# Patient Record
Sex: FEMALE | Race: WHITE | NOT HISPANIC OR LATINO | Employment: OTHER | URBAN - METROPOLITAN AREA
[De-identification: names, ages, dates, MRNs, and addresses within clinical notes are randomized per-mention and may not be internally consistent; named-entity substitution may affect disease eponyms.]

---

## 2019-07-16 ENCOUNTER — HISTORICAL (OUTPATIENT)
Dept: SURGERY | Facility: HOSPITAL | Age: 79
End: 2019-07-16

## 2019-07-16 LAB
BUN SERPL-MCNC: 22 MG/DL (ref 7–18)
CALCIUM SERPL-MCNC: 9.4 MG/DL (ref 8.5–10.1)
CHLORIDE SERPL-SCNC: 105 MMOL/L (ref 98–107)
CO2 SERPL-SCNC: 27 MMOL/L (ref 21–32)
CREAT SERPL-MCNC: 1.45 MG/DL (ref 0.55–1.02)
CREAT/UREA NIT SERPL: 15.2
GLUCOSE SERPL-MCNC: 80 MG/DL (ref 74–106)
INR PPP: 1 (ref 0–1.3)
POTASSIUM SERPL-SCNC: 4.9 MMOL/L (ref 3.5–5.1)
PROTHROMBIN TIME: 13.2 SECOND(S) (ref 12–14)
SODIUM SERPL-SCNC: 137 MMOL/L (ref 136–145)

## 2019-07-23 LAB
ABS NEUT (OLG): 7.3 X10(3)/MCL (ref 2.1–9.2)
BASOPHILS # BLD AUTO: 0.1 X10(3)/MCL (ref 0–0.2)
BASOPHILS NFR BLD AUTO: 1 %
EOSINOPHIL # BLD AUTO: 0.2 X10(3)/MCL (ref 0–0.9)
EOSINOPHIL NFR BLD AUTO: 2 %
ERYTHROCYTE [DISTWIDTH] IN BLOOD BY AUTOMATED COUNT: 13.8 % (ref 11.5–17)
HCT VFR BLD AUTO: 47 % (ref 37–47)
HGB BLD-MCNC: 14.9 GM/DL (ref 12–16)
IMM GRANULOCYTES # BLD AUTO: 0 10*3/UL
IMM GRANULOCYTES NFR BLD AUTO: 2 %
LYMPHOCYTES # BLD AUTO: 1.7 X10(3)/MCL (ref 0.6–4.6)
LYMPHOCYTES NFR BLD AUTO: 17 %
MCH RBC QN AUTO: 29.7 PG (ref 27–31)
MCHC RBC AUTO-ENTMCNC: 31.7 GM/DL (ref 33–36)
MCV RBC AUTO: 93.6 FL (ref 80–94)
MONOCYTES # BLD AUTO: 1 X10(3)/MCL (ref 0.1–1.3)
MONOCYTES NFR BLD AUTO: 10 %
NEUTROPHILS # BLD AUTO: 7.3 X10(3)/MCL (ref 2.1–9.2)
NEUTROPHILS NFR BLD AUTO: 70 %
NRBC BLD AUTO-RTO: 0 % (ref 0–0.2)
PLATELET # BLD AUTO: 301 X10(3)/MCL (ref 130–400)
PMV BLD AUTO: 10.4 FL (ref 9.4–12.4)
RBC # BLD AUTO: 5.02 X10(6)/MCL (ref 4.2–5.4)
WBC # SPEC AUTO: 10.5 X10(3)/MCL (ref 4.5–11.5)

## 2019-07-30 ENCOUNTER — HISTORICAL (OUTPATIENT)
Dept: SURGERY | Facility: HOSPITAL | Age: 79
End: 2019-07-30

## 2019-08-03 ENCOUNTER — HISTORICAL (OUTPATIENT)
Dept: ADMINISTRATIVE | Facility: HOSPITAL | Age: 79
End: 2019-08-03

## 2019-09-09 ENCOUNTER — HISTORICAL (OUTPATIENT)
Dept: CARDIOLOGY | Facility: HOSPITAL | Age: 79
End: 2019-09-09

## 2019-10-17 ENCOUNTER — HISTORICAL (OUTPATIENT)
Dept: RADIOLOGY | Facility: HOSPITAL | Age: 79
End: 2019-10-17

## 2019-11-27 ENCOUNTER — HISTORICAL (OUTPATIENT)
Dept: ADMINISTRATIVE | Facility: HOSPITAL | Age: 79
End: 2019-11-27

## 2020-01-09 ENCOUNTER — HISTORICAL (OUTPATIENT)
Dept: ADMINISTRATIVE | Facility: HOSPITAL | Age: 80
End: 2020-01-09

## 2020-01-09 LAB
AMPHET UR QL SCN: NEGATIVE
BARBITURATE SCN PRESENT UR: NEGATIVE
BENZODIAZ UR QL SCN: NEGATIVE
CANNABINOIDS UR QL SCN: NEGATIVE
COCAINE UR QL SCN: NEGATIVE
OPIATES UR QL SCN: NEGATIVE
PCP UR QL: NEGATIVE
PH UR STRIP.AUTO: 5.5 [PH] (ref 5–7.5)
SP GR FLD REFRACTOMETRY: 1.02 (ref 1–1.03)

## 2020-05-01 LAB
ABS NEUT (OLG): 3.42 X10(3)/MCL (ref 2.1–9.2)
BASOPHILS # BLD AUTO: 0 X10(3)/MCL (ref 0–0.2)
BASOPHILS NFR BLD AUTO: 1 %
BUN SERPL-MCNC: 16 MG/DL (ref 9.8–20.1)
CALCIUM SERPL-MCNC: 9 MG/DL (ref 8.4–10.2)
CHLORIDE SERPL-SCNC: 104 MMOL/L (ref 98–107)
CO2 SERPL-SCNC: 25 MMOL/L (ref 23–31)
CREAT SERPL-MCNC: 1.04 MG/DL (ref 0.55–1.02)
CREAT/UREA NIT SERPL: 15
EOSINOPHIL # BLD AUTO: 0.2 X10(3)/MCL (ref 0–0.9)
EOSINOPHIL NFR BLD AUTO: 4 %
ERYTHROCYTE [DISTWIDTH] IN BLOOD BY AUTOMATED COUNT: 13.7 % (ref 11.5–17)
GLUCOSE SERPL-MCNC: 98 MG/DL (ref 82–115)
HCT VFR BLD AUTO: 43.1 % (ref 37–47)
HGB BLD-MCNC: 13.6 GM/DL (ref 12–16)
INR PPP: 1 (ref 0–1.3)
LYMPHOCYTES # BLD AUTO: 1.3 X10(3)/MCL (ref 0.6–4.6)
LYMPHOCYTES NFR BLD AUTO: 23 %
MCH RBC QN AUTO: 29 PG (ref 27–31)
MCHC RBC AUTO-ENTMCNC: 31.6 GM/DL (ref 33–36)
MCV RBC AUTO: 91.9 FL (ref 80–94)
MONOCYTES # BLD AUTO: 0.6 X10(3)/MCL (ref 0.1–1.3)
MONOCYTES NFR BLD AUTO: 11 %
NEUTROPHILS # BLD AUTO: 3.42 X10(3)/MCL (ref 2.1–9.2)
NEUTROPHILS NFR BLD AUTO: 61 %
PLATELET # BLD AUTO: 208 X10(3)/MCL (ref 130–400)
PMV BLD AUTO: 10 FL (ref 9.4–12.4)
POTASSIUM SERPL-SCNC: 4.7 MMOL/L (ref 3.5–5.1)
PROTHROMBIN TIME: 12.4 SECOND(S) (ref 11.1–13.7)
RBC # BLD AUTO: 4.69 X10(6)/MCL (ref 4.2–5.4)
SODIUM SERPL-SCNC: 141 MMOL/L (ref 136–145)
WBC # SPEC AUTO: 5.6 X10(3)/MCL (ref 4.5–11.5)

## 2020-05-06 ENCOUNTER — HISTORICAL (OUTPATIENT)
Dept: SURGERY | Facility: HOSPITAL | Age: 80
End: 2020-05-06

## 2020-05-27 ENCOUNTER — HISTORICAL (OUTPATIENT)
Dept: SURGERY | Facility: HOSPITAL | Age: 80
End: 2020-05-27

## 2020-05-27 LAB — SARS-COV-2 RNA RESP QL NAA+PROBE: NOT DETECTED

## 2020-12-02 ENCOUNTER — HISTORICAL (OUTPATIENT)
Dept: ADMINISTRATIVE | Facility: HOSPITAL | Age: 80
End: 2020-12-02

## 2021-01-12 ENCOUNTER — HISTORICAL (OUTPATIENT)
Dept: ADMINISTRATIVE | Facility: HOSPITAL | Age: 81
End: 2021-01-12

## 2021-01-12 LAB
ABS NEUT (OLG): 3.35 X10(3)/MCL (ref 2.1–9.2)
ALBUMIN SERPL-MCNC: 3.7 GM/DL (ref 3.4–4.8)
ALBUMIN/GLOB SERPL: 1.2 RATIO (ref 1.1–2)
ALP SERPL-CCNC: 80 UNIT/L (ref 40–150)
ALT SERPL-CCNC: 10 UNIT/L (ref 0–55)
AST SERPL-CCNC: 15 UNIT/L (ref 5–34)
BASOPHILS # BLD AUTO: 0.1 X10(3)/MCL (ref 0–0.2)
BASOPHILS NFR BLD AUTO: 1 %
BILIRUB SERPL-MCNC: 0.6 MG/DL
BILIRUBIN DIRECT+TOT PNL SERPL-MCNC: 0.2 MG/DL (ref 0–0.5)
BILIRUBIN DIRECT+TOT PNL SERPL-MCNC: 0.4 MG/DL (ref 0–0.8)
BUN SERPL-MCNC: 19.9 MG/DL (ref 9.8–20.1)
CALCIUM SERPL-MCNC: 8.8 MG/DL (ref 8.4–10.2)
CHLORIDE SERPL-SCNC: 106 MMOL/L (ref 98–107)
CHOLEST SERPL-MCNC: 231 MG/DL
CHOLEST/HDLC SERPL: 4 {RATIO} (ref 0–5)
CO2 SERPL-SCNC: 25 MMOL/L (ref 23–31)
CREAT SERPL-MCNC: 1.04 MG/DL (ref 0.55–1.02)
CRP SERPL HS-MCNC: 0.46 MG/DL
EOSINOPHIL # BLD AUTO: 0.3 X10(3)/MCL (ref 0–0.9)
EOSINOPHIL NFR BLD AUTO: 5 %
ERYTHROCYTE [DISTWIDTH] IN BLOOD BY AUTOMATED COUNT: 13.5 % (ref 11.5–17)
ERYTHROCYTE [SEDIMENTATION RATE] IN BLOOD: 17 MM/HR (ref 0–20)
GLOBULIN SER-MCNC: 3.1 GM/DL (ref 2.4–3.5)
GLUCOSE SERPL-MCNC: 138 MG/DL (ref 82–115)
HCT VFR BLD AUTO: 43.5 % (ref 37–47)
HDLC SERPL-MCNC: 60 MG/DL (ref 35–60)
HGB BLD-MCNC: 13.6 GM/DL (ref 12–16)
LDLC SERPL CALC-MCNC: 151 MG/DL (ref 50–140)
LYMPHOCYTES # BLD AUTO: 1.3 X10(3)/MCL (ref 0.6–4.6)
LYMPHOCYTES NFR BLD AUTO: 23 %
MCH RBC QN AUTO: 29.5 PG (ref 27–31)
MCHC RBC AUTO-ENTMCNC: 31.3 GM/DL (ref 33–36)
MCV RBC AUTO: 94.4 FL (ref 80–94)
MONOCYTES # BLD AUTO: 0.7 X10(3)/MCL (ref 0.1–1.3)
MONOCYTES NFR BLD AUTO: 13 %
NEUTROPHILS # BLD AUTO: 3.35 X10(3)/MCL (ref 2.1–9.2)
NEUTROPHILS NFR BLD AUTO: 58 %
PLATELET # BLD AUTO: 246 X10(3)/MCL (ref 130–400)
PMV BLD AUTO: 10.9 FL (ref 9.4–12.4)
POTASSIUM SERPL-SCNC: 4.3 MMOL/L (ref 3.5–5.1)
PROT SERPL-MCNC: 6.8 GM/DL (ref 5.8–7.6)
RBC # BLD AUTO: 4.61 X10(6)/MCL (ref 4.2–5.4)
RHEUMATOID FACT SERPL-ACNC: <13 IU/ML
SODIUM SERPL-SCNC: 143 MMOL/L (ref 136–145)
TRIGL SERPL-MCNC: 98 MG/DL (ref 37–140)
TSH SERPL-ACNC: 2.27 UIU/ML (ref 0.35–4.94)
URATE SERPL-MCNC: 8.9 MG/DL (ref 2.6–6)
VLDLC SERPL CALC-MCNC: 20 MG/DL
WBC # SPEC AUTO: 5.8 X10(3)/MCL (ref 4.5–11.5)

## 2021-03-29 ENCOUNTER — HISTORICAL (OUTPATIENT)
Dept: ADMINISTRATIVE | Facility: HOSPITAL | Age: 81
End: 2021-03-29

## 2021-03-29 LAB
EST. AVERAGE GLUCOSE BLD GHB EST-MCNC: 142.7 MG/DL
HBA1C MFR BLD: 6.6 %

## 2021-04-08 ENCOUNTER — HISTORICAL (OUTPATIENT)
Dept: RADIOLOGY | Facility: HOSPITAL | Age: 81
End: 2021-04-08

## 2021-08-03 ENCOUNTER — HISTORICAL (OUTPATIENT)
Dept: ADMINISTRATIVE | Facility: HOSPITAL | Age: 81
End: 2021-08-03

## 2021-08-03 LAB
ABS NEUT (OLG): 4.34 X10(3)/MCL (ref 2.1–9.2)
ALBUMIN SERPL-MCNC: 3.7 GM/DL (ref 3.4–4.8)
ALBUMIN/GLOB SERPL: 1.1 RATIO (ref 1.1–2)
ALP SERPL-CCNC: 71 UNIT/L (ref 40–150)
ALT SERPL-CCNC: 8 UNIT/L (ref 0–55)
AST SERPL-CCNC: 14 UNIT/L (ref 5–34)
BASOPHILS # BLD AUTO: 0.1 X10(3)/MCL (ref 0–0.2)
BASOPHILS NFR BLD AUTO: 1 %
BILIRUB SERPL-MCNC: 0.5 MG/DL
BILIRUBIN DIRECT+TOT PNL SERPL-MCNC: 0.2 MG/DL (ref 0–0.5)
BILIRUBIN DIRECT+TOT PNL SERPL-MCNC: 0.3 MG/DL (ref 0–0.8)
BUN SERPL-MCNC: 31.6 MG/DL (ref 9.8–20.1)
CALCIUM SERPL-MCNC: 9.9 MG/DL (ref 8.4–10.2)
CHLORIDE SERPL-SCNC: 107 MMOL/L (ref 98–107)
CHOLEST SERPL-MCNC: 176 MG/DL
CHOLEST/HDLC SERPL: 3 {RATIO} (ref 0–5)
CO2 SERPL-SCNC: 26 MMOL/L (ref 23–31)
CREAT SERPL-MCNC: 1.16 MG/DL (ref 0.55–1.02)
EOSINOPHIL # BLD AUTO: 0.4 X10(3)/MCL (ref 0–0.9)
EOSINOPHIL NFR BLD AUTO: 5 %
ERYTHROCYTE [DISTWIDTH] IN BLOOD BY AUTOMATED COUNT: 13.3 % (ref 11.5–17)
EST. AVERAGE GLUCOSE BLD GHB EST-MCNC: 142.7 MG/DL
GLOBULIN SER-MCNC: 3.4 GM/DL (ref 2.4–3.5)
GLUCOSE SERPL-MCNC: 144 MG/DL (ref 82–115)
HBA1C MFR BLD: 6.6 %
HCT VFR BLD AUTO: 45 % (ref 37–47)
HDLC SERPL-MCNC: 63 MG/DL (ref 35–60)
HGB BLD-MCNC: 13.9 GM/DL (ref 12–16)
LDLC SERPL CALC-MCNC: 93 MG/DL (ref 50–140)
LYMPHOCYTES # BLD AUTO: 1.7 X10(3)/MCL (ref 0.6–4.6)
LYMPHOCYTES NFR BLD AUTO: 23 %
MCH RBC QN AUTO: 29.4 PG (ref 27–31)
MCHC RBC AUTO-ENTMCNC: 30.9 GM/DL (ref 33–36)
MCV RBC AUTO: 95.1 FL (ref 80–94)
MONOCYTES # BLD AUTO: 0.9 X10(3)/MCL (ref 0.1–1.3)
MONOCYTES NFR BLD AUTO: 12 %
NEUTROPHILS # BLD AUTO: 4.34 X10(3)/MCL (ref 2.1–9.2)
NEUTROPHILS NFR BLD AUTO: 59 %
PLATELET # BLD AUTO: 237 X10(3)/MCL (ref 130–400)
PMV BLD AUTO: 10.5 FL (ref 9.4–12.4)
POTASSIUM SERPL-SCNC: 5.1 MMOL/L (ref 3.5–5.1)
PROT SERPL-MCNC: 7.1 GM/DL (ref 5.8–7.6)
RBC # BLD AUTO: 4.73 X10(6)/MCL (ref 4.2–5.4)
SODIUM SERPL-SCNC: 143 MMOL/L (ref 136–145)
TRIGL SERPL-MCNC: 100 MG/DL (ref 37–140)
TSH SERPL-ACNC: 3.95 UIU/ML (ref 0.35–4.94)
URATE SERPL-MCNC: 5.7 MG/DL (ref 2.6–6)
VLDLC SERPL CALC-MCNC: 20 MG/DL
WBC # SPEC AUTO: 7.4 X10(3)/MCL (ref 4.5–11.5)

## 2021-08-06 ENCOUNTER — HISTORICAL (OUTPATIENT)
Dept: RADIOLOGY | Facility: HOSPITAL | Age: 81
End: 2021-08-06

## 2022-01-27 ENCOUNTER — HISTORICAL (OUTPATIENT)
Dept: ADMINISTRATIVE | Facility: HOSPITAL | Age: 82
End: 2022-01-27

## 2022-01-27 LAB
BUN SERPL-MCNC: 19.3 MG/DL (ref 9.8–20.1)
CALCIUM SERPL-MCNC: 9 MG/DL (ref 8.7–10.5)
CHLORIDE SERPL-SCNC: 108 MMOL/L (ref 98–107)
CO2 SERPL-SCNC: 23 MMOL/L (ref 23–31)
CREAT SERPL-MCNC: 1.09 MG/DL (ref 0.55–1.02)
CREAT/UREA NIT SERPL: 18
EST. AVERAGE GLUCOSE BLD GHB EST-MCNC: 151.3 MG/DL
GLUCOSE SERPL-MCNC: 123 MG/DL (ref 82–115)
HBA1C MFR BLD: 6.9 %
HEMOLYSIS INTERF INDEX SERPL-ACNC: 1
ICTERIC INTERF INDEX SERPL-ACNC: 1
LIPEMIC INTERF INDEX SERPL-ACNC: 6
POTASSIUM SERPL-SCNC: 4.6 MMOL/L (ref 3.5–5.1)
PROT UR STRIP-MCNC: 88.8 MG/DL
SODIUM SERPL-SCNC: 141 MMOL/L (ref 136–145)

## 2022-04-30 NOTE — OP NOTE
Patient:   Ana Appiah            MRN: [id number]            FIN: [id number]               Age:   79 years     Sex:  Female     :  1940   Associated Diagnoses:   None   Author:   Sunny Teixeira MD      DATE OF SURGERY:    2020    SURGEON:  Sunny Teixeira MD    PREOPERATIVE DIAGNOSIS:  Chronic pain syndrome.    POSTOPERATIVE DIAGNOSE:  Chronic pain syndrome.    PROCEDURE PERFORMED:  Fluoroscopically-guided placement of 2 spinal cord stimulator leads under anesthesia for programming and trial.    EQUIPMENT USED:  [employer] stimulator kit.    DETAILED DESCRIPTION:  Following informed consent, and with the patient under general endotracheal anesthesia, he was prepped and draped in the usual sterile fashion.  I infiltrated the tissue overlying L2-3 with local anesthetic.  Under fluoroscopic guidance, I entered the epidural space at L1-2 using two 14-gauge Touhy curved-tip spinal needles.  I introduced stimulator lead and advanced it to the top of T8 and then a 2nd stimulator lead to the top of T9.  I carefully removed the stylets and needles while keeping the lead in place.  The leads were then connected to the generator for programming.  Everything was secured with sterile dressing.  The patient tolerated the procedure well without apparent complication.      ______________________________  Sunny Teixeira MD

## 2022-04-30 NOTE — OP NOTE
Patient:   Ana Appiah            MRN: [id number]            FIN: [id number]               Age:   79 years     Sex:  Female     :  1940   Associated Diagnoses:   None   Author:   Sunny Teixeira MD      DATE OF SURGERY:  20      SURGEON:  Sunny Teixeira MD    PREOPERATIVE DIAGNOSIS:  Chronic pain syndrome.    POSTOPERATIVE DIAGNOSIS:  Chronic pain syndrome.    PROCEDURES PERFORMED:    1. Percutaneous implantation of neurostimulator electrode arrays.  2. Insertion of spinal neurostimulator pulse generator.  3. Electronic analysis of implanted neurostimulator pulse generator system with intraoperative and subsequent programming.    COMPLICATIONS:  None.    INDICATIONS FOR PROCEDURE:  This is a  79 year-old female with a history of chronic pain syndrome, who successfully underwent spinal cord stimulator trial and elected to proceed with implantation.    EQUIPMENT USED:  [employer] stimulator kit.    DETAILED DESCRIPTION:  Following informed consent and with the patient under general endotracheal anesthesia, he was prepped and draped in the usual sterile fashion.  The tissue overlying L2-3 was infiltrated with local anesthetic.  Under fluoroscopic guidance, I advanced a 14-gauge Tuohy curved-tip spinal needle entering the epidural space at T12-L1.  I introduced the stimulator lead and advanced it to the top of T8.  An incision was made over the needle.  Hemostasis was achieved.  The stylet and needle were carefully removed while keeping the lead in place.  An anchoring attachment was placed over the lead, and this was secured with 0 silk suture.  A second 14-gauge Touhy curved-tip spinal needle was then introduced and used to enter into the epidural space at T12-L1.  A second stimulator lead was introduced and advanced to the top of T9.  The stylet and needle were carefully removed while keeping the lead in place.  An anchoring attachment was placed over this lead, and this was secured with 0  silk suture.  A left paramedian incision was then made and dissection was carried down approximately 1 cm deep, using a finger sweep technique to create a pocket.  The neurostimulator pulse generator was temporarily placed in the pocket to confirm positioning.  This was removed and cleaned.  The leads were tunneled subcutaneously to the pocket.  These were connected to the neurostimulator pulse generator, and analysis of the generator confirmed proper functioning.  Final fluoroscopic AP and lateral views were obtained of the leads to confirm placement.  The anchoring attachments were then tightened with the supplied screwdriver and leads connected to the generator were tightened using the supplied screwdriver.  The neurostimulator pulse generator was placed in the pocket. Both wounds were copiously irrigated with Bacitracin and Betadine.  Standard layer closure was performed at both sites with one layer of 0 Vicryl followed by one layer of Stratafix. Exofin was applied to the wounds.  The patient tolerated the procedure well.  There were no apparent complications.      ______________________________  Sunny Teixeira MD

## 2022-04-30 NOTE — OP NOTE
Patient:   Ana Appiah            MRN: [id number]            FIN: [id number]               Age:   79 years     Sex:  Female     :  1940   Associated Diagnoses:   None   Author:   Sunny Teixeira MD      DATE OF SURGERY:    2019    SURGEON:  Sunny Teixeira MD    PREOPERATIVE DIAGNOSIS:  Lumbar spondylosis.    POSTOPERATIVE DIAGNOSIS:  Lumbar spondylosis.    PROCEDURE PERFORMED:  Fluoroscopically-guided medial branch blocks at left L3, L4, L5, and S1.    EQUIPMENT USED:  Epidural tray.    DESCRIPTION OF PROCEDURE:  Following informed consent, the patient was prepped and draped in the usual sterile fashion.  I infiltrated the tissue overlying L3, L4, L5, and S1 with local anesthetic.  Under fluoroscopic guidance, I advanced 4 22-gauge 3.5 inch BD spinal needles, performing medial branch blocks using, divided between the 4 needles, a combination of 160 mg Depo-Medrol, 60mg lidocaine, and 1 mL of 5% dextrose in water.  I removed the needles and applied sterile dressings.  The patient tolerated the procedure well without apparent complication.    IMPRESSION:  Successful fluoroscopically-guided medial branch blocks at left L3, L4, L5, and S1.

## 2022-05-23 ENCOUNTER — LAB VISIT (OUTPATIENT)
Dept: LAB | Facility: HOSPITAL | Age: 82
End: 2022-05-23
Payer: MEDICARE

## 2022-05-23 DIAGNOSIS — I15.2 HYPERTENSION COMPLICATING DIABETES: ICD-10-CM

## 2022-05-23 DIAGNOSIS — E78.5 HYPERLIPIDEMIA ASSOCIATED WITH TYPE 2 DIABETES MELLITUS: Primary | ICD-10-CM

## 2022-05-23 DIAGNOSIS — E11.59 HYPERTENSION COMPLICATING DIABETES: ICD-10-CM

## 2022-05-23 DIAGNOSIS — E11.69 HYPERLIPIDEMIA ASSOCIATED WITH TYPE 2 DIABETES MELLITUS: Primary | ICD-10-CM

## 2022-05-23 LAB
ALBUMIN SERPL-MCNC: 4 GM/DL (ref 3.4–4.8)
ALBUMIN/GLOB SERPL: 1.2 RATIO (ref 1.1–2)
ALP SERPL-CCNC: 72 UNIT/L (ref 40–150)
ALT SERPL-CCNC: 12 UNIT/L (ref 0–55)
AST SERPL-CCNC: 16 UNIT/L (ref 5–34)
BASOPHILS # BLD AUTO: 0.07 X10(3)/MCL (ref 0–0.2)
BASOPHILS NFR BLD AUTO: 0.9 %
BILIRUBIN DIRECT+TOT PNL SERPL-MCNC: 1 MG/DL
BUN SERPL-MCNC: 19.5 MG/DL (ref 9.8–20.1)
CALCIUM SERPL-MCNC: 9.4 MG/DL (ref 8.4–10.2)
CHLORIDE SERPL-SCNC: 107 MMOL/L (ref 98–107)
CHOLEST SERPL-MCNC: 259 MG/DL
CHOLEST/HDLC SERPL: 4 {RATIO} (ref 0–5)
CO2 SERPL-SCNC: 24 MMOL/L (ref 23–31)
CREAT SERPL-MCNC: 1.14 MG/DL (ref 0.55–1.02)
EOSINOPHIL # BLD AUTO: 0.32 X10(3)/MCL (ref 0–0.9)
EOSINOPHIL NFR BLD AUTO: 4.1 %
ERYTHROCYTE [DISTWIDTH] IN BLOOD BY AUTOMATED COUNT: 13.4 % (ref 11.5–17)
EST. AVERAGE GLUCOSE BLD GHB EST-MCNC: 142.7 MG/DL
GLOBULIN SER-MCNC: 3.3 GM/DL (ref 2.4–3.5)
GLUCOSE SERPL-MCNC: 138 MG/DL (ref 82–115)
HBA1C MFR BLD: 6.6 %
HCT VFR BLD AUTO: 44.6 % (ref 37–47)
HDLC SERPL-MCNC: 73 MG/DL (ref 35–60)
HGB BLD-MCNC: 14.4 GM/DL (ref 12–16)
IMM GRANULOCYTES # BLD AUTO: 0.12 X10(3)/MCL (ref 0–0.02)
IMM GRANULOCYTES NFR BLD AUTO: 1.5 % (ref 0–0.43)
LDLC SERPL CALC-MCNC: 150 MG/DL (ref 50–140)
LYMPHOCYTES # BLD AUTO: 1.93 X10(3)/MCL (ref 0.6–4.6)
LYMPHOCYTES NFR BLD AUTO: 24.6 %
MCH RBC QN AUTO: 28.9 PG (ref 27–31)
MCHC RBC AUTO-ENTMCNC: 32.3 MG/DL (ref 33–36)
MCV RBC AUTO: 89.4 FL (ref 80–94)
MONOCYTES # BLD AUTO: 0.81 X10(3)/MCL (ref 0.1–1.3)
MONOCYTES NFR BLD AUTO: 10.3 %
NEUTROPHILS # BLD AUTO: 4.6 X10(3)/MCL (ref 2.1–9.2)
NEUTROPHILS NFR BLD AUTO: 58.6 %
NRBC BLD AUTO-RTO: 0 %
PLATELET # BLD AUTO: 271 X10(3)/MCL (ref 130–400)
PMV BLD AUTO: 10.1 FL (ref 9.4–12.4)
POTASSIUM SERPL-SCNC: 4.2 MMOL/L (ref 3.5–5.1)
PROT SERPL-MCNC: 7.3 GM/DL (ref 5.8–7.6)
RBC # BLD AUTO: 4.99 X10(6)/MCL (ref 4.2–5.4)
SODIUM SERPL-SCNC: 144 MMOL/L (ref 136–145)
TRIGL SERPL-MCNC: 179 MG/DL (ref 37–140)
TSH SERPL-ACNC: 5.6 UIU/ML (ref 0.35–4.94)
VLDLC SERPL CALC-MCNC: 36 MG/DL
WBC # SPEC AUTO: 7.9 X10(3)/MCL (ref 4.5–11.5)

## 2022-05-23 PROCEDURE — 83036 HEMOGLOBIN GLYCOSYLATED A1C: CPT

## 2022-05-23 PROCEDURE — 84478 ASSAY OF TRIGLYCERIDES: CPT

## 2022-05-23 PROCEDURE — 84443 ASSAY THYROID STIM HORMONE: CPT

## 2022-05-23 PROCEDURE — 84439 ASSAY OF FREE THYROXINE: CPT

## 2022-05-23 PROCEDURE — 83718 ASSAY OF LIPOPROTEIN: CPT

## 2022-05-23 PROCEDURE — 85025 COMPLETE CBC W/AUTO DIFF WBC: CPT

## 2022-05-23 PROCEDURE — 36415 COLL VENOUS BLD VENIPUNCTURE: CPT

## 2022-05-23 PROCEDURE — 80053 COMPREHEN METABOLIC PANEL: CPT

## 2022-05-23 PROCEDURE — 84481 FREE ASSAY (FT-3): CPT

## 2022-05-24 LAB
T3FREE SERPL-MCNC: 3.21 PG/ML (ref 1.57–3.91)
T4 FREE SERPL-MCNC: 1.13 NG/DL (ref 0.7–1.48)

## 2022-11-01 ENCOUNTER — HOSPITAL ENCOUNTER (OUTPATIENT)
Dept: RADIOLOGY | Facility: HOSPITAL | Age: 82
Discharge: HOME | End: 2022-11-01
Attending: NURSE PRACTITIONER
Payer: MEDICARE

## 2022-11-01 ENCOUNTER — OFFICE VISIT (OUTPATIENT)
Dept: NEUROLOGY | Facility: CLINIC | Age: 82
End: 2022-11-01
Payer: MEDICARE

## 2022-11-01 VITALS
BODY MASS INDEX: 29.81 KG/M2 | HEIGHT: 62 IN | WEIGHT: 162 LBS | SYSTOLIC BLOOD PRESSURE: 139 MMHG | DIASTOLIC BLOOD PRESSURE: 91 MMHG

## 2022-11-01 DIAGNOSIS — G89.4 CHRONIC PAIN SYNDROME: ICD-10-CM

## 2022-11-01 DIAGNOSIS — Z96.89 SPINAL CORD STIMULATOR STATUS: ICD-10-CM

## 2022-11-01 DIAGNOSIS — Z96.89 SPINAL CORD STIMULATOR STATUS: Primary | ICD-10-CM

## 2022-11-01 PROBLEM — M54.9 BACK PAIN: Status: ACTIVE | Noted: 2022-11-01

## 2022-11-01 PROCEDURE — 72100 X-RAY EXAM L-S SPINE 2/3 VWS: CPT | Mod: TC

## 2022-11-01 PROCEDURE — 1159F MED LIST DOCD IN RCRD: CPT | Mod: CPTII,S$GLB,, | Performed by: NURSE PRACTITIONER

## 2022-11-01 PROCEDURE — 3080F PR MOST RECENT DIASTOLIC BLOOD PRESSURE >= 90 MM HG: ICD-10-PCS | Mod: CPTII,S$GLB,, | Performed by: NURSE PRACTITIONER

## 2022-11-01 PROCEDURE — 3288F PR FALLS RISK ASSESSMENT DOCUMENTED: ICD-10-PCS | Mod: CPTII,S$GLB,, | Performed by: NURSE PRACTITIONER

## 2022-11-01 PROCEDURE — 3075F PR MOST RECENT SYSTOLIC BLOOD PRESS GE 130-139MM HG: ICD-10-PCS | Mod: CPTII,S$GLB,, | Performed by: NURSE PRACTITIONER

## 2022-11-01 PROCEDURE — 3288F FALL RISK ASSESSMENT DOCD: CPT | Mod: CPTII,S$GLB,, | Performed by: NURSE PRACTITIONER

## 2022-11-01 PROCEDURE — 3075F SYST BP GE 130 - 139MM HG: CPT | Mod: CPTII,S$GLB,, | Performed by: NURSE PRACTITIONER

## 2022-11-01 PROCEDURE — 1159F PR MEDICATION LIST DOCUMENTED IN MEDICAL RECORD: ICD-10-PCS | Mod: CPTII,S$GLB,, | Performed by: NURSE PRACTITIONER

## 2022-11-01 PROCEDURE — 1101F PT FALLS ASSESS-DOCD LE1/YR: CPT | Mod: CPTII,S$GLB,, | Performed by: NURSE PRACTITIONER

## 2022-11-01 PROCEDURE — 3080F DIAST BP >= 90 MM HG: CPT | Mod: CPTII,S$GLB,, | Performed by: NURSE PRACTITIONER

## 2022-11-01 PROCEDURE — 99999 PR PBB SHADOW E&M-EST. PATIENT-LVL III: CPT | Mod: PBBFAC,,, | Performed by: NURSE PRACTITIONER

## 2022-11-01 PROCEDURE — 99999 PR PBB SHADOW E&M-EST. PATIENT-LVL III: ICD-10-PCS | Mod: PBBFAC,,, | Performed by: NURSE PRACTITIONER

## 2022-11-01 PROCEDURE — 99213 OFFICE O/P EST LOW 20 MIN: CPT | Mod: S$GLB,,, | Performed by: NURSE PRACTITIONER

## 2022-11-01 PROCEDURE — 72070 X-RAY EXAM THORAC SPINE 2VWS: CPT | Mod: TC

## 2022-11-01 PROCEDURE — 99213 PR OFFICE/OUTPT VISIT, EST, LEVL III, 20-29 MIN: ICD-10-PCS | Mod: S$GLB,,, | Performed by: NURSE PRACTITIONER

## 2022-11-01 PROCEDURE — 1101F PR PT FALLS ASSESS DOC 0-1 FALLS W/OUT INJ PAST YR: ICD-10-PCS | Mod: CPTII,S$GLB,, | Performed by: NURSE PRACTITIONER

## 2022-11-01 RX ORDER — ALLOPURINOL 100 MG/1
1 TABLET ORAL DAILY
COMMUNITY
Start: 2021-10-19

## 2022-11-01 RX ORDER — DULOXETIN HYDROCHLORIDE 30 MG/1
60 CAPSULE, DELAYED RELEASE ORAL 2 TIMES DAILY
COMMUNITY
Start: 2022-10-12 | End: 2024-03-08

## 2022-11-01 RX ORDER — ONDANSETRON 8 MG/1
8 TABLET, ORALLY DISINTEGRATING ORAL EVERY 8 HOURS PRN
COMMUNITY
Start: 2022-07-16 | End: 2023-03-28 | Stop reason: SDUPTHER

## 2022-11-01 NOTE — PROGRESS NOTES
Subjective:       Patient ID: Ana Appiah is a 82 y.o. female.    Chief Complaint:  Pain (Pt denies any pain. States SCS is charged. Also states it does not relieve pain as it use to  )      History of Present Illness  Patient presents for annual follow up of spinal cord stimulator status and chronic back pain. Patient denies any back pain today. Does feel like her stimulator is not as effective as it once was. Does report pain to battery site. Had a fall in July, tripped over a hose. Has been having difficulty taking Cymbalta post covid; it has caused nausea at night. Advised to take in AMSCS placed in May of 2020.     Past Medical History:   Diagnosis Date    Diabetes mellitus     Hypertension        History reviewed. No pertinent surgical history.    History reviewed. No pertinent family history.    Social History     Socioeconomic History    Marital status:    Tobacco Use    Smoking status: Never     Passive exposure: Never    Smokeless tobacco: Never   Substance and Sexual Activity    Alcohol use: Not Currently    Drug use: Never       Current Outpatient Medications   Medication Sig Dispense Refill    allopurinoL (ZYLOPRIM) 100 MG tablet Take 1 tablet by mouth once daily.      DULoxetine (CYMBALTA) 30 MG capsule Take 30 mg by mouth 2 (two) times daily.      ondansetron (ZOFRAN-ODT) 8 MG TbDL Take 8 mg by mouth every 8 (eight) hours as needed.       No current facility-administered medications for this visit.       Review of patient's allergies indicates:   Allergen Reactions    Penicillins      Other reaction(s): SOB, SWELLING AT INJ. SITE        Review of Systems  Review of Systems   Musculoskeletal:  Negative for back pain.   All other systems reviewed and are negative.    Objective:      Neurologic Exam     Mental Status   Oriented to person, place, and time.   Attention: normal. Concentration: normal.   Speech: speech is normal   Level of consciousness: alert  Knowledge: good.      Cranial Nerves   Cranial nerves II through XII intact.     Motor Exam   Muscle bulk: normal  Right arm tone: normal  Left arm tone: normal  Right leg tone: normal  Left leg tone: normal    Strength   Strength 5/5 throughout.     Sensory Exam   Light touch normal.     Gait, Coordination, and Reflexes     Gait  Gait: normal    Physical Exam  Vitals and nursing note reviewed.   Pulmonary:      Effort: Pulmonary effort is normal.   Musculoskeletal:      Lumbar back: Tenderness present. Negative right straight leg raise test and negative left straight leg raise test.   Neurological:      Mental Status: She is oriented to person, place, and time.      Cranial Nerves: Cranial nerves 2-12 are intact.      Motor: Motor strength is normal.      Gait: Gait is intact.   Psychiatric:         Speech: Speech normal.         Assessment:        1. Spinal cord stimulator status    2. Chronic pain syndrome        Plan:   Advised to call [facility] to work with programming  Take Cymbalta in AM  Will have Xrays completed to determine lead placement  May need to consider battery revision

## 2022-11-04 ENCOUNTER — TELEPHONE (OUTPATIENT)
Dept: NEUROLOGY | Facility: CLINIC | Age: 82
End: 2022-11-04
Payer: MEDICARE

## 2022-11-04 NOTE — TELEPHONE ENCOUNTER
Advised pt daughter of NP message.   Daughter stated that battery for SCS isn't holding a charge. Possible battery revision

## 2022-11-04 NOTE — TELEPHONE ENCOUNTER
Please notify patient that Xrays showed both leads at T8; I notified Sherry with [facility] to call patient to see if there could be a different program that would be more beneficial for patient's pain. Notify patient Sherry should be giving her a call.

## 2023-01-10 ENCOUNTER — LAB VISIT (OUTPATIENT)
Dept: LAB | Facility: HOSPITAL | Age: 83
End: 2023-01-10
Attending: NURSE PRACTITIONER
Payer: MEDICARE

## 2023-01-10 DIAGNOSIS — E11.00 TYPE II DIABETES MELLITUS WITH HYPEROSMOLARITY, UNCONTROLLED: ICD-10-CM

## 2023-01-10 DIAGNOSIS — E05.00 TOXIC DIFFUSE GOITER WITH PRETIBIAL MYXEDEMA: Primary | ICD-10-CM

## 2023-01-10 DIAGNOSIS — E03.8 TOXIC DIFFUSE GOITER WITH PRETIBIAL MYXEDEMA: Primary | ICD-10-CM

## 2023-01-10 DIAGNOSIS — N18.31 CHRONIC KIDNEY DISEASE (CKD) STAGE G3A/A1, MODERATELY DECREASED GLOMERULAR FILTRATION RATE (GFR) BETWEEN 45-59 ML/MIN/1.73 SQUARE METER AND ALBUMINURIA CREATININE RATIO LESS THAN 30 MG/G: ICD-10-CM

## 2023-01-10 DIAGNOSIS — E78.5 HYPERLIPIDEMIA, UNSPECIFIED HYPERLIPIDEMIA TYPE: ICD-10-CM

## 2023-01-10 LAB
ALBUMIN SERPL-MCNC: 3.7 G/DL (ref 3.4–4.8)
ALBUMIN/GLOB SERPL: 1.2 RATIO (ref 1.1–2)
ALP SERPL-CCNC: 78 UNIT/L (ref 40–150)
ALT SERPL-CCNC: 8 UNIT/L (ref 0–55)
AST SERPL-CCNC: 14 UNIT/L (ref 5–34)
BASOPHILS # BLD AUTO: 0.06 X10(3)/MCL (ref 0–0.2)
BASOPHILS NFR BLD AUTO: 1 %
BILIRUBIN DIRECT+TOT PNL SERPL-MCNC: 0.9 MG/DL
BUN SERPL-MCNC: 16.9 MG/DL (ref 9.8–20.1)
CALCIUM SERPL-MCNC: 9.4 MG/DL (ref 8.4–10.2)
CHLORIDE SERPL-SCNC: 107 MMOL/L (ref 98–107)
CHOLEST SERPL-MCNC: 201 MG/DL
CHOLEST/HDLC SERPL: 4 {RATIO} (ref 0–5)
CO2 SERPL-SCNC: 24 MMOL/L (ref 23–31)
CREAT SERPL-MCNC: 1.23 MG/DL (ref 0.55–1.02)
CREAT UR-MCNC: 221.5 MG/DL (ref 47–110)
EOSINOPHIL # BLD AUTO: 0.31 X10(3)/MCL (ref 0–0.9)
EOSINOPHIL NFR BLD AUTO: 5.3 %
ERYTHROCYTE [DISTWIDTH] IN BLOOD BY AUTOMATED COUNT: 13.5 % (ref 11.5–17)
GFR SERPLBLD CREATININE-BSD FMLA CKD-EPI: 44 MLS/MIN/1.73/M2
GLOBULIN SER-MCNC: 3.1 GM/DL (ref 2.4–3.5)
GLUCOSE SERPL-MCNC: 135 MG/DL (ref 82–115)
HCT VFR BLD AUTO: 45.4 % (ref 37–47)
HDLC SERPL-MCNC: 56 MG/DL (ref 35–60)
HGB BLD-MCNC: 14.5 GM/DL (ref 12–16)
IMM GRANULOCYTES # BLD AUTO: 0.04 X10(3)/MCL (ref 0–0.04)
IMM GRANULOCYTES NFR BLD AUTO: 0.7 %
LDLC SERPL CALC-MCNC: 125 MG/DL (ref 50–140)
LYMPHOCYTES # BLD AUTO: 1.3 X10(3)/MCL (ref 0.6–4.6)
LYMPHOCYTES NFR BLD AUTO: 22.3 %
MCH RBC QN AUTO: 28.5 PG
MCHC RBC AUTO-ENTMCNC: 31.9 MG/DL (ref 33–36)
MCV RBC AUTO: 89.4 FL (ref 80–94)
MICROALBUMIN UR-MCNC: 1142.9 UG/ML
MICROALBUMIN/CREAT RATIO PNL UR: 516 MG/GM CR (ref 0–30)
MONOCYTES # BLD AUTO: 0.7 X10(3)/MCL (ref 0.1–1.3)
MONOCYTES NFR BLD AUTO: 12 %
NEUTROPHILS # BLD AUTO: 3.42 X10(3)/MCL (ref 2.1–9.2)
NEUTROPHILS NFR BLD AUTO: 58.7 %
NRBC BLD AUTO-RTO: 0 %
PLATELET # BLD AUTO: 217 X10(3)/MCL (ref 130–400)
PMV BLD AUTO: 10.4 FL (ref 7.4–10.4)
POTASSIUM SERPL-SCNC: 3.9 MMOL/L (ref 3.5–5.1)
PROT SERPL-MCNC: 6.8 GM/DL (ref 5.8–7.6)
RBC # BLD AUTO: 5.08 X10(6)/MCL (ref 4.2–5.4)
SODIUM SERPL-SCNC: 140 MMOL/L (ref 136–145)
T3FREE SERPL-MCNC: 3.02 PG/ML (ref 1.57–3.91)
T4 FREE SERPL-MCNC: 0.94 NG/DL (ref 0.7–1.48)
TRIGL SERPL-MCNC: 101 MG/DL (ref 37–140)
TSH SERPL-ACNC: 3.58 UIU/ML (ref 0.35–4.94)
VLDLC SERPL CALC-MCNC: 20 MG/DL
WBC # SPEC AUTO: 5.8 X10(3)/MCL (ref 4.5–11.5)

## 2023-01-10 PROCEDURE — 85025 COMPLETE CBC W/AUTO DIFF WBC: CPT

## 2023-01-10 PROCEDURE — 82043 UR ALBUMIN QUANTITATIVE: CPT

## 2023-01-10 PROCEDURE — 84443 ASSAY THYROID STIM HORMONE: CPT

## 2023-01-10 PROCEDURE — 80061 LIPID PANEL: CPT

## 2023-01-10 PROCEDURE — 80053 COMPREHEN METABOLIC PANEL: CPT

## 2023-01-10 PROCEDURE — 36415 COLL VENOUS BLD VENIPUNCTURE: CPT

## 2023-01-10 PROCEDURE — 84439 ASSAY OF FREE THYROXINE: CPT

## 2023-01-10 PROCEDURE — 84481 FREE ASSAY (FT-3): CPT

## 2023-03-28 ENCOUNTER — HOSPITAL ENCOUNTER (EMERGENCY)
Facility: HOSPITAL | Age: 83
Discharge: HOME | End: 2023-03-28
Attending: STUDENT IN AN ORGANIZED HEALTH CARE EDUCATION/TRAINING PROGRAM
Payer: MEDICARE

## 2023-03-28 VITALS
SYSTOLIC BLOOD PRESSURE: 173 MMHG | HEART RATE: 76 BPM | RESPIRATION RATE: 18 BRPM | DIASTOLIC BLOOD PRESSURE: 94 MMHG | TEMPERATURE: 99 F | OXYGEN SATURATION: 98 %

## 2023-03-28 DIAGNOSIS — M25.552 LEFT HIP PAIN: Primary | ICD-10-CM

## 2023-03-28 DIAGNOSIS — M54.16 LUMBAR RADICULOPATHY: ICD-10-CM

## 2023-03-28 PROCEDURE — 99284 EMERGENCY DEPT VISIT MOD MDM: CPT | Mod: 25

## 2023-03-28 PROCEDURE — 25000003 PHARM REV CODE 250: Performed by: PHYSICIAN ASSISTANT

## 2023-03-28 RX ORDER — ONDANSETRON 8 MG/1
8 TABLET, ORALLY DISINTEGRATING ORAL EVERY 8 HOURS PRN
Qty: 20 TABLET | Refills: 0 | Status: SHIPPED | OUTPATIENT
Start: 2023-03-28

## 2023-03-28 RX ORDER — ONDANSETRON 4 MG/1
4 TABLET, ORALLY DISINTEGRATING ORAL
Status: COMPLETED | OUTPATIENT
Start: 2023-03-28 | End: 2023-03-28

## 2023-03-28 RX ORDER — HYDROCODONE BITARTRATE AND ACETAMINOPHEN 7.5; 325 MG/1; MG/1
1 TABLET ORAL EVERY 6 HOURS PRN
Status: DISCONTINUED | OUTPATIENT
Start: 2023-03-28 | End: 2023-03-28 | Stop reason: HOSPADM

## 2023-03-28 RX ORDER — HYDROCODONE BITARTRATE AND ACETAMINOPHEN 5; 325 MG/1; MG/1
1 TABLET ORAL EVERY 8 HOURS PRN
Qty: 15 TABLET | Refills: 0 | Status: SHIPPED | OUTPATIENT
Start: 2023-03-28 | End: 2023-04-02

## 2023-03-28 RX ORDER — DICLOFENAC SODIUM 50 MG/1
50 TABLET, DELAYED RELEASE ORAL 3 TIMES DAILY
Qty: 21 TABLET | Refills: 0 | Status: SHIPPED | OUTPATIENT
Start: 2023-03-28 | End: 2023-04-04

## 2023-03-28 RX ADMIN — ONDANSETRON 4 MG: 4 TABLET, ORALLY DISINTEGRATING ORAL at 02:03

## 2023-03-28 RX ADMIN — HYDROCODONE BITARTRATE AND ACETAMINOPHEN 1 TABLET: 7.5; 325 TABLET ORAL at 01:03

## 2023-03-28 NOTE — ED TRIAGE NOTES
Pt complaint of pain to left hip/pelvic area after stumbling without a fall over a concrete parking marker a week ago

## 2023-03-28 NOTE — DISCHARGE INSTRUCTIONS
Use ice and heat to area, 20 min on and 20 min off.     You have been prescribed Diclofenac for pain. This is an Non-Steroidal Anti-Inflammatory (NSAID) Medication. Please do not take any additional NSAIDs while you are taking this medication including (Advil, Aleve, Motrin, Ibuprofen, Mobic\meloxicam, Naprosyn, Toradol, ketoralac, etc.). Please stop taking this medication if you experience: weakness, itching, yellow skin or eyes, joint pains, vomiting blood, blood or black stools, unusual weight gain, or swelling in your arms, legs, hands, or feet.     You have been prescribed Norco (Hydrocodone) for pain. Please do not take this medication while working, drinking alcohol, swimming, or while driving/operating heavy machinery. This medication may cause drowsiness, dizziness, impair judgment, and reduce physical capabilities.You should not drive, operate heavy machinery, or make life changing decisions while taking this medication.      This medication contains Tylenol. Please do not take any additional Tylenol while you are taking this medication.     While in the Emergency Department you received medication that may cause drowsiness, dizziness, impaired judgment, and reduced physical capabilities. You should not drive, operate heavy machinery, swim, or make life  changing decisions within 24 hours of receiving this medication.

## 2023-03-28 NOTE — ED PROVIDER NOTES
Encounter Date: 3/28/2023       History     Chief Complaint   Patient presents with    Hip Pain     Pt complaint of pain to left hip/pelvic area after stumbling without a fall over a concrete parking marker a week ago     82-year-old female presents to ED for evaluation of left lower back and hip pain for the past week.  Patient reports that she was walking when she tripped over a concrete piece in the parking lot causing her to twist falling hitting vehicle.  Denies falling to the ground.  Denies hitting head or loss of consciousness.  Complains of low back and hip pain.  States pain radiates from her buttock down her left leg to knee.  Patient states that she has a history of chronic back pain with history of osteoporotic fractures.  States she has spinal stimulator in place.  Taking over-the-counter medications without relief.    The history is provided by the patient. No  was used.   Review of patient's allergies indicates:   Allergen Reactions    Penicillins      Other reaction(s): SOB, SWELLING AT INJ. SITE     Past Medical History:   Diagnosis Date    Diabetes mellitus     Hypertension      No past surgical history on file.  No family history on file.  Social History     Tobacco Use    Smoking status: Never     Passive exposure: Never    Smokeless tobacco: Never   Substance Use Topics    Alcohol use: Not Currently    Drug use: Never     Review of Systems   Constitutional:  Negative for chills, fatigue and fever.   Respiratory:  Negative for shortness of breath.    Cardiovascular:  Negative for chest pain.   Gastrointestinal:  Negative for abdominal pain, diarrhea, nausea and vomiting.   Genitourinary:  Negative for dysuria, flank pain, frequency and urgency.   Musculoskeletal:  Positive for back pain and myalgias.   All other systems reviewed and are negative.    Physical Exam     Initial Vitals [03/28/23 1204]   BP Pulse Resp Temp SpO2   (!) 173/94 76 18 98.6 °F (37 °C) 98 %      MAP        --         Physical Exam    Vitals reviewed.  Constitutional: She appears well-developed.   HENT:   Head: Normocephalic and atraumatic.   Right Ear: Tympanic membrane and external ear normal.   Left Ear: Tympanic membrane and external ear normal.   Mouth/Throat: Uvula is midline, oropharynx is clear and moist and mucous membranes are normal. No trismus in the jaw. No uvula swelling. No oropharyngeal exudate, posterior oropharyngeal edema or posterior oropharyngeal erythema.   Eyes: Conjunctivae are normal. Pupils are equal, round, and reactive to light.   Neck: Neck supple.   Normal range of motion.  Cardiovascular:  Normal rate, regular rhythm and normal heart sounds.           Pulmonary/Chest: Breath sounds normal. She has no wheezes. She has no rhonchi. She has no rales.   Abdominal: Abdomen is soft. Bowel sounds are normal. There is no abdominal tenderness.   Musculoskeletal:      Cervical back: Normal, normal range of motion and neck supple.      Thoracic back: Normal.      Lumbar back: Tenderness present. No swelling, spasms or bony tenderness. Normal range of motion. Positive left straight leg raise test.        Back:      Neurological: She is alert and oriented to person, place, and time.   Skin: Skin is warm and dry.   Psychiatric: She has a normal mood and affect.       ED Course   Procedures  Labs Reviewed - No data to display       Imaging Results              CT Pelvis Without Contrast (Final result)  Result time 03/28/23 13:45:50      Final result by Sae Varela MD (03/28/23 13:45:50)                   Narrative:    EXAMINATION  CT PELVIS WITHOUT CONTRAST    CLINICAL HISTORY  Pelvis pain, stress fracture suspected, neg xray;    TECHNIQUE  Non-contrast helical-acquisition CT images were obtained and multiplanar reformats accomplished by a CT technologist at a separate workstation, pushed to PACS for physician review.    Enteric contrast: none utilized    COMPARISON  None available at the time of  initial interpretation.    FINDINGS  Images were reviewed in soft tissue, lung, and bone windows.    Exam quality: Inherently limited evaluation of the abdominopelvic organs and vasculature secondary to non-contrast protocol.  Additional limitation is appreciated in the region of the left hip secondary to streak artifact produced by arthroplasty hardware.    Lines/tubes: Left lower lumbar subcutaneous electronic device is present, with stimulator leads partially visualized through the subjacent soft tissues.    There are notable degenerative changes throughout the visualized lumbar spine and the right hip, as well as symmetric appearance of degenerative alterations at the sacroiliac joints.  No convincing acute cortical displacement or trabecular irregularity is identified.  Visualized joints and pelvic ring structures are congruent.  Left arthroplasty components are well position, with no evidence of osseous loosening.  No significant joint effusion is identified.    The regional subcutaneous tissues and underlying musculature are without acute abnormality or suspicious focal finding.  No acute abnormality is appreciated within the pelvis or included lower abdomen.  Extensive burden of descending and sigmoid diverticulosis is incidentally appreciated, with no findings to suggest associated acute or chronic complication.  Widespread vascular calcification is also present.  There is no intra-abdominal free fluid, pneumoperitoneum, or drainable collection.    IMPRESSION  1. Degenerative alterations of the bony pelvis and left hip arthroplasty, with no convincing CT evidence of acute abnormality.  2. Chronic incidental findings discussed above.    RADIATION DOSE  Automated tube current modulation, weight-based exposure dosing, and/or iterative reconstruction technique utilized to reach lowest reasonably achievable exposure rate.    DLP: 1145 mGy*cm      Electronically signed by: Sae  Avery  Date:    03/28/2023  Time:    13:45                                     CT Lumbar Spine Without Contrast (Final result)  Result time 03/28/23 13:16:34      Final result by Wade Walker MD (03/28/23 13:16:34)                   Impression:      1. No acute fracture or malalignment.    2. L2 and L3 vertebral bodies chronic compression deformities and L5-S1 grade 1 spondylolisthesis.    3.  Lumbar degenerative disc disease and spondylosis level by level discussed above.      Electronically signed by: Wade Walker  Date:    03/28/2023  Time:    13:16               Narrative:    EXAMINATION:  CT LUMBAR SPINE WITHOUT CONTRAST    CLINICAL HISTORY:  Lumbar radiculopathy, symptoms persist with conservative treatment;    TECHNIQUE:  Multidetector axial images were performed of the lumbar spine without contrast and the images were reformatted.    Dose length product of 1145 mGycm. Automated exposure control was utilized to minimize radiation dose.    COMPARISON:  Lumbar spine radiographs November 1, 2022    FINDINGS:  The most inferior well formed intervertebral disc space is presumed to be L5-S1.  There is grade 1 anterolisthesis of L5 on S1 without L5 pars defects.  Otherwise, lumbar vertebrae alignment is preserved.  There is chronic compression deformity of L2 vertebral body with 30% loss of stature.  There is also chronic compression deformity of L3 vertebral body along the superior endplate.  These compression deformities likely are osteoporotic.  No acute fracture or malalignment identified.  Lumbar levoscoliotic curvature.  Left abdomen subcutaneous devise electrodes enter the thecal sac at the level of L1.    Right kidney 2.6 cm exophytic hypodense likely cystic structure is incompletely assessed on this exam without contrast and can be further confirmed with ultrasound exam.  Extensive sigmoid noninflamed diverticulosis coli.    Disc segmental analysis is given below:    At L1-L2, disc height is preserved.   Mild facet arthropathy.  Central canal is not stenosed and there are no narrowings of the neural foramen.    At L2-L3, there is generalized disc bulge, ligamentum flavum thickening and facet arthropathy causing moderate central canal stenosis.  There are no narrowings of the neural foramen.    At L3-L4, there is disc bulge which slightly indents the ventral thecal sac.  Moderate central canal stenosis mostly is caused by ligamentum flavum thickening and facet arthropathy.  There are no narrowings of the neural foramen.    At L4-L5, there is broad disc protrusion, ligamentum flavum thickening and facet arthropathy causing marked central canal stenosis.  Bilateral effacement of the lateral recesses.  Disc also lateralizes into the right neural foramen resulting in marked narrowing.  The left neural foramen is patent.    At L5-S1, there is broad disc protrusion, ligamentum flavum thickening and facet arthropathy causing moderate to marked central canal stenosis.  Right neural foramen is patent.  Disc and facet arthropathy causes narrowing of the left neural foramen.                                       Medications   HYDROcodone-acetaminophen 7.5-325 mg per tablet 1 tablet (1 tablet Oral Given 3/28/23 1316)   ondansetron disintegrating tablet 4 mg (4 mg Oral Given 3/28/23 1410)     Medical Decision Making:   History:   Old Medical Records: I decided to obtain old medical records.  Initial Assessment:   82-year-old female presents to ED for evaluation of left lower back and hip pain for the past week.  Patient reports that she was walking when she tripped over a concrete piece in the parking lot causing her to twist falling hitting vehicle.  Denies falling to the ground.  Denies hitting head or loss of consciousness.  Complains of low back and hip pain.  States pain radiates from her buttock down her left leg to knee.  Patient states that she has a history of chronic back pain with history of osteoporotic fractures.   States she has spinal stimulator in place.  Taking over-the-counter medications without relief.  Differential Diagnosis:   Fall, contusion, lumbar radiculopathy, sciatica, fracture  Clinical Tests:   Radiological Study: Ordered and Reviewed  ED Management:  Patient GCS 15 and neuro intact.  Ambulatory with steady gait.  Patient presents to ED for evaluation of acute on chronic back pain following injury.  Patient states she twisting in parking lot 1 week ago causing inflammation in her back with pain radiating down her left leg.  CT lumbar and pelvis obtained to rule out any acute findings.  Old osteoporotic fractures noted with a bulging disc.  No saddle anesthesia loss of bowel or urinary incontinence.  No indication for emergent MRI at this time.  Patient feeling better after [place]man.  Will discharge home with short course of symptomatic care. Return ED precautions given.  I provided counseling to patient with regard to condition, the treatment plan, specific conditions for return, and the importance of follow up. Detailed written and verbal instructions provided to patient and she expressed a verbal understanding of the discharge instructions and overall management plan. Reiterated the importance of medication administration and safety. Advised patient to follow up with primary care provider in 3-5 days or sooner if needed.  Answered questions at this time. The patient is stable for discharge.                           Clinical Impression:   Final diagnoses:  [M25.552] Left hip pain (Primary)  [M54.16] Lumbar radiculopathy        ED Disposition Condition    Discharge Stable          ED Prescriptions       Medication Sig Dispense Start Date End Date Auth. Provider    diclofenac (VOLTAREN) 50 MG EC tablet Take 1 tablet (50 mg total) by mouth 3 (three) times daily. for 7 days 21 tablet 3/28/2023 4/4/2023 SUSAN Wilkerson    HYDROcodone-acetaminophen (NORCO) 5-325 mg per tablet Take 1 tablet by mouth every 8 (eight)  hours as needed for Pain. 15 tablet 3/28/2023 4/2/2023 SUSAN Wilkerson    ondansetron (ZOFRAN-ODT) 8 MG TbDL Take 1 tablet (8 mg total) by mouth every 8 (eight) hours as needed (nausea). 20 tablet 3/28/2023 -- SUSAN Wilkerson          Follow-up Information       Follow up With Specialties Details Why Contact Info    Cheri Roth NP Internal Medicine   [id number] [facility] [address]  [phone]               SUSAN Wilkerson  03/28/23 3904

## 2023-05-23 DIAGNOSIS — Z12.31 ENCOUNTER FOR SCREENING MAMMOGRAM FOR MALIGNANT NEOPLASM OF BREAST: Primary | ICD-10-CM

## 2023-07-19 ENCOUNTER — HOSPITAL ENCOUNTER (OUTPATIENT)
Dept: RADIOLOGY | Facility: HOSPITAL | Age: 83
Discharge: HOME | End: 2023-07-19
Attending: INTERNAL MEDICINE
Payer: MEDICARE

## 2023-07-19 ENCOUNTER — TELEPHONE (OUTPATIENT)
Dept: RHEUMATOLOGY | Facility: CLINIC | Age: 83
End: 2023-07-19

## 2023-07-19 ENCOUNTER — OFFICE VISIT (OUTPATIENT)
Dept: RHEUMATOLOGY | Facility: CLINIC | Age: 83
End: 2023-07-19
Payer: MEDICARE

## 2023-07-19 VITALS
DIASTOLIC BLOOD PRESSURE: 78 MMHG | RESPIRATION RATE: 20 BRPM | WEIGHT: 159.19 LBS | HEART RATE: 62 BPM | TEMPERATURE: 98 F | BODY MASS INDEX: 29.3 KG/M2 | HEIGHT: 62 IN | SYSTOLIC BLOOD PRESSURE: 158 MMHG

## 2023-07-19 DIAGNOSIS — Z79.899 HIGH RISK MEDICATION USE: ICD-10-CM

## 2023-07-19 DIAGNOSIS — E78.5 HYPERLIPIDEMIA, UNSPECIFIED HYPERLIPIDEMIA TYPE: ICD-10-CM

## 2023-07-19 DIAGNOSIS — M06.09 RHEUMATOID ARTHRITIS OF MULTIPLE SITES WITH NEGATIVE RHEUMATOID FACTOR: ICD-10-CM

## 2023-07-19 DIAGNOSIS — M81.0 AGE-RELATED OSTEOPOROSIS WITHOUT CURRENT PATHOLOGICAL FRACTURE: ICD-10-CM

## 2023-07-19 DIAGNOSIS — I10 PRIMARY HYPERTENSION: ICD-10-CM

## 2023-07-19 DIAGNOSIS — M1A.09X0 IDIOPATHIC CHRONIC GOUT OF MULTIPLE SITES WITHOUT TOPHUS: ICD-10-CM

## 2023-07-19 DIAGNOSIS — R73.03 BORDERLINE DIABETES: ICD-10-CM

## 2023-07-19 DIAGNOSIS — M15.9 PRIMARY OSTEOARTHRITIS INVOLVING MULTIPLE JOINTS: ICD-10-CM

## 2023-07-19 DIAGNOSIS — M06.09 RHEUMATOID ARTHRITIS OF MULTIPLE SITES WITH NEGATIVE RHEUMATOID FACTOR: Primary | ICD-10-CM

## 2023-07-19 PROBLEM — M25.50 ARTHRALGIA: Status: RESOLVED | Noted: 2023-07-19 | Resolved: 2023-07-19

## 2023-07-19 PROBLEM — M15.0 PRIMARY OSTEOARTHRITIS INVOLVING MULTIPLE JOINTS: Status: ACTIVE | Noted: 2023-07-19

## 2023-07-19 PROBLEM — M25.50 ARTHRALGIA: Status: ACTIVE | Noted: 2023-07-19

## 2023-07-19 PROCEDURE — 73630 X-RAY EXAM OF FOOT: CPT | Mod: TC,LT

## 2023-07-19 PROCEDURE — 1159F MED LIST DOCD IN RCRD: CPT | Mod: CPTII,,, | Performed by: INTERNAL MEDICINE

## 2023-07-19 PROCEDURE — 1159F PR MEDICATION LIST DOCUMENTED IN MEDICAL RECORD: ICD-10-PCS | Mod: CPTII,,, | Performed by: INTERNAL MEDICINE

## 2023-07-19 PROCEDURE — 1101F PR PT FALLS ASSESS DOC 0-1 FALLS W/OUT INJ PAST YR: ICD-10-PCS | Mod: CPTII,,, | Performed by: INTERNAL MEDICINE

## 2023-07-19 PROCEDURE — 3288F FALL RISK ASSESSMENT DOCD: CPT | Mod: CPTII,,, | Performed by: INTERNAL MEDICINE

## 2023-07-19 PROCEDURE — 3078F PR MOST RECENT DIASTOLIC BLOOD PRESSURE < 80 MM HG: ICD-10-PCS | Mod: CPTII,,, | Performed by: INTERNAL MEDICINE

## 2023-07-19 PROCEDURE — 73130 X-RAY EXAM OF HAND: CPT | Mod: TC,LT

## 2023-07-19 PROCEDURE — 3077F PR MOST RECENT SYSTOLIC BLOOD PRESSURE >= 140 MM HG: ICD-10-PCS | Mod: CPTII,,, | Performed by: INTERNAL MEDICINE

## 2023-07-19 PROCEDURE — 3077F SYST BP >= 140 MM HG: CPT | Mod: CPTII,,, | Performed by: INTERNAL MEDICINE

## 2023-07-19 PROCEDURE — 1101F PT FALLS ASSESS-DOCD LE1/YR: CPT | Mod: CPTII,,, | Performed by: INTERNAL MEDICINE

## 2023-07-19 PROCEDURE — 99205 OFFICE O/P NEW HI 60 MIN: CPT | Mod: S$PBB,,, | Performed by: INTERNAL MEDICINE

## 2023-07-19 PROCEDURE — 99205 PR OFFICE/OUTPT VISIT, NEW, LEVL V, 60-74 MIN: ICD-10-PCS | Mod: S$PBB,,, | Performed by: INTERNAL MEDICINE

## 2023-07-19 PROCEDURE — 71046 X-RAY EXAM CHEST 2 VIEWS: CPT | Mod: TC

## 2023-07-19 PROCEDURE — 3288F PR FALLS RISK ASSESSMENT DOCUMENTED: ICD-10-PCS | Mod: CPTII,,, | Performed by: INTERNAL MEDICINE

## 2023-07-19 PROCEDURE — 73630 X-RAY EXAM OF FOOT: CPT | Mod: TC,RT

## 2023-07-19 PROCEDURE — 3078F DIAST BP <80 MM HG: CPT | Mod: CPTII,,, | Performed by: INTERNAL MEDICINE

## 2023-07-19 PROCEDURE — 73130 X-RAY EXAM OF HAND: CPT | Mod: TC,RT

## 2023-07-19 PROCEDURE — 99215 OFFICE O/P EST HI 40 MIN: CPT | Mod: PBBFAC,25 | Performed by: INTERNAL MEDICINE

## 2023-07-19 RX ORDER — OMEPRAZOLE 40 MG/1
40 CAPSULE, DELAYED RELEASE ORAL
COMMUNITY
Start: 2023-07-07

## 2023-07-19 RX ORDER — DEXTROMETHORPHAN HYDROBROMIDE, GUAIFENESIN 5; 100 MG/5ML; MG/5ML
650 LIQUID ORAL EVERY 8 HOURS PRN
COMMUNITY

## 2023-07-19 RX ORDER — NAPROXEN SODIUM 220 MG
220 TABLET ORAL 2 TIMES DAILY PRN
COMMUNITY
End: 2024-03-08

## 2023-07-19 RX ORDER — IRBESARTAN 300 MG/1
300 TABLET ORAL NIGHTLY
COMMUNITY

## 2023-07-19 RX ORDER — IBUPROFEN 200 MG
200 TABLET ORAL EVERY 6 HOURS PRN
COMMUNITY
End: 2024-03-08

## 2023-07-19 RX ORDER — FAMOTIDINE 40 MG/1
TABLET, FILM COATED ORAL
COMMUNITY
Start: 2023-07-03 | End: 2024-03-08

## 2023-07-19 RX ORDER — ROSUVASTATIN CALCIUM 10 MG/1
10 TABLET, COATED ORAL NIGHTLY
COMMUNITY
End: 2024-03-08

## 2023-07-19 RX ORDER — METFORMIN HYDROCHLORIDE 500 MG/1
500 TABLET ORAL DAILY
COMMUNITY

## 2023-07-19 RX ORDER — DENOSUMAB 60 MG/ML
INJECTION SUBCUTANEOUS
COMMUNITY
Start: 2023-05-16

## 2023-07-19 NOTE — PROGRESS NOTES
Patient ID: [id number]     Chief Complaint: Gout and Disease Management (Deformity of bilateral hands. Losing strength in her ankles.0 )      Referred By: Cheri Roth     HPI:     Ana Appiah is a 83 y.o. female here today for a new patient visit.      Complaining of pain in bilateral hands and left ankle.  She has pain in bilateral hands for more than 5 years but getting worse for the last 1-2 years.  Admits swelling in her knuckles intermittently.  Morning stiffness for more than couple of hours.  Admits DJD of back and intermittent chronic back and hip pain.  History of left hip replacement.  Admits history of gout in left ankle, allopurinol 100 mg daily was prescribed by her PCP, currently ran out of it and she will request refill on allopurinol.  She has compression fracture of lumbar vertebrae, currently on treatment for osteoporosis with Prolia.  Prolia started in 2023.  Her PCP is managing treatment of osteoporosis.  History of squamous cell skin cancer on abdomen status post resection.  No other cancers.  Follows with dermatology regularly.  Recently diagnosed with peptic ulcer, currently taking famotidine and Prilosec.    Denies history of fevers, rashes, photosensitivity, oral or nasal ulcers, h/o MI, stroke, seizures, h/o PE or DVT, Raynaud's phenomenon, uveitis.   Family history of autoimmune disease:  None  Smoking:  Former smoker.    Social History     Tobacco Use   Smoking Status Former    Types: Cigarettes    Quit date:     Years since quittin.5    Passive exposure: Never   Smokeless Tobacco Never          ----------------------------  Diabetes mellitus  Gout, unspecified  Hypertension     Past Surgical History:   Procedure Laterality Date    HIP REPLACEMENT ARTHROPLASTY Left     HYSTERECTOMY      Right hand surgery         Review of patient's allergies indicates:   Allergen Reactions    Penicillins      Other reaction(s): SOB, SWELLING AT INJ. SITE        Outpatient Medications Marked as Taking for the 23 encounter (Office Visit) with Annika Carbajal MD   Medication Sig Dispense Refill    acetaminophen (TYLENOL) 650 MG TbSR Take 650 mg by mouth every 8 (eight) hours as needed.      allopurinoL (ZYLOPRIM) 100 MG tablet Take 1 tablet by mouth once daily.      DULoxetine (CYMBALTA) 30 MG capsule Take 60 mg by mouth 2 (two) times daily.      irbesartan (AVAPRO) 300 MG tablet Take 300 mg by mouth every evening.      metFORMIN (GLUCOPHAGE) 500 MG tablet Take 500 mg by mouth once daily. WITH DINNER      naproxen sodium (ANAPROX) 220 MG tablet Take 220 mg by mouth 2 (two) times daily as needed. TAKE WITH MEALS      ondansetron (ZOFRAN-ODT) 8 MG TbDL Take 1 tablet (8 mg total) by mouth every 8 (eight) hours as needed (nausea). 20 tablet 0    rosuvastatin (CRESTOR) 10 MG tablet Take 10 mg by mouth every evening.         Social History     Socioeconomic History    Marital status:    Tobacco Use    Smoking status: Former     Types: Cigarettes     Quit date:      Years since quittin.5     Passive exposure: Never    Smokeless tobacco: Never   Substance and Sexual Activity    Alcohol use: Yes     Comment: socially    Drug use: Yes     Types: Marijuana     Comment: Medical marijuana        Family History   Problem Relation Age of Onset    Diabetes Mellitus Mother     Cancer Sister           There is no immunization history on file for this patient.    Patient Care Team:  Cheri Roth NP as PCP - General (Internal Medicine)  Cheri Roth NP (Inactive) (Family Medicine)     Subjective:     Constitutional:  Denies chills. Denies fever. Denies night sweats. Denies weight loss.   Ophthalmology: Denies blurred vision. Denies dry eyes. Denies eye pain. Denies Itching and redness.   ENT: Denies oral ulcers. Denies epistaxis. Denies dry mouth. Denies swollen glands.   Endocrine: Denies diabetes. Denies thyroid Problems.   Respiratory:  "Denies cough. Denies shortness of breath. Denies shortness of breath with exertion. Denies hemoptysis.   Cardiovascular: Denies chest pain at rest. Denies chest pain with exertion. Denies palpitations.    Gastrointestinal: Admits abdominal pain form peptic ulcer. Denies diarrhea. Denies nausea. Denies vomiting. Denies hematemesis or hematochezia. Denies heartburn.  Genitourinary: Denies blood in urine.  Musculoskeletal: See HPI for details  Integumentary: Denies rash. Denies photosensitivity.   Peripheral Vascular: Denies Ulcers of hands and/or feet. Denies Cold extremities.   Neurologic: Denies dizziness. Denies headache.  Denies loss of strength. Denies numbness or tingling.   Psychiatric: Denies depression. Denies anxiety. Denies suicidal/homicidal ideations.      Objective:     BP (!) 158/78 (BP Location: Right arm, Patient Position: Sitting, BP Method: Large (Automatic))   Pulse 62   Temp 97.9 °F (36.6 °C) (Oral)   Resp 20   Ht 5' 2" (1.575 m)   Wt 72.2 kg (159 lb 3.2 oz)   BMI 29.12 kg/m²     Physical Exam    General Appearance: alert, pleasant, in no acute distress.  Skin: Skin color, texture, turgor normal. No rashes or lesions. Multiple moles and actinic keratosis on the body.  Eyes:  extraocular movement intact (EOMI), pupils equal, round, reactive to light and accommodation, conjunctiva clear.  ENT: No oral or nasal ulcers.  Neck:  Neck supple. No adenopathy.   Lungs: CTA throughout without crackles, rhonchi, or wheezes.   Heart: RRR w/o murmurs.  No edema  Abdomen: Soft, non-tender, no masses, rebound or guarding.  Neuro: Alert, oriented, CN II-XII GI, sensory and motor innervation intact.  Musculoskeletal:  Tenderness and mild swelling of MCPs in right hand.  No tenderness in the MCPs of left hand.  DJD changes bilaterally.  Heberden's nodes bilaterally.  Squaring of bilateral CMC joints.  Decreased range of motion of spine.  Decreased range of bilateral shoulders.  Tenderness with range of motion " of left ankle especially with subtalar motion.  Tenderness in left 3rd and 4th MTPs.  Psych: Alert, oriented, normal eye contact.      Labs:   1/10/23:  CBC okay.  Creatinine 1.23, GFR 44.  CMP okay otherwise.  Intermittent elevation of creatinine since 2019.  Creatinine stable.  Rheumatoid factor negative.    Imaging:     CT pelvis March 2023 showed DJD of lumbar spine, right hip, asymmetric appearance of DJD at sacroiliac joints.  Left hip arthroplasty in good position.  Diverticulosis present.  3/2023:  CT lumbar spine showed degenerative disc disease and spondylosis of lumbar spine.  L2 and L3 chronic compression deformities, L5/S1 spondylolisthesis.    Assessment:       ICD-10-CM ICD-9-CM   1. Rheumatoid arthritis of multiple sites with negative rheumatoid factor  M06.09 714.0   2. High risk medication use  Z79.899 V58.69   3. Primary hypertension  I10 401.9   4. Primary osteoarthritis involving multiple joints  M15.9 715.98   5. Borderline diabetes  R73.03 790.29   6. Hyperlipidemia, unspecified hyperlipidemia type  E78.5 272.4   7. Idiopathic chronic gout of multiple sites without tophus  M1A.09X0 274.02   8. Age-related osteoporosis without current pathological fracture  M81.0 733.01        Plan:     1. Rheumatoid arthritis of multiple sites with negative rheumatoid factor:  Rheumatoid factor negative.  Synovitis in bilateral MCPs on MTPs on exam today.  She has mild rheumatoid arthritis.  Discussed the disease course and treatment options of rheumatoid arthritis.  Ordered labs and x-rays.  After test results will start her on methotrexate 10 mg per week and after 2 weeks increase dose to 15 mg per week.  Also we will start folic acid 1 mg daily.  Discussed the risks and benefits of medication with the patient.  - max dose of methotrexate is 15 mg per week due to CKD.     2. High risk medication use: Persons with rheumatoid arthritis, lupus, psoriatic arthritis and other autoimmune diseases are at increased  risk of cardiovascular disease including heart attack and stroke. We recommend that all patients with these conditions have annual health maintenance exams including lipid measurements, blood pressure measurements, and smoking cessation counseling when applicable at their primary care provider's office.   -Advised to stay up-to-date on age appropriate vaccinations and malignancy screening, including yearly skin exams.    - refused vaccination today.     3. Osteoarthritis:  Osteoarthritis of bilateral hands, generalized osteoarthritis.  History of left hip replacement.  DJD of spine.  Advised to take Tylenol as needed for joint pain.  Okay to use diclofenac topical cream 3 to 4 times a day for hand osteoarthritis.     4. Primary hypertension, HLD and borderline DM: follow up with PCP.      5. Osteoporosis:  Compression fracture of lumbar vertebrae.  She was started on Prolia in June 2023 by her PCP.  PCP managing treatment of osteoporosis.     6. Idiopathic chronic gout of multiple sites without tophus:  She is on allopurinol 100 mg daily.  Will check uric acid level.          Discussed risks and benefits of Methotrexate (MTX) in detail. MTX is an immunosuppressant medication.  When used in high doses (such as in cancer), it may have many side effects.  The doses that are used in rheumatological disorders are much lower.  Side effects were explained to the patient, including kidney and liver damage, bone marrow suppression, increased infection risk, mouth sores, hair loss, nausea, GI symptoms.   Start Methotrexate at 10mg per week.  Start folic 1mg daily to prevent some of the side effects of methotrexate.  Labwork: CBC and a CMP should be checked at baseline, monthly for the first few months and every 3 months afterwards.  Check a Hepatitis Panel and a Chest X-Ray prior to initiation of methotrexate.  Patient to call with any concerns and adverse effects of MTX.    Methotrexate is also teratogenic, so birth control  is needed while on this medication if applicable.  Counselled on limiting alcohol use to 1-2 drinks/week while on methotrexate given potential liver toxicity.     Follow up in about 4 weeks (around 8/16/2023) for with NP. In addition to their scheduled follow up, the patient has also been instructed to follow up on as needed basis.        Total time spent with patient and documentation is more than 60 minutes. All questions were answered to patient's satisfaction and patient verbalized understanding.

## 2023-07-20 RX ORDER — METHOTREXATE 2.5 MG/1
TABLET ORAL
Qty: 30 TABLET | Refills: 0 | Status: SHIPPED | OUTPATIENT
Start: 2023-07-20 | End: 2024-03-08 | Stop reason: SDUPTHER

## 2023-07-20 RX ORDER — FOLIC ACID 1 MG/1
1 TABLET ORAL DAILY
Qty: 30 TABLET | Refills: 6 | Status: SHIPPED | OUTPATIENT
Start: 2023-07-20 | End: 2024-03-08 | Stop reason: SDUPTHER

## 2023-07-20 NOTE — TELEPHONE ENCOUNTER
Pt's daughter, Teresa, notified of lab results and recommendations listed. Teresa states she picked allopurinol on 7/19. Notified that mtx and folic acid sent to pharmacy today. Teresa verbalized understanding.

## 2023-07-20 NOTE — TELEPHONE ENCOUNTER
Reviewed her labs, kidney function is slightly low but better than before.  Uric acid level is elevated 6.9, she can start taking allopurinol.  Other labs so far are okay.  Okay to start methotrexate, send prescription for methotrexate and folic acid to her pharmacy.

## 2023-09-20 ENCOUNTER — HOSPITAL ENCOUNTER (EMERGENCY)
Facility: HOSPITAL | Age: 83
Discharge: HOME | End: 2023-09-20
Attending: STUDENT IN AN ORGANIZED HEALTH CARE EDUCATION/TRAINING PROGRAM
Payer: MEDICARE

## 2023-09-20 VITALS
BODY MASS INDEX: 28.89 KG/M2 | WEIGHT: 157 LBS | HEIGHT: 62 IN | OXYGEN SATURATION: 98 % | HEART RATE: 72 BPM | TEMPERATURE: 98 F | SYSTOLIC BLOOD PRESSURE: 167 MMHG | DIASTOLIC BLOOD PRESSURE: 72 MMHG | RESPIRATION RATE: 18 BRPM

## 2023-09-20 DIAGNOSIS — N30.00 ACUTE CYSTITIS WITHOUT HEMATURIA: Primary | ICD-10-CM

## 2023-09-20 DIAGNOSIS — R10.9 ABDOMINAL PAIN, UNSPECIFIED ABDOMINAL LOCATION: ICD-10-CM

## 2023-09-20 LAB
ALBUMIN SERPL-MCNC: 3.8 G/DL (ref 3.4–4.8)
ALBUMIN/GLOB SERPL: 1 RATIO (ref 1.1–2)
ALP SERPL-CCNC: 53 UNIT/L (ref 40–150)
ALT SERPL-CCNC: 9 UNIT/L (ref 0–55)
APPEARANCE UR: ABNORMAL
AST SERPL-CCNC: 14 UNIT/L (ref 5–34)
BACTERIA #/AREA URNS AUTO: ABNORMAL /HPF
BASOPHILS # BLD AUTO: 0.06 X10(3)/MCL
BASOPHILS NFR BLD AUTO: 0.9 %
BILIRUB SERPL-MCNC: 0.5 MG/DL
BILIRUB UR QL STRIP.AUTO: NEGATIVE
BUN SERPL-MCNC: 19 MG/DL (ref 9.8–20.1)
CALCIUM SERPL-MCNC: 9.3 MG/DL (ref 8.4–10.2)
CHLORIDE SERPL-SCNC: 109 MMOL/L (ref 98–107)
CO2 SERPL-SCNC: 22 MMOL/L (ref 23–31)
COLOR UR: YELLOW
CREAT SERPL-MCNC: 1.16 MG/DL (ref 0.55–1.02)
EOSINOPHIL # BLD AUTO: 0.38 X10(3)/MCL (ref 0–0.9)
EOSINOPHIL NFR BLD AUTO: 5.5 %
ERYTHROCYTE [DISTWIDTH] IN BLOOD BY AUTOMATED COUNT: 13.5 % (ref 11.5–17)
GFR SERPLBLD CREATININE-BSD FMLA CKD-EPI: 47 MLS/MIN/1.73/M2
GLOBULIN SER-MCNC: 3.8 GM/DL (ref 2.4–3.5)
GLUCOSE SERPL-MCNC: 171 MG/DL (ref 82–115)
GLUCOSE UR QL STRIP.AUTO: NEGATIVE
HCT VFR BLD AUTO: 42.1 % (ref 37–47)
HGB BLD-MCNC: 14 G/DL (ref 12–16)
HYALINE CASTS URNS QL MICRO: ABNORMAL /LPF
IMM GRANULOCYTES # BLD AUTO: 0.04 X10(3)/MCL (ref 0–0.04)
IMM GRANULOCYTES NFR BLD AUTO: 0.6 %
KETONES UR QL STRIP.AUTO: 15
LEUKOCYTE ESTERASE UR QL STRIP.AUTO: ABNORMAL
LIPASE SERPL-CCNC: 43 U/L
LYMPHOCYTES # BLD AUTO: 1.51 X10(3)/MCL (ref 0.6–4.6)
LYMPHOCYTES NFR BLD AUTO: 21.9 %
MAGNESIUM SERPL-MCNC: 1.7 MG/DL (ref 1.6–2.6)
MCH RBC QN AUTO: 29.5 PG (ref 27–31)
MCHC RBC AUTO-ENTMCNC: 33.3 G/DL (ref 33–36)
MCV RBC AUTO: 88.6 FL (ref 80–94)
MONOCYTES # BLD AUTO: 0.68 X10(3)/MCL (ref 0.1–1.3)
MONOCYTES NFR BLD AUTO: 9.8 %
NEUTROPHILS # BLD AUTO: 4.24 X10(3)/MCL (ref 2.1–9.2)
NEUTROPHILS NFR BLD AUTO: 61.3 %
NITRITE UR QL STRIP.AUTO: NEGATIVE
NRBC BLD AUTO-RTO: 0 %
PH UR STRIP.AUTO: 6 [PH]
PLATELET # BLD AUTO: 245 X10(3)/MCL (ref 130–400)
PMV BLD AUTO: 10.2 FL (ref 7.4–10.4)
POTASSIUM SERPL-SCNC: 3.7 MMOL/L (ref 3.5–5.1)
PROT SERPL-MCNC: 7.6 GM/DL (ref 5.8–7.6)
PROT UR QL STRIP.AUTO: >=300
RBC # BLD AUTO: 4.75 X10(6)/MCL (ref 4.2–5.4)
RBC #/AREA URNS AUTO: ABNORMAL /HPF
RBC UR QL AUTO: NEGATIVE
SODIUM SERPL-SCNC: 142 MMOL/L (ref 136–145)
SP GR UR STRIP.AUTO: >=1.03 (ref 1–1.03)
SQUAMOUS #/AREA URNS AUTO: ABNORMAL /HPF
UROBILINOGEN UR STRIP-ACNC: 0.2
WBC # SPEC AUTO: 6.91 X10(3)/MCL (ref 4.5–11.5)
WBC #/AREA URNS AUTO: ABNORMAL /HPF

## 2023-09-20 PROCEDURE — 99285 EMERGENCY DEPT VISIT HI MDM: CPT | Mod: 25

## 2023-09-20 PROCEDURE — 87088 URINE BACTERIA CULTURE: CPT | Performed by: STUDENT IN AN ORGANIZED HEALTH CARE EDUCATION/TRAINING PROGRAM

## 2023-09-20 PROCEDURE — 96360 HYDRATION IV INFUSION INIT: CPT | Mod: 59

## 2023-09-20 PROCEDURE — 25000003 PHARM REV CODE 250: Performed by: EMERGENCY MEDICINE

## 2023-09-20 PROCEDURE — 85025 COMPLETE CBC W/AUTO DIFF WBC: CPT | Performed by: STUDENT IN AN ORGANIZED HEALTH CARE EDUCATION/TRAINING PROGRAM

## 2023-09-20 PROCEDURE — 83690 ASSAY OF LIPASE: CPT | Performed by: STUDENT IN AN ORGANIZED HEALTH CARE EDUCATION/TRAINING PROGRAM

## 2023-09-20 PROCEDURE — 81001 URINALYSIS AUTO W/SCOPE: CPT | Performed by: STUDENT IN AN ORGANIZED HEALTH CARE EDUCATION/TRAINING PROGRAM

## 2023-09-20 PROCEDURE — 25500020 PHARM REV CODE 255: Performed by: EMERGENCY MEDICINE

## 2023-09-20 PROCEDURE — 80053 COMPREHEN METABOLIC PANEL: CPT | Performed by: STUDENT IN AN ORGANIZED HEALTH CARE EDUCATION/TRAINING PROGRAM

## 2023-09-20 PROCEDURE — 83735 ASSAY OF MAGNESIUM: CPT | Performed by: STUDENT IN AN ORGANIZED HEALTH CARE EDUCATION/TRAINING PROGRAM

## 2023-09-20 RX ORDER — SODIUM CHLORIDE 9 MG/ML
500 INJECTION, SOLUTION INTRAVENOUS
Status: COMPLETED | OUTPATIENT
Start: 2023-09-20 | End: 2023-09-20

## 2023-09-20 RX ORDER — SULFAMETHOXAZOLE AND TRIMETHOPRIM 800; 160 MG/1; MG/1
1 TABLET ORAL
Status: COMPLETED | OUTPATIENT
Start: 2023-09-20 | End: 2023-09-20

## 2023-09-20 RX ORDER — SULFAMETHOXAZOLE AND TRIMETHOPRIM 800; 160 MG/1; MG/1
1 TABLET ORAL 2 TIMES DAILY
Qty: 6 TABLET | Refills: 0 | Status: SHIPPED | OUTPATIENT
Start: 2023-09-20 | End: 2023-09-23

## 2023-09-20 RX ORDER — DICYCLOMINE HYDROCHLORIDE 20 MG/1
20 TABLET ORAL 2 TIMES DAILY
Qty: 20 TABLET | Refills: 0 | Status: SHIPPED | OUTPATIENT
Start: 2023-09-20 | End: 2023-10-20

## 2023-09-20 RX ADMIN — SODIUM CHLORIDE 500 ML: 9 INJECTION, SOLUTION INTRAVENOUS at 07:09

## 2023-09-20 RX ADMIN — IOPAMIDOL 100 ML: 755 INJECTION, SOLUTION INTRAVENOUS at 07:09

## 2023-09-20 RX ADMIN — SULFAMETHOXAZOLE AND TRIMETHOPRIM 1 TABLET: 800; 160 TABLET ORAL at 08:09

## 2023-09-20 NOTE — ED PROVIDER NOTES
Encounter Date: 2023       History     Chief Complaint   Patient presents with    Abdominal Pain     Pt to er c/o intermittent right upper abd pain onset one month ago.     HPI    83-year-old female with a past medical history of type 2 diabetes and hypertension presents emergency department for intermittent right upper quadrant abdominal pain that has been ongoing for last month.  States it is worsened when she eats.  States she notices certain foods makes it hurt more than others.  States it has been bothering her more recently than it has.  No fevers.  She also states she had a colonoscopy 2 weeks ago.  Had EGD as well.  States they had an adenoma to her stomach and some polyps in her colon.  She is been having left lower quadrant abdominal pain ever since has been worsening.  No diarrhea or constipation    Review of patient's allergies indicates:   Allergen Reactions    Penicillins      Other reaction(s): SOB, SWELLING AT INJ. SITE     Past Medical History:   Diagnosis Date    Diabetes mellitus     Gout, unspecified     Hypertension      Past Surgical History:   Procedure Laterality Date    HIP REPLACEMENT ARTHROPLASTY Left     HYSTERECTOMY      Right hand surgery       Family History   Problem Relation Age of Onset    Diabetes Mellitus Mother     Cancer Sister      Social History     Tobacco Use    Smoking status: Former     Current packs/day: 0.00     Types: Cigarettes     Quit date:      Years since quittin.7     Passive exposure: Never    Smokeless tobacco: Never   Substance Use Topics    Alcohol use: Yes     Comment: socially    Drug use: Yes     Types: Marijuana     Comment: Medical marijuana     Review of Systems   Constitutional:  Negative for fever.   HENT:  Negative for sore throat.    Respiratory:  Negative for cough and shortness of breath.    Cardiovascular:  Negative for chest pain.   Gastrointestinal:  Positive for abdominal pain. Negative for constipation, diarrhea, nausea and  vomiting.   Genitourinary:  Negative for dysuria.   Musculoskeletal:  Negative for back pain.   Skin:  Negative for rash.   Neurological:  Negative for weakness and headaches.   Hematological:  Does not bruise/bleed easily.   All other systems reviewed and are negative.      Physical Exam     Initial Vitals [09/20/23 1637]   BP Pulse Resp Temp SpO2   (!) 191/69 69 18 97.5 °F (36.4 °C) 98 %      MAP       --         Physical Exam    Nursing note and vitals reviewed.  Constitutional: She appears well-developed and well-nourished. No distress.   Cardiovascular:  Normal rate and regular rhythm.           Pulmonary/Chest: Breath sounds normal. No respiratory distress. She has no wheezes. She has no rhonchi. She has no rales.   Abdominal: Abdomen is soft. There is abdominal tenderness.     There is no rebound and no guarding.   Musculoskeletal:         General: No tenderness. Normal range of motion.     Neurological: She is alert and oriented to person, place, and time. She has normal strength.   Skin: Skin is warm. Capillary refill takes less than 2 seconds.         ED Course   Procedures  Labs Reviewed   URINALYSIS, REFLEX TO URINE CULTURE - Abnormal; Notable for the following components:       Result Value    Appearance, UA Hazy (*)     Protein, UA >=300 (*)     Ketones, UA 15 (*)     Leukocyte Esterase, UA Trace (*)     All other components within normal limits   COMPREHENSIVE METABOLIC PANEL - Abnormal; Notable for the following components:    Chloride 109 (*)     Carbon Dioxide 22 (*)     Glucose Level 171 (*)     Creatinine 1.16 (*)     Globulin 3.8 (*)     Albumin/Globulin Ratio 1.0 (*)     All other components within normal limits   URINALYSIS, MICROSCOPIC - Abnormal; Notable for the following components:    Hyaline Casts, UA Few (*)     WBC, UA 11-20 (*)     Squamous Epithelial Cells, UA Few (*)     All other components within normal limits   LIPASE - Normal   MAGNESIUM - Normal   CULTURE, URINE   CBC W/ AUTO  DIFFERENTIAL    Narrative:     The following orders were created for panel order CBC auto differential.  Procedure                               Abnormality         Status                     ---------                               -----------         ------                     CBC with Differential[[id number]]                            Final result                 Please view results for these tests on the individual orders.   CBC WITH DIFFERENTIAL          Imaging Results              CT Abdomen Pelvis With Contrast (Final result)  Result time 09/20/23 19:23:02      Final result by Marco A Roth MD (09/20/23 19:23:02)                   Impression:      No acute process identified.      Electronically signed by: Marco A Roth  Date:    09/20/2023  Time:    19:23               Narrative:    EXAMINATION:  CT ABDOMEN PELVIS WITH CONTRAST    CLINICAL HISTORY:  Abdominal abscess/infection suspected;    TECHNIQUE:  CT imaging of the abdomen and pelvis after intravenous contrast. Dose length product 348 mGycm. Automatic exposure control, adjustment of mA/kV or iterative reconstruction technique used to limit radiation dose.    COMPARISON:  CT pelvis 03/28/2023    FINDINGS:  Liver/biliary: Mild generalized hepatic steatosis.  No radiodense gallstones. No intra or extrahepatic biliary ductal dilation.    Pancreas: Normal.    Spleen: Normal.    Adrenals: Normal.    Genitourinary: Symmetric renal enhancement. No hydronephrosis. Limited assessment of the bladder due to streak artifact and under distension, but no clear abnormality.    Stomach/bowel: Small hiatal hernia.  No bowel obstruction.  Appendix not confidently seen.  Distal colonic diverticulosis with no discernible bowel inflammation.    Lymph nodes/peritoneum: No pathologically enlarged lymph node identified. No ascites or free air. No fluid collection.    Vasculature: Numerous aortic calcifications.  No abdominal aortic aneurysm.    Abdominal wall: Subcutaneous  generator in the left lower back with leads extending to the central canal.    Lung bases: No consolidation or pleural effusion.    Musculoskeletal: No acute osseous findings. Prior left hip arthroplasty.                                       US Abdomen Limited (Final result)  Result time 09/20/23 18:22:12      Final result by Irene Jackson MD (09/20/23 18:22:12)                   Impression:      Simple cyst in the right kidney otherwise unremarkable      Electronically signed by: Irene Jackson  Date:    09/20/2023  Time:    18:22               Narrative:    EXAMINATION:  US ABDOMEN LIMITED    CLINICAL HISTORY:  ruq pain;    TECHNIQUE:  Multiple sagittal and transverse images were obtained of the abdomen to include the right upper quadrant.    COMPARISON:  None    FINDINGS:  The pancreas unremarkable.  No pancreatic mass or lesion is seen.    The visualized portion of the abdominal aorta appear grossly unremarkable.    The liver is normal in size.  It measures 13.6 cm.  No liver mass or lesion is seen.  Portal and hepatic veins appear normal.    The gallbladder appears normal.  No gallstones are seen.  No pericholecystic fluid is seen.  Gallbladder wall measures 2 mm.  Common bile duct measures 3 mm.    The right kidney measures 9.9 cm.  .    No hydronephrosis is seen.  No hydroureter is seen.  There is a simple cyst seen in the midpole.  It measures 2.8 x 2.6 x 2.5 cm..  No nephrolithiasis is seen.  Flow to the kidney appears normal.                                       Medications   0.9%  NaCl infusion ( Intravenous Stopped 9/20/23 2023)   iopamidoL (ISOVUE-370) injection 100 mL (100 mLs Intravenous Given 9/20/23 1911)   sulfamethoxazole-trimethoprim 800-160mg per tablet 1 tablet (1 tablet Oral Given 9/20/23 2006)     Medical Decision Making  differential diagnosis  Cholecystitis, symptomatic cholelithiasis, diverticulitis, bowel perforation, intra-abdominal infection, colitis,  as well as  multiple other possible etiologies      Amount and/or Complexity of Data Reviewed  Labs: ordered.  Radiology: ordered.    Risk  Prescription drug management.               ED Course as of 09/23/23 0351   Wed Sep 20, 2023   1845 Transition of care at 7 PM. Dr. Pearson will assume care and determine appropriate treatment and care of this patient.   [BS]   1907 Patient is an 82 yo F presenting with R upper abdominal pain. Recently had a colonoscopy. Found to have more focal pain in the LLQ on examination. Pending CT results. [GM]   1957 No acute findings on CT. Some ketones in urine so fluids ordered. Mild UTI so will give bactrim here and re-evaluate. Gap 11 [GM]   2021 Results were reviewed with patient. Questions were answered along with a discussion of signs and symptoms to return for. Patient comfortable with plan of discharge.   [GM]      ED Course User Index  [BS] Brant Kirkpatrick MD  [GM] Shelbi Pearson MD                    Clinical Impression:   Final diagnoses:  [N30.00] Acute cystitis without hematuria (Primary)  [R10.9] Abdominal pain, unspecified abdominal location - LLQ        ED Disposition Condition    Discharge Stable          ED Prescriptions       Medication Sig Dispense Start Date End Date Auth. Provider    dicyclomine (BENTYL) 20 mg tablet Take 1 tablet (20 mg total) by mouth 2 (two) times daily. 20 tablet 9/20/2023 10/20/2023 Shelbi Pearson MD    sulfamethoxazole-trimethoprim 800-160mg (BACTRIM DS) 800-160 mg Tab (Expires today) Take 1 tablet by mouth 2 (two) times daily. for 3 days 6 tablet 9/20/2023 9/23/2023 Shelbi Pearson MD          Follow-up Information       Follow up With Specialties Details Why Contact Info    Cheri Roth NP Internal Medicine  or your GI doctor at next available [address] [facility] [address] [phone]               Shelbi Pearson MD  09/23/23 5988

## 2023-09-22 LAB — BACTERIA UR CULT: NO GROWTH

## 2024-03-06 NOTE — PROGRESS NOTES
"  Patient ID: [id number]     Chief Complaint: rheumatoid arthirits (Pt stated joint pian fingers, hand ,toes and snkles)         HPI:     Ana Appiah is a 83 y.o. female here today for a follow up RA visit.      Initially evaluated in 2023 and diagnosed with Seronegative RA, presented with complaints of pain in bilateral hands and left ankle.  She has pain in bilateral hands for more than 5 years but getting worse for the last 1-2 years.  Admits swelling in her knuckles intermittently.  Morning stiffness for more than couple of hours.  Admits DJD of back and intermittent chronic back and hip pain.  History of left hip replacement. Admits history of Gout in left ankle, Allopurinol 100 mg daily was prescribed by her PCP, was out of meds at her last visit but since has resumed. She has a compression fracture of lumbar vertebrae, currently on treatment for Osteoporosis with Prolia.  Prolia was started in June 2023 and is managed by her PCP. History of squamous cell skin cancer on abdomen status post resection.  No other cancers, follows with Dermatology regularly. Recently diagnosed with peptic ulcer, currently taking famotidine and Prilosec.    March 2024: Here today for follow up. Started on MTX at her last visit however just recently started as she wanted clearance from her Cardiologist before she started. She has been on meds x 1 month and tolerating well, continue with joint pain to her hands, knees and feet. She denies any red/warm/swollen joints but states "just hurts". She now has a fracture of her left 5th toe, she was seen by [facility] and was put in post op shoe x 4 weeks and has f/u with PCP- states she rolled over in bed and felt a crack- currently on Prolia injections with PCP. Morning stiffness lasting 30 minutes.  She has been on Allopurinol for Gout and reports not flares.     PMH: Squamous cell ca (excision 2019 and then again tx with topical 2023), Peptic Ulcer, CKD, DM, HTN, HLD, " Osteoporosis, Hypothyroidism     Dr. Christianson- Dermatology  Dr. Akhtar- Cardiology   Dr. Kel Gomez- GI  Dr. Teixeira- Neurology- spinal stimulator     Denies history of fevers, rashes, photosensitivity, oral or nasal ulcers, h/o MI, stroke, seizures, h/o PE or DVT, Raynaud's phenomenon, uveitis.   Family history of autoimmune disease:  None  Smoking:  Former smoker. (Smoked from age 16 to , 1 ppd)- congratulated her on cessation!    Social History     Tobacco Use   Smoking Status Former    Current packs/day: 0.00    Types: Cigarettes    Quit date:     Years since quittin.    Passive exposure: Never   Smokeless Tobacco Never          ----------------------------  Diabetes mellitus  Gout, unspecified  Hypertension     Past Surgical History:   Procedure Laterality Date    HIP REPLACEMENT ARTHROPLASTY Left     HYSTERECTOMY      Right hand surgery         Review of patient's allergies indicates:   Allergen Reactions    Penicillins      Other reaction(s): SOB, SWELLING AT INJ. SITE       Outpatient Medications Marked as Taking for the 3/8/24 encounter (Office Visit) with Paola Diehl FNP   Medication Sig Dispense Refill    acetaminophen (TYLENOL) 650 MG TbSR Take 650 mg by mouth every 8 (eight) hours as needed.      allopurinoL (ZYLOPRIM) 100 MG tablet Take 1 tablet by mouth once daily.      amLODIPine (NORVASC) 10 MG tablet Take 10 mg by mouth once daily.      irbesartan (AVAPRO) 300 MG tablet Take 300 mg by mouth every evening.      levothyroxine (SYNTHROID) 50 MCG tablet Take 50 mcg by mouth before breakfast.      metFORMIN (GLUCOPHAGE) 500 MG tablet Take 500 mg by mouth once daily. WITH DINNER      omeprazole (PRILOSEC) 40 MG capsule Take 40 mg by mouth.      ondansetron (ZOFRAN-ODT) 8 MG TbDL Take 1 tablet (8 mg total) by mouth every 8 (eight) hours as needed (nausea). 20 tablet 0    pravastatin (PRAVACHOL) 10 MG tablet Take 10 mg by mouth every evening.      PROLIA 60 mg/mL Syrg  INJECT 1ML SUBCUTANEOUSLY ONCE FOR 1 DOSE      [DISCONTINUED] famotidine (PEPCID) 40 MG tablet SMARTSI Tablet(s) By Mouth Morning-Evening      [DISCONTINUED] folic acid (FOLVITE) 1 MG tablet Take 1 tablet (1 mg total) by mouth once daily. 30 tablet 6    [DISCONTINUED] methotrexate 2.5 MG Tab Take 4 tablets once a week for 2 weeks and increase dose to 6 tablets once a week. Hold for infection or fever. 30 tablet 0    [DISCONTINUED] rosuvastatin (CRESTOR) 10 MG tablet Take 10 mg by mouth every evening.         Social History     Socioeconomic History    Marital status:    Tobacco Use    Smoking status: Former     Current packs/day: 0.00     Types: Cigarettes     Quit date:      Years since quittin.     Passive exposure: Never    Smokeless tobacco: Never   Substance and Sexual Activity    Alcohol use: Not Currently     Comment: socially    Drug use: Yes     Types: Marijuana     Comment: Medical marijuana    Sexual activity: Not Currently        Family History   Problem Relation Age of Onset    Diabetes Mellitus Mother     Cancer Sister           There is no immunization history on file for this patient.    Patient Care Team:  Candie Clinton MD as PCP - General (Internal Medicine)  Cheri Roth NP (Inactive) (Family Medicine)     Subjective:     Constitutional:  Denies chills. Denies fever. Denies night sweats. Denies weight loss.   Ophthalmology: Denies blurred vision. Denies dry eyes. Denies eye pain. Denies Itching and redness.   ENT: Denies oral ulcers. Denies epistaxis. Denies dry mouth. Denies swollen glands.   Endocrine: Admits diabetes. Admits thyroid Problems.   Respiratory: Denies cough. Denies shortness of breath. Denies shortness of breath with exertion. Denies hemoptysis.   Cardiovascular: Denies chest pain at rest. Denies chest pain with exertion. Denies palpitations.    Gastrointestinal: Admits abdominal pain from peptic ulcer- no pain at this time. Denies diarrhea.  "Denies nausea. Denies vomiting. Denies hematemesis or hematochezia. Denies heartburn.  Genitourinary: Denies blood in urine.  Musculoskeletal: See HPI for details  Integumentary: Denies rash. Denies photosensitivity.   Peripheral Vascular: Denies Ulcers of hands and/or feet. Denies Cold extremities.   Neurologic: Denies dizziness. Denies headache.  Denies loss of strength. Denies numbness or tingling.   Psychiatric: Denies depression. Denies anxiety. Denies suicidal/homicidal ideations.      Objective:     /74 (BP Location: Left arm, Patient Position: Sitting, BP Method: Medium (Automatic))   Pulse 84   Temp 97.9 °F (36.6 °C) (Oral)   Resp 20   Ht 5' 2" (1.575 m)   Wt 74.8 kg (165 lb)   SpO2 95%   BMI 30.18 kg/m²     Physical Exam    General Appearance: alert, pleasant, in no acute distress.  Skin: Skin color, texture, turgor normal. No rashes or lesions. Multiple moles and actinic keratosis on the body.  Eyes:  extraocular movement intact (EOMI), pupils equal, round, reactive to light and accommodation, conjunctiva clear.  ENT: No oral or nasal ulcers.  Neck:  Neck supple. No adenopathy.   Lungs: CTA throughout without crackles, rhonchi, or wheezes.   Heart: RRR w/o murmurs.  No edema  Neuro: Alert, oriented, CN II-XII GI, sensory and motor innervation intact.  Musculoskeletal:  Tenderness and mild swelling of bilateral MCPs- 2nd/3rd.  DJD changes bilaterally.  Heberden's nodes bilaterally.  Squaring of bilateral CMC joints.  Decreased range of motion of spine.  Decreased range of bilateral shoulders.  No tenderness to bilateral ankles today. No tenderness to MTPs on right, left 1st/2nd/3rd with no tenderness, did not assess 4th/5th due to fracture.   Psych: Alert, oriented, normal eye contact.      Labs:   1/10/23:  CBC okay.  Creatinine 1.23, GFR 44.  CMP okay otherwise.  Intermittent elevation of creatinine since 2019.  Creatinine stable.  Rheumatoid factor negative.    07/19/2023: creatinine 1.16, " better than before, GFR 47.  CMP okay.  Uric acid 6.9.  CBC okay.  Negative hepatitis-B, hepatitis-C, HIV, QuantiFERON TB.  CRP, ESR normal.    Imaging:   CT pelvis March 2023 showed DJD of lumbar spine, right hip, asymmetric appearance of DJD at sacroiliac joints.  Left hip arthroplasty in good position.  Diverticulosis present.  3/2023:  CT lumbar spine showed degenerative disc disease and spondylosis of lumbar spine.  L2 and L3 chronic compression deformities, L5/S1 spondylolisthesis.    7/20/23: CXR normal.     7/21/23:  Normal x-ray of bilateral feet.  DJD changes in bilateral hands.  Marginal periarticular erosive changes in the IP joints bilaterally.    3/6/2024 Left Foot Xray: Fifth proximal phalanx fracture.     Assessment:       ICD-10-CM ICD-9-CM   1. Rheumatoid arthritis of multiple sites with negative rheumatoid factor  M06.09 714.0   2. High risk medication use  Z79.899 V58.69   3. Primary osteoarthritis involving multiple joints  M15.9 715.98   4. Primary hypertension  I10 401.9   5. Chronic gout of left ankle, unspecified cause  M1A.0720 274.02   6. Closed fracture of phalanx of left fifth toe, sequela  S92.502S 905.4          Plan:     1. Rheumatoid arthritis of multiple sites with negative rheumatoid factor  Rheumatoid factor negative.  Synovitis in bilateral MCPs and MTPs on initial exam.  She has mild Rheumatoid Arthritis. She was started on MTX 15 mg po qd with folic acid in July but just started roughly 4 weeks ago. Today on exam, synovitis remains to bilateral 2nd/3rd MCPs.   - Continue MTX 15 mg po qweek with folic acid 1 mg po qd- she just took her 2nd dose of 15 mg- discussed can take several weeks for full benefit of meds- will continue at 15 mg po qd due to CKD.   - Continue Diclofenac topically as directed - if desired we can consider compounded topical in the future.   - Enc use of arthritis gloves at night  - Lab today for continued monitoring  - Infection w/u negative 7/2023  - Cxray  ok July 2023     2. High risk medication use  - Persons with rheumatoid arthritis, lupus, psoriatic arthritis and other autoimmune diseases are at increased risk of cardiovascular disease including heart attack and stroke. We recommend that all patients with these conditions have annual health maintenance exams including lipid measurements, blood pressure measurements, and smoking cessation counseling when applicable at their primary care provider's office.   -Completed Mammogram and Pap screenings, defers further Mammograms, Colonoscopy 10/2023 UTD.   -Advised to stay up-to-date on age appropriate vaccinations and malignancy screening, including yearly skin exams.  Continue to follow up with Dermatology for skin checks.   - Refused vaccination today.     3. Osteoarthritis  - Osteoarthritis of bilateral hands, generalized Osteoarthritis.  History of left hip replacement.  DJD of spine.  Advised to take Tylenol as needed for joint pain.  Okay to use Diclofenac topical cream 3 to 4 times a day for hand Osteoarthritis.     4. Primary HTN, HLD and borderline DM  -Current stable and at goal today, continue to follow up with PCP.   -Last A1C 5.6 per report      5. Osteoporosis  -  Compression fracture of lumbar vertebrae.  She was started on Prolia in June 2023 by her PCP.  PCP managing treatment of Osteoporosis.  - Continue Ca+ Vitamin D with weight bearing exercises as tolerated.   - 3/6/2024 Now with Left Foot Xray: Fifth proximal phalanx fracture.  Currently in post op shoe has follow up with PCP in 4 weeks for repeat Xrays      6. Idiopathic chronic gout of multiple sites without tophus  -  She is on Allopurinol 100 mg daily.    - Uric Acid 6.9 July 2023, repeat today           Follow up in about 3 months (around 6/8/2024) for NP Follow Up. In addition to their scheduled follow up, the patient has also been instructed to follow up on as needed basis.        Total time spent with patient and documentation is 40  minutes. All questions were answered to patient's satisfaction and patient verbalized understanding.

## 2024-03-08 ENCOUNTER — TELEPHONE (OUTPATIENT)
Dept: RHEUMATOLOGY | Facility: CLINIC | Age: 84
End: 2024-03-08

## 2024-03-08 ENCOUNTER — LAB VISIT (OUTPATIENT)
Dept: LAB | Facility: HOSPITAL | Age: 84
End: 2024-03-08
Attending: NURSE PRACTITIONER
Payer: MEDICARE

## 2024-03-08 ENCOUNTER — OFFICE VISIT (OUTPATIENT)
Dept: RHEUMATOLOGY | Facility: CLINIC | Age: 84
End: 2024-03-08
Payer: MEDICARE

## 2024-03-08 VITALS
OXYGEN SATURATION: 95 % | BODY MASS INDEX: 30.36 KG/M2 | WEIGHT: 165 LBS | RESPIRATION RATE: 20 BRPM | SYSTOLIC BLOOD PRESSURE: 115 MMHG | HEIGHT: 62 IN | HEART RATE: 84 BPM | DIASTOLIC BLOOD PRESSURE: 74 MMHG | TEMPERATURE: 98 F

## 2024-03-08 DIAGNOSIS — M06.09 RHEUMATOID ARTHRITIS OF MULTIPLE SITES WITH NEGATIVE RHEUMATOID FACTOR: ICD-10-CM

## 2024-03-08 DIAGNOSIS — S92.502S CLOSED FRACTURE OF PHALANX OF LEFT FIFTH TOE, SEQUELA: ICD-10-CM

## 2024-03-08 DIAGNOSIS — M1A.0720 CHRONIC GOUT OF LEFT ANKLE, UNSPECIFIED CAUSE: ICD-10-CM

## 2024-03-08 DIAGNOSIS — Z79.899 HIGH RISK MEDICATION USE: ICD-10-CM

## 2024-03-08 DIAGNOSIS — M06.09 RHEUMATOID ARTHRITIS OF MULTIPLE SITES WITH NEGATIVE RHEUMATOID FACTOR: Primary | ICD-10-CM

## 2024-03-08 DIAGNOSIS — I10 PRIMARY HYPERTENSION: ICD-10-CM

## 2024-03-08 DIAGNOSIS — M15.9 PRIMARY OSTEOARTHRITIS INVOLVING MULTIPLE JOINTS: ICD-10-CM

## 2024-03-08 PROBLEM — M1A.0790 CHRONIC GOUT OF ANKLE: Status: ACTIVE | Noted: 2021-01-18

## 2024-03-08 PROBLEM — N28.9 RENAL INSUFFICIENCY: Status: ACTIVE | Noted: 2024-03-08

## 2024-03-08 PROBLEM — E03.8 SUBCLINICAL HYPOTHYROIDISM: Status: ACTIVE | Noted: 2022-05-24

## 2024-03-08 PROBLEM — K63.5 COLON POLYPS: Status: ACTIVE | Noted: 2023-10-06

## 2024-03-08 PROBLEM — E11.65 TYPE 2 DIABETES MELLITUS WITH HYPERGLYCEMIA, WITHOUT LONG-TERM CURRENT USE OF INSULIN: Status: ACTIVE | Noted: 2021-08-05

## 2024-03-08 PROBLEM — N18.31 STAGE 3A CHRONIC KIDNEY DISEASE: Status: ACTIVE | Noted: 2022-02-08

## 2024-03-08 PROBLEM — C44.90 MALIGNANT NEOPLASM OF SKIN: Status: ACTIVE | Noted: 2024-03-08

## 2024-03-08 PROBLEM — M16.12 OSTEOARTHRITIS OF LEFT HIP: Status: ACTIVE | Noted: 2024-03-08

## 2024-03-08 PROBLEM — M81.0 OSTEOPOROSIS: Status: ACTIVE | Noted: 2024-03-08

## 2024-03-08 PROBLEM — E11.21 DIABETIC NEPHROPATHY: Status: ACTIVE | Noted: 2023-05-17

## 2024-03-08 LAB
ALBUMIN SERPL-MCNC: 4 G/DL (ref 3.4–4.8)
ALBUMIN/GLOB SERPL: 1.1 RATIO (ref 1.1–2)
ALP SERPL-CCNC: 54 UNIT/L (ref 40–150)
ALT SERPL-CCNC: 13 UNIT/L (ref 0–55)
AST SERPL-CCNC: 16 UNIT/L (ref 5–34)
BASOPHILS # BLD AUTO: 0.07 X10(3)/MCL
BASOPHILS NFR BLD AUTO: 1 %
BILIRUB SERPL-MCNC: 0.4 MG/DL
BUN SERPL-MCNC: 27.1 MG/DL (ref 9.8–20.1)
CALCIUM SERPL-MCNC: 10.4 MG/DL (ref 8.4–10.2)
CHLORIDE SERPL-SCNC: 107 MMOL/L (ref 98–107)
CO2 SERPL-SCNC: 25 MMOL/L (ref 23–31)
CREAT SERPL-MCNC: 1.19 MG/DL (ref 0.55–1.02)
CRP SERPL-MCNC: 1 MG/L
EOSINOPHIL # BLD AUTO: 0.35 X10(3)/MCL (ref 0–0.9)
EOSINOPHIL NFR BLD AUTO: 5.1 %
ERYTHROCYTE [DISTWIDTH] IN BLOOD BY AUTOMATED COUNT: 14.4 % (ref 11.5–17)
ERYTHROCYTE [SEDIMENTATION RATE] IN BLOOD: 9 MM/HR (ref 0–20)
GFR SERPLBLD CREATININE-BSD FMLA CKD-EPI: 45 MLS/MIN/1.73/M2
GLOBULIN SER-MCNC: 3.6 GM/DL (ref 2.4–3.5)
GLUCOSE SERPL-MCNC: 69 MG/DL (ref 82–115)
HCT VFR BLD AUTO: 41.8 % (ref 37–47)
HGB BLD-MCNC: 13.8 G/DL (ref 12–16)
IMM GRANULOCYTES # BLD AUTO: 0.08 X10(3)/MCL (ref 0–0.04)
IMM GRANULOCYTES NFR BLD AUTO: 1.2 %
LYMPHOCYTES # BLD AUTO: 1.6 X10(3)/MCL (ref 0.6–4.6)
LYMPHOCYTES NFR BLD AUTO: 23.1 %
MCH RBC QN AUTO: 30.3 PG (ref 27–31)
MCHC RBC AUTO-ENTMCNC: 33 G/DL (ref 33–36)
MCV RBC AUTO: 91.7 FL (ref 80–94)
MONOCYTES # BLD AUTO: 1.02 X10(3)/MCL (ref 0.1–1.3)
MONOCYTES NFR BLD AUTO: 14.7 %
NEUTROPHILS # BLD AUTO: 3.81 X10(3)/MCL (ref 2.1–9.2)
NEUTROPHILS NFR BLD AUTO: 54.9 %
NRBC BLD AUTO-RTO: 0 %
PLATELET # BLD AUTO: 249 X10(3)/MCL (ref 130–400)
PMV BLD AUTO: 9.7 FL (ref 7.4–10.4)
POTASSIUM SERPL-SCNC: 5 MMOL/L (ref 3.5–5.1)
PROT SERPL-MCNC: 7.6 GM/DL (ref 5.8–7.6)
RBC # BLD AUTO: 4.56 X10(6)/MCL (ref 4.2–5.4)
SODIUM SERPL-SCNC: 142 MMOL/L (ref 136–145)
URATE SERPL-MCNC: 7.1 MG/DL (ref 2.6–6)
WBC # SPEC AUTO: 6.93 X10(3)/MCL (ref 4.5–11.5)

## 2024-03-08 PROCEDURE — 3074F SYST BP LT 130 MM HG: CPT | Mod: CPTII,,, | Performed by: NURSE PRACTITIONER

## 2024-03-08 PROCEDURE — 80053 COMPREHEN METABOLIC PANEL: CPT

## 2024-03-08 PROCEDURE — 86140 C-REACTIVE PROTEIN: CPT

## 2024-03-08 PROCEDURE — 36415 COLL VENOUS BLD VENIPUNCTURE: CPT

## 2024-03-08 PROCEDURE — 1101F PT FALLS ASSESS-DOCD LE1/YR: CPT | Mod: CPTII,,, | Performed by: NURSE PRACTITIONER

## 2024-03-08 PROCEDURE — 1160F RVW MEDS BY RX/DR IN RCRD: CPT | Mod: CPTII,,, | Performed by: NURSE PRACTITIONER

## 2024-03-08 PROCEDURE — 99214 OFFICE O/P EST MOD 30 MIN: CPT | Mod: S$PBB,,, | Performed by: NURSE PRACTITIONER

## 2024-03-08 PROCEDURE — 1125F AMNT PAIN NOTED PAIN PRSNT: CPT | Mod: CPTII,,, | Performed by: NURSE PRACTITIONER

## 2024-03-08 PROCEDURE — 1159F MED LIST DOCD IN RCRD: CPT | Mod: CPTII,,, | Performed by: NURSE PRACTITIONER

## 2024-03-08 PROCEDURE — 3078F DIAST BP <80 MM HG: CPT | Mod: CPTII,,, | Performed by: NURSE PRACTITIONER

## 2024-03-08 PROCEDURE — 85025 COMPLETE CBC W/AUTO DIFF WBC: CPT

## 2024-03-08 PROCEDURE — 99214 OFFICE O/P EST MOD 30 MIN: CPT | Mod: PBBFAC | Performed by: NURSE PRACTITIONER

## 2024-03-08 PROCEDURE — 84550 ASSAY OF BLOOD/URIC ACID: CPT

## 2024-03-08 PROCEDURE — 3288F FALL RISK ASSESSMENT DOCD: CPT | Mod: CPTII,,, | Performed by: NURSE PRACTITIONER

## 2024-03-08 PROCEDURE — 85652 RBC SED RATE AUTOMATED: CPT

## 2024-03-08 RX ORDER — PRAVASTATIN SODIUM 10 MG/1
10 TABLET ORAL NIGHTLY
COMMUNITY

## 2024-03-08 RX ORDER — LEVOTHYROXINE SODIUM 50 UG/1
50 TABLET ORAL
COMMUNITY

## 2024-03-08 RX ORDER — AMLODIPINE BESYLATE 10 MG/1
10 TABLET ORAL DAILY
COMMUNITY

## 2024-03-08 RX ORDER — METHOTREXATE 2.5 MG/1
TABLET ORAL
Qty: 30 TABLET | Refills: 2 | Status: SHIPPED | OUTPATIENT
Start: 2024-03-08

## 2024-03-08 RX ORDER — DICLOFENAC SODIUM 10 MG/G
2 GEL TOPICAL 4 TIMES DAILY
Qty: 100 G | Refills: 5 | Status: SHIPPED | OUTPATIENT
Start: 2024-03-08

## 2024-03-08 RX ORDER — FOLIC ACID 1 MG/1
1 TABLET ORAL DAILY
Qty: 30 TABLET | Refills: 6 | Status: SHIPPED | OUTPATIENT
Start: 2024-03-08 | End: 2024-09-04

## 2024-03-08 NOTE — TELEPHONE ENCOUNTER
Spoke to pt's daughter Teresa inform of message. Pt's daughter verbalized understanding            ----- Message from CYNTHIA Weinstein sent at 3/8/2024 12:29 PM CST -----  Please reach out to Teresa, her daughter and advise of the following lab results, overall lab clinically acceptable- kidney functions stable, Glucose was on the lower end, please make sure she eats, Uric Acid is elevated (7.1)- Her PCP has been following, please fax over to her PCP for review, we will fax over copy of lab work so they can address, we will continue to monitor at future lab draws.

## 2024-04-09 DIAGNOSIS — M25.551 RIGHT HIP PAIN: Primary | ICD-10-CM

## 2024-05-29 ENCOUNTER — HOSPITAL ENCOUNTER (OUTPATIENT)
Dept: RADIOLOGY | Facility: CLINIC | Age: 84
Discharge: HOME | End: 2024-05-29
Attending: SPECIALIST
Payer: MEDICARE

## 2024-05-29 ENCOUNTER — OFFICE VISIT (OUTPATIENT)
Dept: ORTHOPEDICS | Facility: CLINIC | Age: 84
End: 2024-05-29
Payer: MEDICARE

## 2024-05-29 VITALS
HEIGHT: 62 IN | BODY MASS INDEX: 30.55 KG/M2 | HEART RATE: 70 BPM | DIASTOLIC BLOOD PRESSURE: 83 MMHG | SYSTOLIC BLOOD PRESSURE: 163 MMHG | WEIGHT: 166 LBS

## 2024-05-29 DIAGNOSIS — E11.9 DIABETES MELLITUS WITHOUT COMPLICATION: ICD-10-CM

## 2024-05-29 DIAGNOSIS — M25.551 RIGHT HIP PAIN: ICD-10-CM

## 2024-05-29 DIAGNOSIS — M16.11 PRIMARY OSTEOARTHRITIS OF RIGHT HIP: Primary | ICD-10-CM

## 2024-05-29 PROCEDURE — 3079F DIAST BP 80-89 MM HG: CPT | Mod: CPTII,,, | Performed by: SPECIALIST

## 2024-05-29 PROCEDURE — 3288F FALL RISK ASSESSMENT DOCD: CPT | Mod: CPTII,,, | Performed by: SPECIALIST

## 2024-05-29 PROCEDURE — 3077F SYST BP >= 140 MM HG: CPT | Mod: CPTII,,, | Performed by: SPECIALIST

## 2024-05-29 PROCEDURE — 1101F PT FALLS ASSESS-DOCD LE1/YR: CPT | Mod: CPTII,,, | Performed by: SPECIALIST

## 2024-05-29 PROCEDURE — 1160F RVW MEDS BY RX/DR IN RCRD: CPT | Mod: CPTII,,, | Performed by: SPECIALIST

## 2024-05-29 PROCEDURE — 73502 X-RAY EXAM HIP UNI 2-3 VIEWS: CPT | Mod: RT,,, | Performed by: SPECIALIST

## 2024-05-29 PROCEDURE — 1159F MED LIST DOCD IN RCRD: CPT | Mod: CPTII,,, | Performed by: SPECIALIST

## 2024-05-29 PROCEDURE — 99203 OFFICE O/P NEW LOW 30 MIN: CPT | Mod: ,,, | Performed by: SPECIALIST

## 2024-05-29 NOTE — PROGRESS NOTES
"  Chief Complaint:   Chief Complaint   Patient presents with    Right Hip - Pain     Pt has hx of Lt RADHA.   Pt states she has been experiencing a sensation that the bones are "rubbing" in the buttock and groin. Pain onset is 7 months approximately. Pt states the pain is constant and the hip bone "catches" at times when she is walking. Pt applies ice/heat and Voltaren gel with temporary relief.          History of present illness:    83-year-old female presents office today for evaluation for right hip pain.  Her pain has been present for many months but has worsened over the last month.  She has pain that has located in the groin that is worse with weight-bearing and activity.  Her pain is constant.  She notes decreased range of motion which has decreased activities of daily living.  She has a history of left total hip arthroplasty many years ago.  Her symptoms feel similar.    Past Medical History:   Diagnosis Date    Diabetes mellitus     Gout, unspecified     Hypertension        Past Surgical History:   Procedure Laterality Date    HIP REPLACEMENT ARTHROPLASTY Left     HYSTERECTOMY      Right hand surgery         Current Outpatient Medications   Medication Sig    acetaminophen (TYLENOL) 650 MG TbSR Take 650 mg by mouth every 8 (eight) hours as needed.    allopurinoL (ZYLOPRIM) 100 MG tablet Take 1 tablet by mouth once daily.    diclofenac sodium (VOLTAREN) 1 % Gel Apply 2 g topically 4 (four) times daily.    folic acid (FOLVITE) 1 MG tablet Take 1 tablet (1 mg total) by mouth once daily.    irbesartan (AVAPRO) 300 MG tablet Take 300 mg by mouth every evening.    levothyroxine (SYNTHROID) 50 MCG tablet Take 50 mcg by mouth before breakfast.    metFORMIN (GLUCOPHAGE) 500 MG tablet Take 500 mg by mouth once daily. WITH DINNER    methotrexate 2.5 MG Tab Take 6 tablets qweek. Ok to split dose and take 3 tab in am and 3 tab in pm. Hold for infection or fever.    ondansetron (ZOFRAN-ODT) 8 MG TbDL Take 1 tablet (8 mg " "total) by mouth every 8 (eight) hours as needed (nausea).    PROLIA 60 mg/mL Syrg INJECT 1ML SUBCUTANEOUSLY ONCE FOR 1 DOSE    amLODIPine (NORVASC) 10 MG tablet Take 10 mg by mouth once daily.    omeprazole (PRILOSEC) 40 MG capsule Take 40 mg by mouth.    pravastatin (PRAVACHOL) 10 MG tablet Take 10 mg by mouth every evening.     No current facility-administered medications for this visit.       Review of patient's allergies indicates:   Allergen Reactions    Penicillins      Other reaction(s): SOB, SWELLING AT INJ. SITE       Family History   Problem Relation Name Age of Onset    Diabetes Mellitus Mother      Cancer Sister         Social History     Socioeconomic History    Marital status:    Tobacco Use    Smoking status: Former     Current packs/day: 0.00     Types: Cigarettes     Quit date:      Years since quittin.     Passive exposure: Never    Smokeless tobacco: Never   Substance and Sexual Activity    Alcohol use: Not Currently     Comment: socially    Drug use: Yes     Types: Marijuana     Comment: Medical marijuana    Sexual activity: Not Currently     Partners: Male         Review of Systems:    Constitution:   Denies chills, fever, and sweats.  HENT:   Denies headaches or blurry vision.  Cardiovascular:  Denies chest pain or irregular heart beat.  Respiratory:   Denies cough or shortness of breath.  Gastrointestinal:  Denies abdominal pain, nausea, or vomiting.  Musculoskeletal:   Denies muscle cramps.  Neurological:   Denies dizziness or focal weakness.  Psychiatric/Behavior: Normal mental status.  Hematology/Lymph:  Denies bleeding problem or easy bruising/bleeding.  Skin:    Denies rash or suspicious lesions.    Examination:    Vital Signs:    Vitals:    24 0913   BP: (!) 163/83   Pulse: 70   Weight: 75.3 kg (166 lb)   Height: 5' 2" (1.575 m)       Body mass index is 30.36 kg/m².    Constitution:   Well-developed, well nourished patient in no acute distress.  Neurological: "   Alert and oriented x 3 and cooperative to examination.     Psychiatric/Behavior: Normal mental status.  Respiratory:   No shortness of breath.  Nonlabored breathing  Cardiovascular:           Regular rate and rhythm  Eyes:    Extraoccular muscles intact  Skin:    No scars, rash or suspicious lesions.    Physical Exam:     right hip exam     No signs of edema, erythema, or induration.    Tender over the greater trochanteric region.    Pain with internal and external rotation    Passive hip abduction 30 degrees   Passive hip flexion 90 degrees   Passive internal rotation 5 degrees   Passive external rotation 45 degrees   No weakness of the left hip was observed. An antalgic gait was observed. limping was noted     xrays    right hip x-ray with two or more views of the AP and lateral aspects were performed.   Impressions   Advanced Joint space narrowing, bone on bone with osteophytes arising from the hip joint and subchondral cysts seen        Assessment:     Primary osteoarthritis of right hip  -     X-Ray Hip 2 or 3 views Right with Pelvis when performed; Future; Expected date: 05/29/2024  -     Ambulatory referral/consult to Orthopedics    Diabetes mellitus without complication    Other orders  -     Cancel: Prior authorization Order  -     Cancel: Ambulatory referral/consult to Physical/Occupational Therapy; Future; Expected date: 06/05/2024        Plan:      Robotic assisted right total hip arthroplasty.  She has not interested in conservative treatment.  She has a history of diabetes but this is controlled.    The proposed procedure as well as associated risks/benefits were discussed at length with the patient and family with risks to include pain, bleeding, infection, future surgeries, neurovascular compromise, loss of limb, heart attack, stroke, deep vein thrombosis, and even death. They understand and agree with the treatment plan.        No follow-ups on file.    DISCLAIMER: This note may have been dictated  using voice recognition software and may contain grammatical errors.

## 2024-06-24 ENCOUNTER — PATIENT MESSAGE (OUTPATIENT)
Dept: ORTHOPEDICS | Facility: CLINIC | Age: 84
End: 2024-06-24
Payer: MEDICARE

## 2024-06-27 ENCOUNTER — PATIENT MESSAGE (OUTPATIENT)
Dept: ORTHOPEDICS | Facility: CLINIC | Age: 84
End: 2024-06-27
Payer: MEDICARE

## 2024-07-05 ENCOUNTER — OFFICE VISIT (OUTPATIENT)
Dept: ORTHOPEDICS | Facility: CLINIC | Age: 84
End: 2024-07-05
Payer: MEDICARE

## 2024-07-05 ENCOUNTER — HOSPITAL ENCOUNTER (OUTPATIENT)
Dept: RADIOLOGY | Facility: HOSPITAL | Age: 84
Discharge: HOME | End: 2024-07-05
Attending: PHYSICIAN ASSISTANT
Payer: MEDICARE

## 2024-07-05 VITALS
DIASTOLIC BLOOD PRESSURE: 88 MMHG | WEIGHT: 167 LBS | SYSTOLIC BLOOD PRESSURE: 165 MMHG | HEIGHT: 62 IN | HEART RATE: 72 BPM | BODY MASS INDEX: 30.73 KG/M2

## 2024-07-05 DIAGNOSIS — Z01.812 PRE-OPERATIVE LABORATORY EXAMINATION: ICD-10-CM

## 2024-07-05 DIAGNOSIS — M16.11 PRIMARY OSTEOARTHRITIS OF RIGHT HIP: ICD-10-CM

## 2024-07-05 DIAGNOSIS — I10 HYPERTENSION, UNSPECIFIED TYPE: ICD-10-CM

## 2024-07-05 DIAGNOSIS — R26.89 IMPAIRED GAIT AND MOBILITY: ICD-10-CM

## 2024-07-05 DIAGNOSIS — M16.11 PRIMARY OSTEOARTHRITIS OF RIGHT HIP: Primary | ICD-10-CM

## 2024-07-05 DIAGNOSIS — E11.9 DIABETES MELLITUS WITHOUT COMPLICATION: ICD-10-CM

## 2024-07-05 DIAGNOSIS — M19.90 OSTEOARTHRITIS: ICD-10-CM

## 2024-07-05 LAB — MRSA PCR SCRN (OHS): NOT DETECTED

## 2024-07-05 PROCEDURE — 73700 CT LOWER EXTREMITY W/O DYE: CPT | Mod: TC,RT

## 2024-07-05 PROCEDURE — 71046 X-RAY EXAM CHEST 2 VIEWS: CPT | Mod: TC

## 2024-07-05 RX ORDER — KETOROLAC TROMETHAMINE 10 MG/1
10 TABLET, FILM COATED ORAL
OUTPATIENT
Start: 2024-07-05 | End: 2024-07-05

## 2024-07-05 RX ORDER — SODIUM CHLORIDE, SODIUM GLUCONATE, SODIUM ACETATE, POTASSIUM CHLORIDE AND MAGNESIUM CHLORIDE 30; 37; 368; 526; 502 MG/100ML; MG/100ML; MG/100ML; MG/100ML; MG/100ML
INJECTION, SOLUTION INTRAVENOUS CONTINUOUS
OUTPATIENT
Start: 2024-07-05

## 2024-07-05 RX ORDER — SCOLOPAMINE TRANSDERMAL SYSTEM 1 MG/1
1 PATCH, EXTENDED RELEASE TRANSDERMAL ONCE AS NEEDED
OUTPATIENT
Start: 2024-07-05 | End: 2035-12-01

## 2024-07-05 RX ORDER — GABAPENTIN 100 MG/1
300 CAPSULE ORAL
OUTPATIENT
Start: 2024-07-05

## 2024-07-05 RX ORDER — ACETAMINOPHEN 500 MG
1000 TABLET ORAL
OUTPATIENT
Start: 2024-07-05

## 2024-07-05 RX ORDER — ONDANSETRON 4 MG/1
4 TABLET, ORALLY DISINTEGRATING ORAL
OUTPATIENT
Start: 2024-07-05

## 2024-07-05 RX ORDER — METRONIDAZOLE 500 MG/1
500 TABLET ORAL 3 TIMES DAILY
COMMUNITY
Start: 2024-07-04

## 2024-07-05 RX ORDER — CIPROFLOXACIN 250 MG/1
250 TABLET, FILM COATED ORAL 2 TIMES DAILY
COMMUNITY
Start: 2024-07-04

## 2024-07-05 RX ORDER — TRANEXAMIC ACID 650 MG/1
1950 TABLET ORAL
OUTPATIENT
Start: 2024-07-05 | End: 2024-07-05

## 2024-07-05 NOTE — H&P (VIEW-ONLY)
Chief Complaint:   Chief Complaint   Patient presents with    Right Hip - Pre-op Exam     Pt presents for pre-op exam for sx on 07/29/24--Rt RADHA ESTEBAN.       History of present illness:    83-year-old female presents office today for preop evaluation for robotic assisted right total hip arthroplasty on July 29th.  No new complaints or concerns today..  Her pain has been present for many months but has worsened over the last month.  She has pain that has located in the groin that is worse with weight-bearing and activity.  Her pain is constant.  She notes decreased range of motion which has decreased activities of daily living.  She has a history of left total hip arthroplasty many years ago.  Her symptoms feel similar.    Past Medical History:   Diagnosis Date    Diabetes mellitus     Gout, unspecified     Hypertension        Past Surgical History:   Procedure Laterality Date    HIP REPLACEMENT ARTHROPLASTY Left     HYSTERECTOMY      Right hand surgery         Current Outpatient Medications   Medication Sig    acetaminophen (TYLENOL) 650 MG TbSR Take 650 mg by mouth every 8 (eight) hours as needed.    allopurinoL (ZYLOPRIM) 100 MG tablet Take 1 tablet by mouth once daily.    ciprofloxacin HCl (CIPRO) 250 MG tablet Take 250 mg by mouth 2 (two) times daily.    diclofenac sodium (VOLTAREN) 1 % Gel Apply 2 g topically 4 (four) times daily.    folic acid (FOLVITE) 1 MG tablet Take 1 tablet (1 mg total) by mouth once daily.    irbesartan (AVAPRO) 300 MG tablet Take 300 mg by mouth every evening.    levothyroxine (SYNTHROID) 50 MCG tablet Take 50 mcg by mouth before breakfast.    metFORMIN (GLUCOPHAGE) 500 MG tablet Take 500 mg by mouth once daily. WITH DINNER    methotrexate 2.5 MG Tab Take 6 tablets qweek. Ok to split dose and take 3 tab in am and 3 tab in pm. Hold for infection or fever.    metroNIDAZOLE (FLAGYL) 500 MG tablet Take 500 mg by mouth 3 (three) times daily.    ondansetron (ZOFRAN-ODT) 8 MG TbDL Take 1  "tablet (8 mg total) by mouth every 8 (eight) hours as needed (nausea).    PROLIA 60 mg/mL Syrg INJECT 1ML SUBCUTANEOUSLY ONCE FOR 1 DOSE    amLODIPine (NORVASC) 10 MG tablet Take 10 mg by mouth once daily.    omeprazole (PRILOSEC) 40 MG capsule Take 40 mg by mouth.    pravastatin (PRAVACHOL) 10 MG tablet Take 10 mg by mouth every evening.     No current facility-administered medications for this visit.       Review of patient's allergies indicates:   Allergen Reactions    Penicillins      Other reaction(s): SOB, SWELLING AT INJ. SITE       Family History   Problem Relation Name Age of Onset    Diabetes Mellitus Mother      Cancer Sister         Social History     Socioeconomic History    Marital status:    Tobacco Use    Smoking status: Former     Current packs/day: 0.00     Types: Cigarettes     Quit date:      Years since quittin.     Passive exposure: Never    Smokeless tobacco: Never   Substance and Sexual Activity    Alcohol use: Not Currently     Comment: socially    Drug use: Yes     Types: Marijuana     Comment: Medical marijuana    Sexual activity: Not Currently     Partners: Male         Review of Systems:    Constitution:   Denies chills, fever, and sweats.  HENT:   Denies headaches or blurry vision.  Cardiovascular:  Denies chest pain or irregular heart beat.  Respiratory:   Denies cough or shortness of breath.  Gastrointestinal:  Denies abdominal pain, nausea, or vomiting.  Musculoskeletal:   Denies muscle cramps.  Neurological:   Denies dizziness or focal weakness.  Psychiatric/Behavior: Normal mental status.  Hematology/Lymph:  Denies bleeding problem or easy bruising/bleeding.  Skin:    Denies rash or suspicious lesions.    Examination:    Vital Signs:    Vitals:    24 0917   BP: (!) 165/88   Pulse: 72   Weight: 75.8 kg (167 lb)   Height: 5' 2" (1.575 m)       Body mass index is 30.54 kg/m².    Constitution:   Well-developed, well nourished patient in no acute " distress.  Neurological:   Alert and oriented x 3 and cooperative to examination.     Psychiatric/Behavior: Normal mental status.  Respiratory:   No shortness of breath.  Nonlabored breathing  Cardiovascular:           Regular rate and rhythm  Eyes:    Extraoccular muscles intact  Skin:    No scars, rash or suspicious lesions.    Physical Exam:     right hip exam     No signs of edema, erythema, or induration.    Tender over the greater trochanteric region.    Pain with internal and external rotation    Passive hip abduction 30 degrees   Passive hip flexion 90 degrees   Passive internal rotation 5 degrees   Passive external rotation 45 degrees   No weakness of the left hip was observed. An antalgic gait was observed. limping was noted     xrays    right hip x-ray with two or more views of the AP and lateral aspects were reviewed   Impressions   Advanced Joint space narrowing, bone on bone with osteophytes arising from the hip joint and subchondral cysts seen        Assessment:     Primary osteoarthritis of right hip    Diabetes mellitus without complication    Hypertension, unspecified type        Plan:      Robotic assisted right total hip arthroplasty.   We will use aspirin postoperatively for DVT prophylaxis    Considering the patient's history of diabetes, the patient is at higher risk for uncontrolled blood sugars. We will admit as inpatient and expect the patient to stay two midnight's in the hospital to monitor CBG'S AC/HS with an insulin sliding scale and monitored dietary intake and maintain tight glycemic control. We will discharge once the patient is stable and blood sugars are well controlled     The proposed procedure as well as associated risks/benefits were discussed at length with the patient and family with risks to include pain, bleeding, infection, future surgeries, neurovascular compromise, loss of limb, heart attack, stroke, deep vein thrombosis, and even death. They understand and agree with  the treatment plan.        No follow-ups on file.    DISCLAIMER: This note may have been dictated using voice recognition software and may contain grammatical errors.

## 2024-07-09 RX ORDER — PANTOPRAZOLE SODIUM 20 MG/1
20 TABLET, DELAYED RELEASE ORAL DAILY
COMMUNITY

## 2024-07-10 ENCOUNTER — ANESTHESIA EVENT (OUTPATIENT)
Dept: SURGERY | Facility: HOSPITAL | Age: 84
End: 2024-07-10
Payer: MEDICARE

## 2024-07-10 NOTE — CLINICAL REVIEW
labs/EKG/CXR reviewed. cardiology notes utd. Echo/Stress noted. CRA provided. chart review complete. ccv

## 2024-07-16 ENCOUNTER — PATIENT MESSAGE (OUTPATIENT)
Dept: ORTHOPEDICS | Facility: CLINIC | Age: 84
End: 2024-07-16
Payer: MEDICARE

## 2024-07-16 ENCOUNTER — TELEPHONE (OUTPATIENT)
Dept: ORTHOPEDICS | Facility: CLINIC | Age: 84
End: 2024-07-16
Payer: MEDICARE

## 2024-07-16 NOTE — TELEPHONE ENCOUNTER
----- Message from SUSAN Ramirez sent at 7/16/2024 10:43 AM CDT -----  Aspirin is to be held one-week prior to surgery.  We will start her on 2 baby aspirins (81mg) after surgery for 6 weeks total to help lower chance of blood clot  ----- Message -----  From: Giovanna Bautista CMA  Sent: 7/16/2024  10:33 AM CDT  To: SUSAN Ramirez    Patient called and stated she came in for pre op on 7/5/24 and you told her to start taking an Asprin before sx scheduled on 7/23/24.     She is wanting to know when she was supposed to start taking the Asprin she does not remember what you told her?      Thanks!

## 2024-07-22 ENCOUNTER — PATIENT MESSAGE (OUTPATIENT)
Dept: ADMINISTRATIVE | Facility: OTHER | Age: 84
End: 2024-07-22
Payer: MEDICARE

## 2024-07-23 ENCOUNTER — ANESTHESIA (OUTPATIENT)
Dept: SURGERY | Facility: HOSPITAL | Age: 84
End: 2024-07-23
Payer: MEDICARE

## 2024-07-23 ENCOUNTER — HOSPITAL ENCOUNTER (OUTPATIENT)
Facility: HOSPITAL | Age: 84
Discharge: HOME | End: 2024-07-25
Attending: SPECIALIST | Admitting: SPECIALIST
Payer: MEDICARE

## 2024-07-23 DIAGNOSIS — M16.9 OA (OSTEOARTHRITIS) OF HIP: ICD-10-CM

## 2024-07-23 DIAGNOSIS — Z01.812 PRE-OPERATIVE LABORATORY EXAMINATION: ICD-10-CM

## 2024-07-23 DIAGNOSIS — M16.11 PRIMARY OSTEOARTHRITIS OF RIGHT HIP: ICD-10-CM

## 2024-07-23 DIAGNOSIS — I10 HYPERTENSION, UNSPECIFIED TYPE: ICD-10-CM

## 2024-07-23 DIAGNOSIS — R26.89 IMPAIRED GAIT AND MOBILITY: ICD-10-CM

## 2024-07-23 DIAGNOSIS — E11.9 DIABETES MELLITUS WITHOUT COMPLICATION: ICD-10-CM

## 2024-07-23 PROBLEM — E78.5 HYPERLIPIDEMIA ASSOCIATED WITH TYPE 2 DIABETES MELLITUS: Status: ACTIVE | Noted: 2021-08-05

## 2024-07-23 PROBLEM — Z99.89 DEPENDENCE ON CANE: Status: ACTIVE | Noted: 2023-05-17

## 2024-07-23 PROBLEM — I70.0 AORTIC ATHEROSCLEROSIS: Status: ACTIVE | Noted: 2018-06-05

## 2024-07-23 PROBLEM — E78.2 MIXED DIABETIC HYPERLIPIDEMIA ASSOCIATED WITH TYPE 2 DIABETES MELLITUS: Status: ACTIVE | Noted: 2023-12-28

## 2024-07-23 PROBLEM — M81.0 AGE-RELATED OSTEOPOROSIS WITHOUT CURRENT PATHOLOGICAL FRACTURE: Status: ACTIVE | Noted: 2018-06-11

## 2024-07-23 PROBLEM — E66.9 OBESITY: Status: ACTIVE | Noted: 2024-07-23

## 2024-07-23 PROBLEM — R54 FRAILTY SYNDROME IN GERIATRIC PATIENT: Status: ACTIVE | Noted: 2023-05-17

## 2024-07-23 PROBLEM — E11.69 MIXED DIABETIC HYPERLIPIDEMIA ASSOCIATED WITH TYPE 2 DIABETES MELLITUS: Status: ACTIVE | Noted: 2023-12-28

## 2024-07-23 PROBLEM — I25.10 ATHEROSCLEROSIS OF NATIVE CORONARY ARTERY: Status: ACTIVE | Noted: 2023-12-28

## 2024-07-23 PROBLEM — E11.69 HYPERLIPIDEMIA ASSOCIATED WITH TYPE 2 DIABETES MELLITUS: Status: ACTIVE | Noted: 2021-08-05

## 2024-07-23 PROBLEM — K21.9 GASTROESOPHAGEAL REFLUX DISEASE: Status: ACTIVE | Noted: 2023-12-28

## 2024-07-23 LAB
HCT VFR BLD AUTO: 33.9 % (ref 37–47)
HGB BLD-MCNC: 11.3 G/DL (ref 12–16)
POCT GLUCOSE: 133 MG/DL (ref 70–110)
POCT GLUCOSE: 150 MG/DL (ref 70–110)
POCT GLUCOSE: 208 MG/DL (ref 70–110)
POCT GLUCOSE: 209 MG/DL (ref 70–110)

## 2024-07-23 PROCEDURE — 85018 HEMOGLOBIN: CPT | Performed by: SPECIALIST

## 2024-07-23 PROCEDURE — C1713 ANCHOR/SCREW BN/BN,TIS/BN: HCPCS | Performed by: SPECIALIST

## 2024-07-23 PROCEDURE — 99900035 HC TECH TIME PER 15 MIN (STAT)

## 2024-07-23 PROCEDURE — 51798 US URINE CAPACITY MEASURE: CPT | Performed by: SPECIALIST

## 2024-07-23 PROCEDURE — 94761 N-INVAS EAR/PLS OXIMETRY MLT: CPT

## 2024-07-23 PROCEDURE — G0378 HOSPITAL OBSERVATION PER HR: HCPCS

## 2024-07-23 PROCEDURE — 96361 HYDRATE IV INFUSION ADD-ON: CPT

## 2024-07-23 PROCEDURE — A4216 STERILE WATER/SALINE, 10 ML: HCPCS | Performed by: SPECIALIST

## 2024-07-23 PROCEDURE — 36000712 HC OR TIME LEV V 1ST 15 MIN: Performed by: SPECIALIST

## 2024-07-23 PROCEDURE — 25000003 PHARM REV CODE 250: Performed by: NURSE ANESTHETIST, CERTIFIED REGISTERED

## 2024-07-23 PROCEDURE — 51798 US URINE CAPACITY MEASURE: CPT

## 2024-07-23 PROCEDURE — 96375 TX/PRO/DX INJ NEW DRUG ADDON: CPT

## 2024-07-23 PROCEDURE — 27000221 HC OXYGEN, UP TO 24 HOURS

## 2024-07-23 PROCEDURE — 96372 THER/PROPH/DIAG INJ SC/IM: CPT | Performed by: INTERNAL MEDICINE

## 2024-07-23 PROCEDURE — C1776 JOINT DEVICE (IMPLANTABLE): HCPCS | Performed by: SPECIALIST

## 2024-07-23 PROCEDURE — 0055T BONE SRGRY CMPTR CT/MRI IMAG: CPT | Mod: ,,, | Performed by: SPECIALIST

## 2024-07-23 PROCEDURE — 36000713 HC OR TIME LEV V EA ADD 15 MIN: Performed by: SPECIALIST

## 2024-07-23 PROCEDURE — 25000003 PHARM REV CODE 250: Performed by: SPECIALIST

## 2024-07-23 PROCEDURE — 63600175 PHARM REV CODE 636 W HCPCS: Performed by: SPECIALIST

## 2024-07-23 PROCEDURE — 94799 UNLISTED PULMONARY SVC/PX: CPT

## 2024-07-23 PROCEDURE — 99900031 HC PATIENT EDUCATION (STAT)

## 2024-07-23 PROCEDURE — 36415 COLL VENOUS BLD VENIPUNCTURE: CPT | Performed by: SPECIALIST

## 2024-07-23 PROCEDURE — 27130 TOTAL HIP ARTHROPLASTY: CPT | Mod: RT,,, | Performed by: SPECIALIST

## 2024-07-23 PROCEDURE — 88304 TISSUE EXAM BY PATHOLOGIST: CPT | Performed by: SPECIALIST

## 2024-07-23 PROCEDURE — 82962 GLUCOSE BLOOD TEST: CPT | Performed by: SPECIALIST

## 2024-07-23 PROCEDURE — 37000009 HC ANESTHESIA EA ADD 15 MINS: Performed by: SPECIALIST

## 2024-07-23 PROCEDURE — 25000003 PHARM REV CODE 250: Performed by: PHYSICIAN ASSISTANT

## 2024-07-23 PROCEDURE — 37000008 HC ANESTHESIA 1ST 15 MINUTES: Performed by: SPECIALIST

## 2024-07-23 PROCEDURE — 27130 TOTAL HIP ARTHROPLASTY: CPT | Mod: AS,RT,, | Performed by: PHYSICIAN ASSISTANT

## 2024-07-23 PROCEDURE — 71000033 HC RECOVERY, INTIAL HOUR: Performed by: SPECIALIST

## 2024-07-23 PROCEDURE — 96366 THER/PROPH/DIAG IV INF ADDON: CPT | Mod: 59

## 2024-07-23 PROCEDURE — 85014 HEMATOCRIT: CPT | Performed by: SPECIALIST

## 2024-07-23 PROCEDURE — 63600175 PHARM REV CODE 636 W HCPCS: Performed by: NURSE ANESTHETIST, CERTIFIED REGISTERED

## 2024-07-23 PROCEDURE — 96365 THER/PROPH/DIAG IV INF INIT: CPT | Mod: 59

## 2024-07-23 PROCEDURE — 27201423 OPTIME MED/SURG SUP & DEVICES STERILE SUPPLY: Performed by: SPECIALIST

## 2024-07-23 PROCEDURE — 63600175 PHARM REV CODE 636 W HCPCS: Performed by: ANESTHESIOLOGY

## 2024-07-23 PROCEDURE — 88311 DECALCIFY TISSUE: CPT

## 2024-07-23 PROCEDURE — 63600175 PHARM REV CODE 636 W HCPCS: Performed by: INTERNAL MEDICINE

## 2024-07-23 PROCEDURE — 97162 PT EVAL MOD COMPLEX 30 MIN: CPT

## 2024-07-23 PROCEDURE — 63600175 PHARM REV CODE 636 W HCPCS: Mod: JZ,JG | Performed by: SPECIALIST

## 2024-07-23 PROCEDURE — C1889 IMPLANT/INSERT DEVICE, NOC: HCPCS | Performed by: SPECIALIST

## 2024-07-23 DEVICE — UNIVERSAL CEMENT RESTRICTOR
Type: IMPLANTABLE DEVICE | Site: HIP | Status: FUNCTIONAL
Brand: RESTRICTOR

## 2024-07-23 DEVICE — CEMENT BONE ANTIBIO SIMPLEX P: Type: IMPLANTABLE DEVICE | Site: HIP | Status: FUNCTIONAL

## 2024-07-23 DEVICE — TRIDENT II TRITANIUM CLUSTER 46C
Type: IMPLANTABLE DEVICE | Site: HIP | Status: FUNCTIONAL
Brand: TRIDENT II

## 2024-07-23 DEVICE — UNIVERSAL DISTAL CEMENT SPACER
Type: IMPLANTABLE DEVICE | Site: HIP | Status: FUNCTIONAL
Brand: OMNIFIT

## 2024-07-23 DEVICE — 127 DEGREE CEMENTED HIP STEM
Type: IMPLANTABLE DEVICE | Site: HIP | Status: FUNCTIONAL
Brand: OMNIFIT

## 2024-07-23 DEVICE — LOW FRICTION ION TREATMENT
Type: IMPLANTABLE DEVICE | Site: HIP | Status: FUNCTIONAL
Brand: C-TAPER HEAD

## 2024-07-23 RX ORDER — KETOROLAC TROMETHAMINE 30 MG/ML
INJECTION, SOLUTION INTRAMUSCULAR; INTRAVENOUS
Status: DISCONTINUED | OUTPATIENT
Start: 2024-07-23 | End: 2024-07-23 | Stop reason: HOSPADM

## 2024-07-23 RX ORDER — HYDROCODONE BITARTRATE AND ACETAMINOPHEN 5; 325 MG/1; MG/1
1 TABLET ORAL EVERY 8 HOURS PRN
Status: DISCONTINUED | OUTPATIENT
Start: 2024-07-23 | End: 2024-07-25 | Stop reason: HOSPADM

## 2024-07-23 RX ORDER — SCOLOPAMINE TRANSDERMAL SYSTEM 1 MG/1
1 PATCH, EXTENDED RELEASE TRANSDERMAL ONCE AS NEEDED
Status: DISCONTINUED | OUTPATIENT
Start: 2024-07-23 | End: 2024-07-23 | Stop reason: HOSPADM

## 2024-07-23 RX ORDER — KETOROLAC TROMETHAMINE 30 MG/ML
15 INJECTION, SOLUTION INTRAMUSCULAR; INTRAVENOUS EVERY 6 HOURS
Status: DISCONTINUED | OUTPATIENT
Start: 2024-07-23 | End: 2024-07-23

## 2024-07-23 RX ORDER — METOCLOPRAMIDE HYDROCHLORIDE 5 MG/ML
10 INJECTION INTRAMUSCULAR; INTRAVENOUS
Status: DISCONTINUED | OUTPATIENT
Start: 2024-07-23 | End: 2024-07-23

## 2024-07-23 RX ORDER — LOSARTAN POTASSIUM 50 MG/1
100 TABLET ORAL DAILY
Status: DISCONTINUED | OUTPATIENT
Start: 2024-07-24 | End: 2024-07-25 | Stop reason: HOSPADM

## 2024-07-23 RX ORDER — TRAMADOL HYDROCHLORIDE 50 MG/1
50 TABLET ORAL EVERY 4 HOURS PRN
Status: DISCONTINUED | OUTPATIENT
Start: 2024-07-23 | End: 2024-07-25 | Stop reason: HOSPADM

## 2024-07-23 RX ORDER — BISACODYL 10 MG/1
10 SUPPOSITORY RECTAL DAILY
Status: DISCONTINUED | OUTPATIENT
Start: 2024-07-26 | End: 2024-07-25 | Stop reason: HOSPADM

## 2024-07-23 RX ORDER — DEXAMETHASONE SODIUM PHOSPHATE 4 MG/ML
INJECTION, SOLUTION INTRA-ARTICULAR; INTRALESIONAL; INTRAMUSCULAR; INTRAVENOUS; SOFT TISSUE
Status: DISCONTINUED | OUTPATIENT
Start: 2024-07-23 | End: 2024-07-23

## 2024-07-23 RX ORDER — IBUPROFEN 200 MG
16 TABLET ORAL
Status: DISCONTINUED | OUTPATIENT
Start: 2024-07-23 | End: 2024-07-25 | Stop reason: HOSPADM

## 2024-07-23 RX ORDER — AMOXICILLIN 250 MG
2 CAPSULE ORAL 2 TIMES DAILY
Status: DISCONTINUED | OUTPATIENT
Start: 2024-07-23 | End: 2024-07-25 | Stop reason: HOSPADM

## 2024-07-23 RX ORDER — METFORMIN HYDROCHLORIDE 500 MG/1
500 TABLET ORAL DAILY
Status: DISCONTINUED | OUTPATIENT
Start: 2024-07-23 | End: 2024-07-23

## 2024-07-23 RX ORDER — ACETAMINOPHEN 10 MG/ML
1000 INJECTION, SOLUTION INTRAVENOUS ONCE
Status: COMPLETED | OUTPATIENT
Start: 2024-07-23 | End: 2024-07-23

## 2024-07-23 RX ORDER — GLYCOPYRROLATE 0.2 MG/ML
INJECTION INTRAMUSCULAR; INTRAVENOUS
Status: DISCONTINUED | OUTPATIENT
Start: 2024-07-23 | End: 2024-07-23

## 2024-07-23 RX ORDER — EPHEDRINE SULFATE 50 MG/ML
INJECTION, SOLUTION INTRAVENOUS
Status: DISCONTINUED | OUTPATIENT
Start: 2024-07-23 | End: 2024-07-23

## 2024-07-23 RX ORDER — GABAPENTIN 300 MG/1
300 CAPSULE ORAL NIGHTLY
Status: DISCONTINUED | OUTPATIENT
Start: 2024-07-23 | End: 2024-07-23

## 2024-07-23 RX ORDER — METHOCARBAMOL 500 MG/1
500 TABLET, FILM COATED ORAL EVERY 8 HOURS PRN
Status: DISCONTINUED | OUTPATIENT
Start: 2024-07-23 | End: 2024-07-25 | Stop reason: HOSPADM

## 2024-07-23 RX ORDER — EPINEPHRINE 1 MG/ML
INJECTION, SOLUTION, CONCENTRATE INTRAVENOUS
Status: DISPENSED
Start: 2024-07-23 | End: 2024-07-24

## 2024-07-23 RX ORDER — ROCURONIUM BROMIDE 10 MG/ML
INJECTION, SOLUTION INTRAVENOUS
Status: DISCONTINUED | OUTPATIENT
Start: 2024-07-23 | End: 2024-07-23

## 2024-07-23 RX ORDER — SODIUM CHLORIDE 0.9 % (FLUSH) 0.9 %
SYRINGE (ML) INJECTION
Status: DISPENSED
Start: 2024-07-23 | End: 2024-07-24

## 2024-07-23 RX ORDER — ACETAMINOPHEN 500 MG
500 TABLET ORAL EVERY 4 HOURS
Status: DISCONTINUED | OUTPATIENT
Start: 2024-07-23 | End: 2024-07-25 | Stop reason: HOSPADM

## 2024-07-23 RX ORDER — MORPHINE SULFATE 4 MG/ML
2 INJECTION, SOLUTION INTRAMUSCULAR; INTRAVENOUS
Status: ACTIVE | OUTPATIENT
Start: 2024-07-23 | End: 2024-07-24

## 2024-07-23 RX ORDER — ONDANSETRON HYDROCHLORIDE 2 MG/ML
4 INJECTION, SOLUTION INTRAVENOUS EVERY 6 HOURS PRN
Status: DISCONTINUED | OUTPATIENT
Start: 2024-07-23 | End: 2024-07-25 | Stop reason: HOSPADM

## 2024-07-23 RX ORDER — VANCOMYCIN HYDROCHLORIDE 1 G/20ML
INJECTION, POWDER, LYOPHILIZED, FOR SOLUTION INTRAVENOUS
Status: DISPENSED
Start: 2024-07-23 | End: 2024-07-23

## 2024-07-23 RX ORDER — PROPOFOL 10 MG/ML
VIAL (ML) INTRAVENOUS
Status: DISCONTINUED | OUTPATIENT
Start: 2024-07-23 | End: 2024-07-23

## 2024-07-23 RX ORDER — FENTANYL CITRATE 50 UG/ML
INJECTION, SOLUTION INTRAMUSCULAR; INTRAVENOUS
Status: DISCONTINUED | OUTPATIENT
Start: 2024-07-23 | End: 2024-07-23

## 2024-07-23 RX ORDER — CEFAZOLIN SODIUM 1 G/3ML
INJECTION, POWDER, FOR SOLUTION INTRAMUSCULAR; INTRAVENOUS
Status: DISCONTINUED | OUTPATIENT
Start: 2024-07-23 | End: 2024-07-23

## 2024-07-23 RX ORDER — ONDANSETRON 4 MG/1
4 TABLET, ORALLY DISINTEGRATING ORAL
Status: COMPLETED | OUTPATIENT
Start: 2024-07-23 | End: 2024-07-23

## 2024-07-23 RX ORDER — KETOROLAC TROMETHAMINE 30 MG/ML
INJECTION, SOLUTION INTRAMUSCULAR; INTRAVENOUS
Status: DISPENSED
Start: 2024-07-23 | End: 2024-07-23

## 2024-07-23 RX ORDER — MORPHINE SULFATE 4 MG/ML
4 INJECTION, SOLUTION INTRAMUSCULAR; INTRAVENOUS
Status: DISCONTINUED | OUTPATIENT
Start: 2024-07-23 | End: 2024-07-23

## 2024-07-23 RX ORDER — MORPHINE SULFATE 10 MG/ML
INJECTION INTRAMUSCULAR; INTRAVENOUS; SUBCUTANEOUS
Status: DISPENSED
Start: 2024-07-23 | End: 2024-07-23

## 2024-07-23 RX ORDER — HYDROCODONE BITARTRATE AND ACETAMINOPHEN 5; 325 MG/1; MG/1
1 TABLET ORAL EVERY 4 HOURS PRN
Status: DISCONTINUED | OUTPATIENT
Start: 2024-07-23 | End: 2024-07-23

## 2024-07-23 RX ORDER — ALUMINUM HYDROXIDE, MAGNESIUM HYDROXIDE, AND SIMETHICONE 1200; 120; 1200 MG/30ML; MG/30ML; MG/30ML
30 SUSPENSION ORAL EVERY 6 HOURS PRN
Status: DISCONTINUED | OUTPATIENT
Start: 2024-07-23 | End: 2024-07-25 | Stop reason: HOSPADM

## 2024-07-23 RX ORDER — KETOROLAC TROMETHAMINE 30 MG/ML
INJECTION, SOLUTION INTRAMUSCULAR; INTRAVENOUS
Status: DISPENSED
Start: 2024-07-23 | End: 2024-07-24

## 2024-07-23 RX ORDER — ALLOPURINOL 100 MG/1
100 TABLET ORAL DAILY
Status: DISCONTINUED | OUTPATIENT
Start: 2024-07-23 | End: 2024-07-25 | Stop reason: HOSPADM

## 2024-07-23 RX ORDER — ROPIVACAINE HYDROCHLORIDE 5 MG/ML
INJECTION, SOLUTION EPIDURAL; INFILTRATION; PERINEURAL
Status: DISCONTINUED | OUTPATIENT
Start: 2024-07-23 | End: 2024-07-23 | Stop reason: HOSPADM

## 2024-07-23 RX ORDER — METHOCARBAMOL 750 MG/1
750 TABLET, FILM COATED ORAL EVERY 8 HOURS PRN
Status: DISCONTINUED | OUTPATIENT
Start: 2024-07-23 | End: 2024-07-23

## 2024-07-23 RX ORDER — MIDAZOLAM HYDROCHLORIDE 1 MG/ML
INJECTION INTRAMUSCULAR; INTRAVENOUS
Status: DISCONTINUED | OUTPATIENT
Start: 2024-07-23 | End: 2024-07-23

## 2024-07-23 RX ORDER — SODIUM CHLORIDE 9 MG/ML
INJECTION, SOLUTION INTRAMUSCULAR; INTRAVENOUS; SUBCUTANEOUS
Status: DISCONTINUED | OUTPATIENT
Start: 2024-07-23 | End: 2024-07-23 | Stop reason: HOSPADM

## 2024-07-23 RX ORDER — ONDANSETRON HYDROCHLORIDE 2 MG/ML
INJECTION, SOLUTION INTRAVENOUS
Status: DISCONTINUED | OUTPATIENT
Start: 2024-07-23 | End: 2024-07-23

## 2024-07-23 RX ORDER — TRANEXAMIC ACID 650 MG/1
1950 TABLET ORAL
Status: COMPLETED | OUTPATIENT
Start: 2024-07-23 | End: 2024-07-23

## 2024-07-23 RX ORDER — ROPIVACAINE HYDROCHLORIDE 5 MG/ML
INJECTION, SOLUTION EPIDURAL; INFILTRATION; PERINEURAL
Status: DISPENSED
Start: 2024-07-23 | End: 2024-07-24

## 2024-07-23 RX ORDER — TRAMADOL HYDROCHLORIDE 50 MG/1
50 TABLET ORAL EVERY 4 HOURS PRN
Status: DISCONTINUED | OUTPATIENT
Start: 2024-07-23 | End: 2024-07-23

## 2024-07-23 RX ORDER — FAMOTIDINE 20 MG/1
20 TABLET, FILM COATED ORAL 2 TIMES DAILY
Status: DISCONTINUED | OUTPATIENT
Start: 2024-07-23 | End: 2024-07-24

## 2024-07-23 RX ORDER — VANCOMYCIN HYDROCHLORIDE 1 G/20ML
INJECTION, POWDER, LYOPHILIZED, FOR SOLUTION INTRAVENOUS
Status: DISCONTINUED | OUTPATIENT
Start: 2024-07-23 | End: 2024-07-23 | Stop reason: HOSPADM

## 2024-07-23 RX ORDER — IBUPROFEN 200 MG
24 TABLET ORAL
Status: DISCONTINUED | OUTPATIENT
Start: 2024-07-23 | End: 2024-07-25 | Stop reason: HOSPADM

## 2024-07-23 RX ORDER — DOCUSATE SODIUM 100 MG/1
200 CAPSULE, LIQUID FILLED ORAL DAILY
Status: DISCONTINUED | OUTPATIENT
Start: 2024-07-24 | End: 2024-07-25 | Stop reason: HOSPADM

## 2024-07-23 RX ORDER — LACTULOSE 10 G/15ML
20 SOLUTION ORAL EVERY 6 HOURS PRN
Status: DISCONTINUED | OUTPATIENT
Start: 2024-07-23 | End: 2024-07-25 | Stop reason: HOSPADM

## 2024-07-23 RX ORDER — METOCLOPRAMIDE 10 MG/1
10 TABLET ORAL EVERY 6 HOURS
Status: DISCONTINUED | OUTPATIENT
Start: 2024-07-23 | End: 2024-07-24

## 2024-07-23 RX ORDER — SODIUM CHLORIDE 9 MG/ML
INJECTION, SOLUTION INTRAVENOUS CONTINUOUS
Status: DISCONTINUED | OUTPATIENT
Start: 2024-07-23 | End: 2024-07-25 | Stop reason: HOSPADM

## 2024-07-23 RX ORDER — EPINEPHRINE 1 MG/ML
INJECTION, SOLUTION, CONCENTRATE INTRAVENOUS
Status: DISCONTINUED | OUTPATIENT
Start: 2024-07-23 | End: 2024-07-23 | Stop reason: HOSPADM

## 2024-07-23 RX ORDER — GABAPENTIN 300 MG/1
300 CAPSULE ORAL
Status: COMPLETED | OUTPATIENT
Start: 2024-07-23 | End: 2024-07-23

## 2024-07-23 RX ORDER — LOSARTAN POTASSIUM 50 MG/1
100 TABLET ORAL DAILY
Status: DISCONTINUED | OUTPATIENT
Start: 2024-07-23 | End: 2024-07-23

## 2024-07-23 RX ORDER — POLYETHYLENE GLYCOL 3350 17 G/17G
17 POWDER, FOR SOLUTION ORAL NIGHTLY
Status: DISCONTINUED | OUTPATIENT
Start: 2024-07-23 | End: 2024-07-25 | Stop reason: HOSPADM

## 2024-07-23 RX ORDER — METFORMIN HYDROCHLORIDE 500 MG/1
500 TABLET ORAL
Status: DISCONTINUED | OUTPATIENT
Start: 2024-07-24 | End: 2024-07-25 | Stop reason: HOSPADM

## 2024-07-23 RX ORDER — INSULIN ASPART 100 [IU]/ML
0-5 INJECTION, SOLUTION INTRAVENOUS; SUBCUTANEOUS
Status: DISCONTINUED | OUTPATIENT
Start: 2024-07-23 | End: 2024-07-25 | Stop reason: HOSPADM

## 2024-07-23 RX ORDER — HYDROMORPHONE HYDROCHLORIDE 2 MG/ML
0.4 INJECTION, SOLUTION INTRAMUSCULAR; INTRAVENOUS; SUBCUTANEOUS EVERY 5 MIN PRN
Status: DISCONTINUED | OUTPATIENT
Start: 2024-07-23 | End: 2024-07-23 | Stop reason: HOSPADM

## 2024-07-23 RX ORDER — LIDOCAINE HYDROCHLORIDE 20 MG/ML
INJECTION, SOLUTION EPIDURAL; INFILTRATION; INTRACAUDAL; PERINEURAL
Status: DISCONTINUED | OUTPATIENT
Start: 2024-07-23 | End: 2024-07-23

## 2024-07-23 RX ORDER — ROPIVACAINE HYDROCHLORIDE 5 MG/ML
INJECTION, SOLUTION EPIDURAL; INFILTRATION; PERINEURAL
Status: DISPENSED
Start: 2024-07-23 | End: 2024-07-23

## 2024-07-23 RX ORDER — FOLIC ACID 1 MG/1
1 TABLET ORAL DAILY
Status: DISCONTINUED | OUTPATIENT
Start: 2024-07-23 | End: 2024-07-25 | Stop reason: HOSPADM

## 2024-07-23 RX ORDER — TALC
6 POWDER (GRAM) TOPICAL NIGHTLY PRN
Status: DISCONTINUED | OUTPATIENT
Start: 2024-07-23 | End: 2024-07-25 | Stop reason: HOSPADM

## 2024-07-23 RX ORDER — MORPHINE SULFATE 10 MG/ML
INJECTION INTRAMUSCULAR; INTRAVENOUS; SUBCUTANEOUS
Status: DISCONTINUED | OUTPATIENT
Start: 2024-07-23 | End: 2024-07-23 | Stop reason: HOSPADM

## 2024-07-23 RX ORDER — SODIUM CHLORIDE 0.9 % (FLUSH) 0.9 %
SYRINGE (ML) INJECTION
Status: DISPENSED
Start: 2024-07-23 | End: 2024-07-23

## 2024-07-23 RX ORDER — GLUCAGON 1 MG
1 KIT INJECTION
Status: DISCONTINUED | OUTPATIENT
Start: 2024-07-23 | End: 2024-07-25 | Stop reason: HOSPADM

## 2024-07-23 RX ORDER — LEVOTHYROXINE SODIUM 50 UG/1
50 TABLET ORAL
Status: DISCONTINUED | OUTPATIENT
Start: 2024-07-24 | End: 2024-07-25 | Stop reason: HOSPADM

## 2024-07-23 RX ORDER — EPINEPHRINE 1 MG/ML
INJECTION, SOLUTION, CONCENTRATE INTRAVENOUS
Status: DISPENSED
Start: 2024-07-23 | End: 2024-07-23

## 2024-07-23 RX ORDER — ACETAMINOPHEN 500 MG
1000 TABLET ORAL
Status: COMPLETED | OUTPATIENT
Start: 2024-07-23 | End: 2024-07-23

## 2024-07-23 RX ORDER — NAPROXEN SODIUM 220 MG/1
81 TABLET, FILM COATED ORAL 2 TIMES DAILY
Status: DISCONTINUED | OUTPATIENT
Start: 2024-07-24 | End: 2024-07-25 | Stop reason: HOSPADM

## 2024-07-23 RX ORDER — MORPHINE SULFATE 10 MG/ML
INJECTION INTRAMUSCULAR; INTRAVENOUS; SUBCUTANEOUS
Status: DISCONTINUED
Start: 2024-07-23 | End: 2024-07-23 | Stop reason: WASHOUT

## 2024-07-23 RX ORDER — SODIUM CHLORIDE, SODIUM GLUCONATE, SODIUM ACETATE, POTASSIUM CHLORIDE AND MAGNESIUM CHLORIDE 30; 37; 368; 526; 502 MG/100ML; MG/100ML; MG/100ML; MG/100ML; MG/100ML
INJECTION, SOLUTION INTRAVENOUS CONTINUOUS
Status: DISCONTINUED | OUTPATIENT
Start: 2024-07-23 | End: 2024-07-23

## 2024-07-23 RX ADMIN — EPHEDRINE SULFATE 10 MG: 50 INJECTION INTRAVENOUS at 11:07

## 2024-07-23 RX ADMIN — GLYCOPYRROLATE 0.1 MG: 0.2 INJECTION INTRAMUSCULAR; INTRAVENOUS at 11:07

## 2024-07-23 RX ADMIN — ACETAMINOPHEN 1000 MG: 10 INJECTION INTRAVENOUS at 06:07

## 2024-07-23 RX ADMIN — DEXAMETHASONE SODIUM PHOSPHATE 4 MG: 4 INJECTION, SOLUTION INTRA-ARTICULAR; INTRALESIONAL; INTRAMUSCULAR; INTRAVENOUS; SOFT TISSUE at 11:07

## 2024-07-23 RX ADMIN — ONDANSETRON 4 MG: 4 TABLET, ORALLY DISINTEGRATING ORAL at 09:07

## 2024-07-23 RX ADMIN — SODIUM CHLORIDE, SODIUM GLUCONATE, SODIUM ACETATE, POTASSIUM CHLORIDE AND MAGNESIUM CHLORIDE: 526; 502; 368; 37; 30 INJECTION, SOLUTION INTRAVENOUS at 09:07

## 2024-07-23 RX ADMIN — LIDOCAINE HYDROCHLORIDE 50 MG: 20 INJECTION, SOLUTION EPIDURAL; INFILTRATION; INTRACAUDAL; PERINEURAL at 11:07

## 2024-07-23 RX ADMIN — CEFAZOLIN 2 G: 2 INJECTION, POWDER, FOR SOLUTION INTRAMUSCULAR; INTRAVENOUS at 05:07

## 2024-07-23 RX ADMIN — SODIUM CHLORIDE, SODIUM GLUCONATE, SODIUM ACETATE, POTASSIUM CHLORIDE AND MAGNESIUM CHLORIDE: 526; 502; 368; 37; 30 INJECTION, SOLUTION INTRAVENOUS at 11:07

## 2024-07-23 RX ADMIN — FENTANYL CITRATE 100 MCG: 50 INJECTION, SOLUTION INTRAMUSCULAR; INTRAVENOUS at 11:07

## 2024-07-23 RX ADMIN — ACETAMINOPHEN 500 MG: 500 TABLET ORAL at 10:07

## 2024-07-23 RX ADMIN — SUGAMMADEX 200 MG: 100 INJECTION, SOLUTION INTRAVENOUS at 01:07

## 2024-07-23 RX ADMIN — ROCURONIUM BROMIDE 50 MG: 10 SOLUTION INTRAVENOUS at 11:07

## 2024-07-23 RX ADMIN — SODIUM CHLORIDE: 9 INJECTION, SOLUTION INTRAVENOUS at 02:07

## 2024-07-23 RX ADMIN — ACETAMINOPHEN 1000 MG: 500 TABLET ORAL at 09:07

## 2024-07-23 RX ADMIN — INSULIN ASPART 1 UNITS: 100 INJECTION, SOLUTION INTRAVENOUS; SUBCUTANEOUS at 09:07

## 2024-07-23 RX ADMIN — HYDROMORPHONE HYDROCHLORIDE 0.4 MG: 2 INJECTION INTRAMUSCULAR; INTRAVENOUS; SUBCUTANEOUS at 01:07

## 2024-07-23 RX ADMIN — FAMOTIDINE 20 MG: 20 TABLET, FILM COATED ORAL at 08:07

## 2024-07-23 RX ADMIN — PROPOFOL 100 MG: 10 INJECTION, EMULSION INTRAVENOUS at 11:07

## 2024-07-23 RX ADMIN — METOCLOPRAMIDE 10 MG: 5 INJECTION, SOLUTION INTRAMUSCULAR; INTRAVENOUS at 03:07

## 2024-07-23 RX ADMIN — CEFAZOLIN 2 G: 330 INJECTION, POWDER, FOR SOLUTION INTRAMUSCULAR; INTRAVENOUS at 11:07

## 2024-07-23 RX ADMIN — EPHEDRINE SULFATE 10 MG: 50 INJECTION INTRAVENOUS at 12:07

## 2024-07-23 RX ADMIN — TRANEXAMIC ACID 1950 MG: 650 TABLET ORAL at 09:07

## 2024-07-23 RX ADMIN — SENNOSIDES AND DOCUSATE SODIUM 2 TABLET: 50; 8.6 TABLET ORAL at 08:07

## 2024-07-23 RX ADMIN — ONDANSETRON HYDROCHLORIDE 4 MG: 2 SOLUTION INTRAMUSCULAR; INTRAVENOUS at 12:07

## 2024-07-23 RX ADMIN — GABAPENTIN 300 MG: 300 CAPSULE ORAL at 09:07

## 2024-07-23 RX ADMIN — MIDAZOLAM 2 MG: 1 INJECTION INTRAMUSCULAR; INTRAVENOUS at 11:07

## 2024-07-23 RX ADMIN — METOCLOPRAMIDE 10 MG: 10 TABLET ORAL at 09:07

## 2024-07-23 RX ADMIN — CEFAZOLIN 2 G: 2 INJECTION, POWDER, FOR SOLUTION INTRAMUSCULAR; INTRAVENOUS at 10:07

## 2024-07-23 RX ADMIN — HYDROMORPHONE HYDROCHLORIDE 0.4 MG: 2 INJECTION INTRAMUSCULAR; INTRAVENOUS; SUBCUTANEOUS at 02:07

## 2024-07-23 NOTE — ANESTHESIA PREPROCEDURE EVALUATION
07/23/2024  Ana Appiah is a 84 y.o., female.      Ana Appiah    Pre-op Diagnosis: Primary osteoarthritis of right hip [M16.11]    Procedure(s):  ROBOTIC ARTHROPLASTY,HIP     Review of patient's allergies indicates:   Allergen Reactions    Penicillins      Other reaction(s): SOB, SWELLING AT INJ. SITE       Current Outpatient Medications   Medication Instructions    acetaminophen (TYLENOL) 650 mg, Oral, Every 8 hours PRN    allopurinoL (ZYLOPRIM) 100 MG tablet 1 tablet, Oral, Daily    amLODIPine (NORVASC) 10 mg, Oral, Nightly    diclofenac sodium (VOLTAREN) 2 g, Topical (Top), 4 times daily    folic acid (FOLVITE) 1 mg, Oral, Daily    irbesartan (AVAPRO) 300 mg, Oral, Daily    levothyroxine (SYNTHROID) 50 mcg, Oral, Before breakfast    metFORMIN (GLUCOPHAGE) 500 mg, Oral, Daily, WITH DINNER    methotrexate 2.5 MG Tab Take 6 tablets qweek. Ok to split dose and take 3 tab in am and 3 tab in pm. Hold for infection or fever.    omeprazole (PRILOSEC) 40 mg, Oral    ondansetron (ZOFRAN-ODT) 8 mg, Oral, Every 8 hours PRN    pantoprazole (PROTONIX) 20 mg, Oral, Daily    pravastatin (PRAVACHOL) 10 mg, Oral, Nightly    PROLIA 60 mg, Subcutaneous, Every 6 months       ROBOTIC ARTHROPLASTY,HIP;LA TOTAL HIP ARTHROPLASTY [22159];*    Past Medical History:   Diagnosis Date    Arthritis     Cancer     skin --resolved    Coronary artery disease     Diabetes mellitus     Digestive disorder     Gout, unspecified     Hypertension     Renal disorder     CKD    Thyroid disease        Past Surgical History:   Procedure Laterality Date    CATARACT EXTRACTION W/  INTRAOCULAR LENS IMPLANT      COLONOSCOPY      HIP REPLACEMENT ARTHROPLASTY Left     HYSTERECTOMY      Right hand surgery      SPINAL CORD STIMULATOR IMPLANT       Lab Results   Component Value Date    WBC 7.28 07/05/2024    HGB 13.5 07/05/2024    HCT 41.9  07/05/2024    MCV 96.3 (H) 07/05/2024     07/05/2024          BMP  Lab Results   Component Value Date     07/05/2024    K 4.6 07/05/2024     (H) 07/05/2024    CO2 24 07/05/2024    BUN 15.5 07/05/2024    CREATININE 1.13 (H) 07/05/2024    CALCIUM 9.2 07/05/2024    EGFRNONAA 49 05/23/2022     NAGENDRAN CARDIAC CLEARANCE    CXR 7/5/2024 NACPD       ECG  Vent. Rate : 082 BPM     Atrial Rate : 082 BPM      P-R Int : 172 ms          QRS Dur : 086 ms       QT Int : 396 ms       P-R-T Axes : 006 -30 069 degrees      QTc Int : 462 ms     Normal sinus rhythm   Left axis deviation   Abnormal ECG   No previous ECGs available   Confirmed by Ej Castillo MD ([id number]) on 7/8/2024 9:53:46 AM        Pre-op Assessment    I have reviewed the Patient Summary Reports.    I have reviewed the NPO Status.   I have reviewed the Medications.     Review of Systems  Anesthesia Hx:  No problems with previous Anesthesia             Denies Family Hx of Anesthesia complications.    Denies Personal Hx of Anesthesia complications.                    Social:  Non-Smoker       Cardiovascular:  Exercise tolerance: poor   Hypertension       Denies Angina.   Denies Orthopnea.  Denies PND.    Denies PADGETT.   Negative Nuclear Stress for ischemia Functional Capacity low / < 4 METS                         Renal/:  Chronic Renal Disease, CKD                Musculoskeletal:  Arthritis               Neurological:  Denies TIA.  Denies CVA.                                    Endocrine:  Diabetes Hypothyroidism          Psych:  Psychiatric Normal                    Physical Exam  General: Well nourished, Alert and Oriented    Airway:  Mallampati: III   Mouth Opening: Normal  TM Distance: Normal  Tongue: Normal  Neck ROM: Normal ROM    Dental:  Intact    Chest/Lungs:  Clear to auscultation    Heart:  Rate: Normal  Rhythm: Regular Rhythm  No pretibial edema  No carotid bruits      Anesthesia Plan  Type of Anesthesia, risks & benefits  discussed:    Anesthesia Type: Gen ETT  Intra-op Monitoring Plan: Standard ASA Monitors  Post Op Pain Control Plan: multimodal analgesia  Induction:  IV  Airway Plan: Direct, Post-Induction  Informed Consent: Informed consent signed with the Patient and all parties understand the risks and agree with anesthesia plan.  All questions answered. Patient consented to blood products? Yes  ASA Score: 3  Day of Surgery Review of History & Physical: H&P Update referred to the surgeon/provider.    Ready For Surgery From Anesthesia Perspective.     .

## 2024-07-23 NOTE — CONSULTS
[facility]    Hospital Medicine Consult History & Physical Examination       Patient Name: Ana Appiah  MRN: [id number]  Patient Class: OP- Observation   Admission Date: 7/23/2024   Admitting Physician: JASON Service   Length of Stay: 0  Attending Physician: Jarek Mehta MD  Primary Care Provider: Candie Clinton MD  Face-to-Face encounter date: 07/23/2024    Code Status: Not on file    Chief Complaint: OA right hip s/p RADHA        HISTORY OF PRESENT ILLNESS:   Ana Appiah is a 84 y.o. female who  has a past medical history of Arthritis, Cancer, Coronary artery disease, Diabetes mellitus, Digestive disorder, Gout, unspecified, Hypertension, Renal disorder, and Thyroid disease.. The patient presented to [facility] on 7/23/2024 with a primary complaint of OA right hip. She underwent right RADHA today. I was consulted for medical management. The pt was a little drowsy but was able to provide history. Her daughter is also present. She reports having ongoing hip pain that led to her surgery. She's had mild nausea post op. Denies cp, dyspnea, fever, chills or sweats. She has no c/o.    PAST MEDICAL HISTORY:     Past Medical History:   Diagnosis Date    Arthritis     Cancer     skin --resolved    Coronary artery disease     Diabetes mellitus     Digestive disorder     Gout, unspecified     Hypertension     Renal disorder     CKD    Thyroid disease        PAST SURGICAL HISTORY:     Past Surgical History:   Procedure Laterality Date    CATARACT EXTRACTION W/  INTRAOCULAR LENS IMPLANT      COLONOSCOPY      HIP REPLACEMENT ARTHROPLASTY Left     HYSTERECTOMY      Right hand surgery      SPINAL CORD STIMULATOR IMPLANT         ALLERGIES:   Penicillins    FAMILY HISTORY:   family history includes Cancer in her sister; Diabetes Mellitus in her mother.    SOCIAL HISTORY:     Social History     Tobacco Use    Smoking status: Former     Current packs/day: 0.00     Types:  Cigarettes     Quit date:      Years since quittin.5     Passive exposure: Never    Smokeless tobacco: Never   Substance Use Topics    Alcohol use: Not Currently     Comment: socially        HOME MEDICATIONS:     Prior to Admission medications    Medication Sig Start Date End Date Taking? Authorizing Provider   acetaminophen (TYLENOL) 650 MG TbSR Take 650 mg by mouth every 8 (eight) hours as needed.   Yes Provider, Historical   allopurinoL (ZYLOPRIM) 100 MG tablet Take 1 tablet by mouth once daily. 10/19/21  Yes Provider, Historical   amLODIPine (NORVASC) 10 MG tablet Take 10 mg by mouth nightly.   Yes Provider, Historical   diclofenac sodium (VOLTAREN) 1 % Gel Apply 2 g topically 4 (four) times daily.  Patient taking differently: Apply 2 g topically as needed. 3/8/24  Yes Paola Diehl FNP   folic acid (FOLVITE) 1 MG tablet Take 1 tablet (1 mg total) by mouth once daily. 3/8/24 9/4/24 Yes Paola Diehl FNP   irbesartan (AVAPRO) 300 MG tablet Take 300 mg by mouth once daily.   Yes Provider, Historical   levothyroxine (SYNTHROID) 50 MCG tablet Take 50 mcg by mouth before breakfast.   Yes Provider, Historical   metFORMIN (GLUCOPHAGE) 500 MG tablet Take 500 mg by mouth once daily. WITH DINNER   Yes Provider, Historical   methotrexate 2.5 MG Tab Take 6 tablets qweek. Ok to split dose and take 3 tab in am and 3 tab in pm. Hold for infection or fever. 3/8/24  Yes Paola Diehl FNP   ondansetron (ZOFRAN-ODT) 8 MG TbDL Take 1 tablet (8 mg total) by mouth every 8 (eight) hours as needed (nausea). 3/28/23  Yes Mary Maldonado PA   pantoprazole (PROTONIX) 20 MG tablet Take 20 mg by mouth once daily.   Yes Provider, Historical   pravastatin (PRAVACHOL) 10 MG tablet Take 10 mg by mouth every evening.   Yes Provider, Historical   PROLIA 60 mg/mL Syrg Inject 60 mg into the skin every 6 (six) months. 23  Yes Provider, Historical   ciprofloxacin HCl (CIPRO) 250 MG tablet Take 250  "mg by mouth 2 (two) times daily. 7/4/24 7/23/24 Yes Provider, Historical   metroNIDAZOLE (FLAGYL) 500 MG tablet Take 500 mg by mouth 3 (three) times daily. 7/4/24 7/23/24 Yes Provider, Historical   omeprazole (PRILOSEC) 40 MG capsule Take 40 mg by mouth. 7/7/23 7/23/24  Provider, Historical       REVIEW OF SYSTEMS:   Except as documented, all other systems reviewed and negative   Review of Systems   Constitutional:  Negative for chills and fever.   Respiratory:  Negative for cough and shortness of breath.    Cardiovascular:  Negative for chest pain and orthopnea.   Gastrointestinal:  Positive for nausea. Negative for abdominal pain, constipation, diarrhea and vomiting.   Musculoskeletal:  Positive for joint pain.   Neurological:  Negative for dizziness.         PHYSICAL EXAM:     VITAL SIGNS: 24 HRS MIN & MAX LAST   Temp  Min: 97.3 °F (36.3 °C)  Max: 97.9 °F (36.6 °C) 97.3 °F (36.3 °C)   BP  Min: 97/47  Max: 137/73 102/65   Pulse  Min: 67  Max: 80  70   Resp  Min: 10  Max: 20 16   SpO2  Min: 88 %  Max: 100 % 97 %       General appearance: Well-developed, well-nourished female in no apparent distress.  HENT: Atraumatic head. Moist mucous membranes of oral cavity.  Eyes: Normal extraocular movements.   Neck: Supple.   Lungs: Clear to auscultation bilaterally. No wheezing present.   Heart: Regular rate and rhythm. S1 and S2 present with no murmurs/gallop/rub. No pedal edema. No JVD present.   Abdomen: Soft, non-distended, non-tender. No rebound tenderness/guarding. Bowel sounds are normal.   Extremities: s/p right hip surgery.   Skin: No Rash.   Neuro: Motor and sensory exams grossly intact. Good tone. Muscle strength 5/5 in all 4 extremities  Psych/mental status: Appropriate mood and affect. Responds appropriately to questions.     LABS AND IMAGING:     Recent Labs   Lab 07/23/24  1342   HGB 11.3*   HCT 33.9*       No results for input(s): "NA", "K", "CL", "CO2", "ANIONGAP", "BUN", "CREATININE", "GLU", "CALCIUM", " ""PH", "MG", "ALBUMIN", "PROT", "ALKPHOS", "ALT", "AST", "BILITOT" in the last 168 hours.    Microbiology Results (last 7 days)       ** No results found for the last 168 hours. **             X-Ray Hip 1 View Right (with Pelvis when performed)  Narrative: EXAMINATION:  XR HIP WITH PELVIS WHEN PERFORMED, 1 VIEW RIGHT    CLINICAL HISTORY:  Post-op;    COMPARISON:  29 May 2024    FINDINGS:  Single frontal image of the right hip demonstrates expected changes of recent total hip arthroplasty.  Impression: As above    Electronically signed by: Aydin Ruiz  Date:    07/23/2024  Time:    14:12      Recent Results (from the past 24 hour(s))   POCT glucose    Collection Time: 07/23/24  9:21 AM   Result Value Ref Range    POCT Glucose 133 (H) 70 - 110 mg/dL   POCT glucose    Collection Time: 07/23/24  1:41 PM   Result Value Ref Range    POCT Glucose 150 (H) 70 - 110 mg/dL   Hemoglobin and Hematocrit    Collection Time: 07/23/24  1:42 PM   Result Value Ref Range    Hgb 11.3 (L) 12.0 - 16.0 g/dL    Hct 33.9 (L) 37.0 - 47.0 %   POCT glucose    Collection Time: 07/23/24  5:11 PM   Result Value Ref Range    POCT Glucose 208 (H) 70 - 110 mg/dL     __________________________________________________________________________  INPATIENT LIST OF MEDICATIONS     Scheduled Meds:   acetaminophen  1,000 mg Intravenous Once    Followed by    acetaminophen  500 mg Oral Q4H    allopurinoL  100 mg Oral Daily    [START ON 7/24/2024] aspirin  81 mg Oral BID    [START ON 7/26/2024] bisacodyL  10 mg Rectal Daily    ceFAZolin (Ancef) IV (PEDS and ADULTS)  2 g Intravenous Q6H    [START ON 7/24/2024] docusate sodium  200 mg Oral Daily    EPINEPHrine (PF)        EPINEPHrine (PF)        famotidine  20 mg Oral BID    folic acid  1 mg Oral Daily    ketorolac        ketorolac        [START ON 7/24/2024] levothyroxine  50 mcg Oral Before breakfast    [START ON 7/24/2024] losartan  100 mg Oral Daily    [START ON 7/24/2024] metFORMIN  500 mg Oral Daily with " dinner    metoclopramide  10 mg Intravenous Q6H    morphine        polyethylene glycol  17 g Oral QHS    ROPIvacaine 0.5% (PF)        ROPIvacaine 0.5% (PF)        senna-docusate 8.6-50 mg  2 tablet Oral BID    sodium chloride 0.9%        sodium chloride 0.9%        vancomycin        vancomycin                   ASSESSMENT & PLAN:     Right hip osteoarthritis s/p right RADHA  DM 2 A1c 5.9  HTN    Plan  Labs from 7/5/24 reviewed  Check labs in am  Add low dose insulin sliding scale  Postop care per ortho  Cont therapy  Dc dispo pending        Jarek Mehta MD   07/23/2024

## 2024-07-23 NOTE — PLAN OF CARE
Plan of care reviewed with patient. Pt is free of falls and injury. NADN, VSS. Pt tolerating medications well. Pt afebrile during shift. Patient had vomited 100mL after activity, patient felt better after throwing up. Questions and concerns answered.

## 2024-07-23 NOTE — OP NOTE
DATE OF PROCEDURE: 07/23/2024    PREOPERATIVE DIAGNOSIS: Arthritis,right hip.   POSTOPERATIVE DIAGNOSIS: Arthritis, right hip.   PROCEDURES PERFORMED: right total hip arthroplasty with robotic navigation.   SURGEON: Ney Kenney M.D.   ASSISTANT: First assistant:Elton Baig, certified physician assistant, who was necessary and essential for all aspects of the operation, including but no limited to patient positioning, surgical exposure, bony preparation, implantation, wound closure, and dressing placement.    ANESTHESIA: spinal    SPECIMEN: Femoral Head  FINDINGS: Arthritis  BLOOD LOSS: 120 ml  COMPLICATIONS: None.   COUNTS: Correct.   DISPOSITION: Recovery Room, stable.   IMPLANTS: Fielding Trident II size 46 mm acetabular component with a 36 mm x MDM liner, Norma  KEVIN size 7 cemented ,    hip stem with a 36 mm standard neck length MDM femoral head.   INDICATIONS FOR PROCEDURE: Ana Appiah is a 84 y.o. year old female  who is having   symptoms of right hip pain. Physical examination and imaging studies were   consistent with right hip arthritis. She did not respond to nonoperative   treatment measures. Treatment options were explained to the patient. She   decided to proceed with right total hip arthroplasty with robotic assistance.   She was aware of reasonable treatment options as well as risks and benefits, and   elected to undergo the procedure.   PROCEDURE IN DETAIL: After appropriate consent was obtained, the patient was   brought in the Operating Room, anesthesia was administered.  Prior to this, the patient received antibiotic prophylaxis.   She was then positioned in the lateral decubitus position with the right hip   pointed upward. Axillary roll was placed. Bony prominences were well padded.   Pegboard positioning system was utilized. The right hip and lower extremity   were then prepped and draped in the usual sterile fashion. A time out was called.  There were no concerns expressed  by members of the team, and the correct side was confirmed. Three small incisions were made over the iliac crest, followed by pin placement. The pelvic array was assembled and registered.  Anterolateral   approach to the hip was made. Incision was made over the greater trochanter and was taken down through the skin and tensor fascia.   The tensor fascia was split in line with the skin incision. Skin bleeders were coagulated with cautery. The sciatic nerve   was palpated and protected, it was out of harm's way and the Charnley retractor was placed. Femoral checkpoint was placed and registered.The anterolateral approach was performed by incising the anterior one-fourth of the gluteus medius while leaving a tendinous cuff for later repair. I then incised the gluteus minimus superiorly in line with the neck of the femur. A posterior capsulotomy was performed along the neck of the femur. The hip was externally rotated until the fibers of the vastus intermedius were identified. Retractors were repositioned to expose the anterior capsule and an anterior capsulotomy was performed. The hip was then externally rotated, dislocated, and a femoral neck osteotomy was made at the appropriate level. The femoral head and acetabulum were denuded of articular cartilage down to subchondral bone. Acetabular retractors were placed and the labrum was excised. The pelvic checkpoint was then registered followed by fine point registration of the acetabulum. Reaming was then performed to the planned depth and position robotically.  Utilizing the robotic interactive arm, the cup was impacted in 44° of abduction and 24° of anteversion. There was an excellent pressfit of the acetabular component. The liner was then pressfit into the cup.  The hip was then repositioned to expose the proximal femur. The box osteotome was used to lateralize the starting point on the femur, followed by the awl to open up the canal.  Sequential broaching was  performed up to a  size 8 broach. Trialing was performed with a 127-degree neck angle stem in 5 -degrees of anteversion.  The hip was reduced and there was excellent stability and restoration of leg lengths and offset, with no dislocation, subluxation, or impingement. At this point, the hip was dislocated with the aid of a neck hook.   The femoral trial component was removed.  A distal cement restrictor was placed and the canal was irrigated and dried.  Cement was mixed and the canal was filled and pressurized.  I then cemented the size 7 femoral component in place in the appropriate position of 5° of anteversion.  Excess cement was excised.  When the cement had cured stability was checked of the stem and was found to be excellent.  The actual femoral component was   firmly seated. The neck and calcar was inspected.  There was no crack or fracture. I remeasured at this point and, once again, we needed the standard neck length 36 mm diameter MDM femoral  head. The head was then impacted onto a clean, dry trunion. The   acetabulum was inspected. There was no loose body, foreign body, or soft tissue   interposition. The hip was then reduced, brought through range of motion, and   stable as previously described. At this point, the hip was soaked in a dilute betadine solution for three minutes, then irrigated. The soft tissues and capsule were then injected for postoperative pain control. Three small drill holes were made in the proximal femur, and through these drill holes the gluteus minimus was repaired the its insertion. The anterior one-fourth of the gluteus medius was repaired to its tendinous cuff. A looped PDS suture was used to close the iliotibial band, followed by reapproximation of the skin with 2-0 vicryl suture. A running 4-0 monocryl suture was used for skin closure along with steri-strips. Sterile dressings were applied along with an abduction pillow, and the patient was taken to recovery room in stable  condition.

## 2024-07-23 NOTE — TRANSFER OF CARE
"Anesthesia Transfer of Care Note    Patient: Ana Appiah    Procedure(s) Performed: Procedure(s) (LRB):  ROBOTIC ARTHROPLASTY,HIP (Right)    Patient location: PACU    Anesthesia Type: general    Transport from OR: Transported from OR on room air with adequate spontaneous ventilation    Post pain: adequate analgesia    Post assessment: no apparent anesthetic complications    Post vital signs: stable    Level of consciousness: sedated    Nausea/Vomiting: no nausea/vomiting    Complications: none    Transfer of care protocol was followed      Last vitals: Visit Vitals  /73   Pulse 80   Temp 36.6 °C (97.9 °F) (Tympanic)   Resp 20   Ht 5' 2" (1.575 m)   Wt 77.2 kg (170 lb 3.1 oz)   SpO2 95%   Breastfeeding No   BMI 31.13 kg/m²     "

## 2024-07-23 NOTE — ANESTHESIA POSTPROCEDURE EVALUATION
Anesthesia Post Evaluation    Patient: Ana Appiah    Procedure(s) Performed: Procedure(s) (LRB):  ROBOTIC ARTHROPLASTY,HIP (Right)    Final Anesthesia Type: general      Patient location during evaluation: PACU  Patient participation: Yes- Able to Participate  Level of consciousness: awake and alert and oriented  Post-procedure vital signs: reviewed and stable  Pain management: adequate  Airway patency: patent    PONV status at discharge: No PONV  Anesthetic complications: no      Cardiovascular status: hemodynamically stable  Respiratory status: unassisted  Hydration status: euvolemic  Follow-up not needed.              Vitals Value Taken Time   /62 07/23/24 1351   Temp 36.3 °C (97.3 °F) 07/23/24 1331   Pulse 73 07/23/24 1352   Resp 13 07/23/24 1352   SpO2 100 % 07/23/24 1352   Vitals shown include unfiled device data.      No case tracking events are documented in the log.      Pain/[place] Score: Pain Rating Prior to Med Admin: 7 (7/23/2024  1:50 PM)  [place] Score: 4 (7/23/2024  1:31 PM)

## 2024-07-24 LAB
ANION GAP SERPL CALC-SCNC: 8 MEQ/L
BUN SERPL-MCNC: 21.8 MG/DL (ref 9.8–20.1)
CALCIUM SERPL-MCNC: 7.7 MG/DL (ref 8.4–10.2)
CHLORIDE SERPL-SCNC: 109 MMOL/L (ref 98–107)
CO2 SERPL-SCNC: 20 MMOL/L (ref 23–31)
CREAT SERPL-MCNC: 1.3 MG/DL (ref 0.55–1.02)
CREAT/UREA NIT SERPL: 17
ERYTHROCYTE [DISTWIDTH] IN BLOOD BY AUTOMATED COUNT: 13.9 % (ref 11.5–17)
GFR SERPLBLD CREATININE-BSD FMLA CKD-EPI: 41 ML/MIN/1.73/M2
GLUCOSE SERPL-MCNC: 208 MG/DL (ref 82–115)
HCT VFR BLD AUTO: 36.3 % (ref 37–47)
HGB BLD-MCNC: 11.5 G/DL (ref 12–16)
MCH RBC QN AUTO: 30.5 PG (ref 27–31)
MCHC RBC AUTO-ENTMCNC: 31.7 G/DL (ref 33–36)
MCV RBC AUTO: 96.3 FL (ref 80–94)
NRBC BLD AUTO-RTO: 0 %
PLATELET # BLD AUTO: 185 X10(3)/MCL (ref 130–400)
PMV BLD AUTO: 10.5 FL (ref 7.4–10.4)
POCT GLUCOSE: 121 MG/DL (ref 70–110)
POCT GLUCOSE: 136 MG/DL (ref 70–110)
POCT GLUCOSE: 145 MG/DL (ref 70–110)
POTASSIUM SERPL-SCNC: 5.6 MMOL/L (ref 3.5–5.1)
RBC # BLD AUTO: 3.77 X10(6)/MCL (ref 4.2–5.4)
SODIUM SERPL-SCNC: 137 MMOL/L (ref 136–145)
WBC # BLD AUTO: 9.05 X10(3)/MCL (ref 4.5–11.5)

## 2024-07-24 PROCEDURE — 80048 BASIC METABOLIC PNL TOTAL CA: CPT | Performed by: SPECIALIST

## 2024-07-24 PROCEDURE — 99900031 HC PATIENT EDUCATION (STAT)

## 2024-07-24 PROCEDURE — G0378 HOSPITAL OBSERVATION PER HR: HCPCS

## 2024-07-24 PROCEDURE — 25000003 PHARM REV CODE 250: Performed by: NURSE PRACTITIONER

## 2024-07-24 PROCEDURE — 27000221 HC OXYGEN, UP TO 24 HOURS

## 2024-07-24 PROCEDURE — 85027 COMPLETE CBC AUTOMATED: CPT | Performed by: SPECIALIST

## 2024-07-24 PROCEDURE — 36415 COLL VENOUS BLD VENIPUNCTURE: CPT | Performed by: SPECIALIST

## 2024-07-24 PROCEDURE — 97166 OT EVAL MOD COMPLEX 45 MIN: CPT

## 2024-07-24 PROCEDURE — 63600175 PHARM REV CODE 636 W HCPCS: Performed by: INTERNAL MEDICINE

## 2024-07-24 PROCEDURE — 25000003 PHARM REV CODE 250: Performed by: INTERNAL MEDICINE

## 2024-07-24 PROCEDURE — 97110 THERAPEUTIC EXERCISES: CPT

## 2024-07-24 PROCEDURE — 97530 THERAPEUTIC ACTIVITIES: CPT

## 2024-07-24 PROCEDURE — 25000003 PHARM REV CODE 250: Performed by: SPECIALIST

## 2024-07-24 PROCEDURE — 96366 THER/PROPH/DIAG IV INF ADDON: CPT

## 2024-07-24 PROCEDURE — 63600175 PHARM REV CODE 636 W HCPCS: Performed by: SPECIALIST

## 2024-07-24 PROCEDURE — 96361 HYDRATE IV INFUSION ADD-ON: CPT

## 2024-07-24 PROCEDURE — 97116 GAIT TRAINING THERAPY: CPT

## 2024-07-24 PROCEDURE — 94761 N-INVAS EAR/PLS OXIMETRY MLT: CPT

## 2024-07-24 PROCEDURE — 96372 THER/PROPH/DIAG INJ SC/IM: CPT | Performed by: INTERNAL MEDICINE

## 2024-07-24 PROCEDURE — 94799 UNLISTED PULMONARY SVC/PX: CPT | Mod: XB

## 2024-07-24 PROCEDURE — 97535 SELF CARE MNGMENT TRAINING: CPT

## 2024-07-24 RX ORDER — METOCLOPRAMIDE 5 MG/1
5 TABLET ORAL EVERY 6 HOURS
Status: COMPLETED | OUTPATIENT
Start: 2024-07-24 | End: 2024-07-24

## 2024-07-24 RX ORDER — FAMOTIDINE 20 MG/1
20 TABLET, FILM COATED ORAL DAILY
Status: DISCONTINUED | OUTPATIENT
Start: 2024-07-24 | End: 2024-07-25 | Stop reason: HOSPADM

## 2024-07-24 RX ORDER — METFORMIN HYDROCHLORIDE 500 MG/1
500 TABLET, EXTENDED RELEASE ORAL
COMMUNITY

## 2024-07-24 RX ORDER — ACETAMINOPHEN 500 MG
500 TABLET ORAL EVERY 4 HOURS
Start: 2024-07-24 | End: 2024-08-07

## 2024-07-24 RX ORDER — METHOCARBAMOL 500 MG/1
500 TABLET, FILM COATED ORAL EVERY 12 HOURS PRN
Qty: 28 TABLET | Refills: 0 | Status: SHIPPED | OUTPATIENT
Start: 2024-07-24 | End: 2024-08-07

## 2024-07-24 RX ORDER — NAPROXEN SODIUM 220 MG/1
81 TABLET, FILM COATED ORAL 2 TIMES DAILY
Qty: 84 TABLET | Refills: 0 | Status: SHIPPED | OUTPATIENT
Start: 2024-07-24 | End: 2024-09-04

## 2024-07-24 RX ORDER — POLYETHYLENE GLYCOL 3350 17 G/17G
17 POWDER, FOR SOLUTION ORAL DAILY
Qty: 14 EACH | Refills: 0 | Status: SHIPPED | OUTPATIENT
Start: 2024-07-24 | End: 2024-08-07

## 2024-07-24 RX ORDER — TRAMADOL HYDROCHLORIDE 50 MG/1
25-50 TABLET ORAL EVERY 6 HOURS PRN
Qty: 28 TABLET | Refills: 0 | Status: SHIPPED | OUTPATIENT
Start: 2024-07-24 | End: 2024-07-31

## 2024-07-24 RX ADMIN — METOCLOPRAMIDE 10 MG: 10 TABLET ORAL at 05:07

## 2024-07-24 RX ADMIN — FOLIC ACID 1 MG: 1 TABLET ORAL at 09:07

## 2024-07-24 RX ADMIN — ACETAMINOPHEN 500 MG: 500 TABLET ORAL at 05:07

## 2024-07-24 RX ADMIN — ACETAMINOPHEN 500 MG: 500 TABLET ORAL at 09:07

## 2024-07-24 RX ADMIN — SODIUM ZIRCONIUM CYCLOSILICATE 5 G: 5 POWDER, FOR SUSPENSION ORAL at 10:07

## 2024-07-24 RX ADMIN — LOSARTAN POTASSIUM 100 MG: 50 TABLET, FILM COATED ORAL at 09:07

## 2024-07-24 RX ADMIN — ASPIRIN 81 MG 81 MG: 81 TABLET ORAL at 09:07

## 2024-07-24 RX ADMIN — ACETAMINOPHEN 500 MG: 500 TABLET ORAL at 01:07

## 2024-07-24 RX ADMIN — SODIUM CHLORIDE: 9 INJECTION, SOLUTION INTRAVENOUS at 05:07

## 2024-07-24 RX ADMIN — METFORMIN HYDROCHLORIDE 500 MG: 500 TABLET, FILM COATED ORAL at 04:07

## 2024-07-24 RX ADMIN — ALLOPURINOL 100 MG: 100 TABLET ORAL at 09:07

## 2024-07-24 RX ADMIN — INSULIN ASPART 2 UNITS: 100 INJECTION, SOLUTION INTRAVENOUS; SUBCUTANEOUS at 06:07

## 2024-07-24 RX ADMIN — SENNOSIDES AND DOCUSATE SODIUM 2 TABLET: 50; 8.6 TABLET ORAL at 09:07

## 2024-07-24 RX ADMIN — CEFAZOLIN 2 G: 2 INJECTION, POWDER, FOR SOLUTION INTRAMUSCULAR; INTRAVENOUS at 05:07

## 2024-07-24 RX ADMIN — METOCLOPRAMIDE 5 MG: 5 TABLET ORAL at 05:07

## 2024-07-24 RX ADMIN — FAMOTIDINE 20 MG: 20 TABLET, FILM COATED ORAL at 09:07

## 2024-07-24 RX ADMIN — METOCLOPRAMIDE 5 MG: 5 TABLET ORAL at 11:07

## 2024-07-24 RX ADMIN — TRAMADOL HYDROCHLORIDE 50 MG: 50 TABLET, COATED ORAL at 10:07

## 2024-07-24 RX ADMIN — ACETAMINOPHEN 500 MG: 500 TABLET ORAL at 02:07

## 2024-07-24 RX ADMIN — LEVOTHYROXINE SODIUM 50 MCG: 50 TABLET ORAL at 05:07

## 2024-07-24 RX ADMIN — SODIUM CHLORIDE: 9 INJECTION, SOLUTION INTRAVENOUS at 07:07

## 2024-07-24 RX ADMIN — DOCUSATE SODIUM 200 MG: 100 CAPSULE, LIQUID FILLED ORAL at 05:07

## 2024-07-24 RX ADMIN — METHOCARBAMOL 500 MG: 500 TABLET ORAL at 11:07

## 2024-07-24 RX ADMIN — METOCLOPRAMIDE 5 MG: 5 TABLET ORAL at 01:07

## 2024-07-24 RX ADMIN — METHOCARBAMOL 500 MG: 500 TABLET ORAL at 02:07

## 2024-07-24 NOTE — NURSING
Nurses Note -- 4 Eyes      7/23/2024   1:16 PM      Skin assessed during: Admit      [x] No Altered Skin Integrity Present    []Prevention Measures Documented      [] Yes- Altered Skin Integrity Present or Discovered   [] LDA Added if Not in Epic (Describe Wound)   [] New Altered Skin Integrity was Present on Admit and Documented in LDA   [] Wound Image Taken    Wound Care Consulted? No    Attending Nurse:  RAFFI Torres     Second RN/Staff Member:  RAFFI Washington

## 2024-07-24 NOTE — PROGRESS NOTES
[facility]  [facility] Medicine Progress Note      Patient Name: Ana Appiah  MRN: [id number]  Admission Date: 7/23/2024   Length of Stay: 0  Attending Physician: Mateo Belcher MD  Primary Care Provider: Candie Clinton MD  Face-to-Face encounter date: 07/24/2024    Code Status: Not on file        Chief Complaint:   Right hip pain        HPI:   Ana Appiah is a 84 y.o. female who  has a past medical history of Arthritis, Cancer, Coronary artery disease, Diabetes mellitus, Digestive disorder, Gout, unspecified, Hypertension, Renal disorder, and Thyroid disease.. The patient presented to [facility] on 7/23/2024 with a primary complaint of OA right hip. She underwent right RADHA today. I was consulted for medical management. The pt was a little drowsy but was able to provide history. Her daughter is also present. She reports having ongoing hip pain that led to her surgery. She's had mild nausea post op. Denies cp, dyspnea, fever, chills or sweats. She has no c/o.     Overview/Hospital Course:  No notes on file       Interval Hx:   No complaints    Review of Systems   All other systems reviewed and are negative.      Objective/physical exam:  General: In no acute distress, afebrile  Chest: Clear to auscultation bilaterally  Heart: RRR, +S1, S2, no appreciable murmur  Abdomen: Soft, nontender, BS +  MSK: right hip surgery  Neurologic: Alert and oriented x4,      VITAL SIGNS: 24 HRS MIN & MAX LAST   Temp  Min: 97.3 °F (36.3 °C)  Max: 97.9 °F (36.6 °C) 97.9 °F (36.6 °C)   BP  Min: 98/57  Max: 117/68 114/66   Pulse  Min: 65  Max: 79  79   Resp  Min: 16  Max: 18 18   SpO2  Min: 86 %  Max: 99 % 97 %       Recent Labs   Lab 07/23/24  1342 07/24/24  0407   WBC  --  9.05   RBC  --  3.77*   HGB 11.3* 11.5*   HCT 33.9* 36.3*   MCV  --  96.3*   MCH  --  30.5   MCHC  --  31.7*   RDW  --  13.9   PLT  --  185   MPV  --  10.5*       Recent Labs   Lab 07/24/24  041       K 5.6*   *   CO2 20*   BUN 21.8*   CREATININE 1.30*   CALCIUM 7.7*        Microbiology Results (last 7 days)       ** No results found for the last 168 hours. **             Radiology:  X-Ray Hip 1 View Right (with Pelvis when performed)  Narrative: EXAMINATION:  XR HIP WITH PELVIS WHEN PERFORMED, 1 VIEW RIGHT    CLINICAL HISTORY:  Post-op;    COMPARISON:  29 May 2024    FINDINGS:  Single frontal image of the right hip demonstrates expected changes of recent total hip arthroplasty.  Impression: As above    Electronically signed by: Aydin Ruiz  Date:    07/23/2024  Time:    14:12      Scheduled Med:   acetaminophen  500 mg Oral Q4H    allopurinoL  100 mg Oral Daily    aspirin  81 mg Oral BID    [START ON 7/26/2024] bisacodyL  10 mg Rectal Daily    docusate sodium  200 mg Oral Daily    famotidine  20 mg Oral Daily    folic acid  1 mg Oral Daily    levothyroxine  50 mcg Oral Before breakfast    losartan  100 mg Oral Daily    metFORMIN  500 mg Oral Daily with dinner    metoclopramide HCl  5 mg Oral Q6H    polyethylene glycol  17 g Oral QHS    senna-docusate 8.6-50 mg  2 tablet Oral BID        Continuous Infusions:   0.9% NaCl   Intravenous Continuous 75 mL/hr at 07/24/24 0536 New Bag at 07/24/24 0536        PRN Meds:    Current Facility-Administered Medications:     aluminum-magnesium hydroxide-simethicone, 30 mL, Oral, Q6H PRN    dextrose 10%, 12.5 g, Intravenous, PRN    dextrose 10%, 25 g, Intravenous, PRN    glucagon (human recombinant), 1 mg, Intramuscular, PRN    glucose, 16 g, Oral, PRN    glucose, 24 g, Oral, PRN    HYDROcodone-acetaminophen, 1 tablet, Oral, Q8H PRN    insulin aspart U-100, 0-5 Units, Subcutaneous, QID (AC + HS) PRN    lactulose, 20 g, Oral, Q6H PRN    melatonin, 6 mg, Oral, Nightly PRN    methocarbamoL, 500 mg, Oral, Q8H PRN    ondansetron, 4 mg, Intravenous, Q6H PRN    traMADoL, 50 mg, Oral, Q4H PRN     Nutrition Status:      Assessment/Plan:  Right hip osteoarthritis s/p  right RADHA 7/23  DM 2 A1c 5.9  HTN  Ckd   Hyperkalemia     Plan     Lokelma/labs in am  low dose insulin sliding scale/home meds resumed  Postop care per ortho  Cont therapy  Dc dispo pending            All diagnosis and differential diagnosis have been reviewed; assessment and plan has been documented; I have personally reviewed the labs and test results that are presently available; I have reviewed the patients medication list; I have reviewed the consulting providers response and recommendations. I have reviewed or attempted to review medical records based upon their availability      _____________________________________________________________________            Mateo Belcher MD   07/24/2024

## 2024-07-24 NOTE — PLAN OF CARE
S/p Rt THR. Spk w pt ... Teresa to asst w homecare. Pt has RW. Provider list given. Foc obtained.   Called referral for outpatient therapy to [facility] and if can not get in soon will use [facility]. They will contact pt with appt date & time.   PCP: Candie Clinton MD   Rx: Rody Arriola Abyangelica      Patient DID participate in a pre-op exercise regimen          07/24/24 1028   Discharge Assessment   Assessment Type Discharge Planning Assessment   Confirmed/corrected address, phone number and insurance Yes   Confirmed Demographics Correct on Facesheet   Source of Information patient;family   When was your last doctors appointment? 07/15/24   Communicated FENG with patient/caregiver Date not available/Unable to determine;Yes   Reason For Admission Rt hip surgery   People in Home child(shahzad), adult   Do you expect to return to your current living situation? Yes   Do you have help at home or someone to help you manage your care at home? Yes   Who are your caregiver(s) and their phone number(s)? dgt Teresa   Prior to hospitilization cognitive status: Alert/Oriented   Current cognitive status: Alert/Oriented   Walking or Climbing Stairs Difficulty yes   Walking or Climbing Stairs ambulation difficulty, requires equipment   Mobility Management small quad cane as needed   Dressing/Bathing Difficulty yes   Dressing/Bathing bathing difficulty, requires equipment   Dressing/Bathing Management walk in tub   Home Layout Able to live on 1st floor   Equipment Currently Used at Home cane, quad;walker, rolling;other (see comments)  (has a handicap height toilet)   Readmission within 30 days? No   Patient currently being followed by outpatient case management? No   Do you currently have service(s) that help you manage your care at home? No   Do you take prescription medications? Yes   Do you have any problems affording any of your prescribed medications? No   Is the patient taking medications as prescribed? yes   Who is going to  help you get home at discharge? Teresa   How do you get to doctors appointments? car, drives self;family or friend will provide   Are you on dialysis? No   Do you take coumadin? No   Discharge Plan A Home   Discharge Plan B Home   DME Needed Upon Discharge  none   Discharge Plan discussed with: Patient;Adult children   Transition of Care Barriers None   Financial Resource Strain   How hard is it for you to pay for the very basics like food, housing, medical care, and heating? Somewhat   Housing Stability   In the last 12 months, was there a time when you were not able to pay the mortgage or rent on time? N   At any time in the past 12 months, were you homeless or living in a shelter (including now)? N   Transportation Needs   Has the lack of transportation kept you from medical appointments, meetings, work or from getting things needed for daily living? No   Food Insecurity   Within the past 12 months, you worried that your food would run out before you got the money to buy more. Never true   Within the past 12 months, the food you bought just didn't last and you didn't have money to get more. Never true   Stress   Do you feel stress - tense, restless, nervous, or anxious, or unable to sleep at night because your mind is troubled all the time - these days? Not at all   Social Isolation   How often do you feel lonely or isolated from those around you?  Never   Alcohol Use   Q1: How often do you have a drink containing alcohol? Never   Q2: How many drinks containing alcohol do you have on a typical day when you are drinking? None   Q3: How often do you have six or more drinks on one occasion? Never   Utilities   In the past 12 months has the electric, gas, oil, or water company threatened to shut off services in your home? No   Health Literacy   How often do you need to have someone help you when you read instructions, pamphlets, or other written material from your doctor or pharmacy? Sometimes   OTHER   Name(s) of  People in Home Teresa

## 2024-07-24 NOTE — PLAN OF CARE
Problem: Occupational Therapy  Goal: Occupational Therapy Goal  Description: Pt will perform LB dressing SB with AE PRN by d/c.  Pt will perform toileting SBA by d/c.  Pt will perform toilet t/f SBA by d/c.  Pt will perform car t/f SBA in adherence to pxns by d/c.   Outcome: Progressing

## 2024-07-24 NOTE — NURSING
Patient unable to urinate, bladder scan showed >431 mL. In and out catheter performed and got 525 mL. Patient educated that they are due to void at 0045.

## 2024-07-24 NOTE — PLAN OF CARE
Problem: Physical Therapy  Goal: Physical Therapy Goal  Description: Pt will improve functional independence by performing:    Bed mobility: SBA  Sit to stand: SBA with rolling walker  Bed to chair t/f: SBA with Stand Step  with rolling walker  Ambulation x 200'  with SBA with rolling walker   1 Step (Curb): Min A  with rolling walker MET  3 Steps: Min A  with B HR MET  Independent with total hip HEP   Outcome: Progressing

## 2024-07-24 NOTE — PROGRESS NOTES
"No acute events overnight.  Pain controlled.  Resting in bed.    Vital Signs  Temp: 97.7 °F (36.5 °C)  Temp Source: Oral  Pulse: 69  Heart Rate Source: Monitor  Resp: 18  SpO2: 99 %  Pulse Oximetry Type: Intermittent  Flow (L/min) (Oxygen Therapy): 3  Oxygen Concentration (%): 80  Device (Oxygen Therapy): nasal cannula  BP: 105/61  BP Location: Right arm  BP Method: Automatic  Patient Position: Lying  Height and Weight  Height: 5' 2" (157.5 cm)  Weight: 77.2 kg (170 lb 3.1 oz)  Weight Method: Standard Scale  BSA (Calculated - sq m): 1.84 sq meters  BMI (Calculated): 31.1  Weight in (lb) to have BMI = 25: 136.4]    +FHL/EHL  Brisk capillary refill distally  Dressing c/d/i  Sensation intact to light touch distally    Recent Lab Results  (Last 5 results in the past 24 hours)        07/24/24  0412   07/24/24  0407   07/23/24  2109   07/23/24  1711   07/23/24  1342        Anion Gap 8.0               BUN 21.8               BUN/CREAT RATIO 17               Calcium 7.7               Chloride 109               CO2 20               Creatinine 1.30               eGFR 41               Glucose 208               Hematocrit   36.3       33.9       Hemoglobin   11.5       11.3       MCH   30.5             MCHC   31.7             MCV   96.3             MPV   10.5             nRBC   0.0             Platelet Count   185             POCT Glucose     209   208         Potassium 5.6               RBC   3.77             RDW   13.9             Sodium 137               WBC   9.05                                    A/P:  Status post right total hip arthroplasty  Overall patient doing well.  Therapy for mobility and ambulation.  Aspirin for DVT PPx    "

## 2024-07-24 NOTE — PT/OT/SLP PROGRESS
Occupational Therapy   Treatment    Name: Ana Appiah  MRN: [id number]  Admitting Diagnosis:  Primary osteoarthritis of right hip  1 Day Post-Op    Recommendations:     Discharge Recommendations: Low Intensity Therapy  Discharge Equipment Recommendations:  walker, rolling  Barriers to discharge:  None    Assessment:     Ana Appiah is a 84 y.o. female with a medical diagnosis of Primary osteoarthritis of right hip.  Performance deficits affecting function are weakness, orthopedic precautions, impaired joint extensibility, decreased lower extremity function, decreased safety awareness, impaired functional mobility, impaired self care skills, gait instability, impaired balance, decreased ROM.     Rehab Prognosis:  Fair; patient would benefit from acute skilled OT services to address these deficits and reach maximum level of function.       Plan:     Patient to be seen daily (BID) to address the above listed problems via self-care/home management, therapeutic activities, therapeutic exercises  Plan of Care Expires: 07/30/24  Plan of Care Reviewed with: patient, daughter    Subjective     Chief Complaint: fatigue  Patient/Family Comments/goals: to go home  Pain/Comfort:  Pain Rating 1: 0/10    Objective:     Patient found HOB elevated with peripheral IV, telemetry upon OT entry to room.    General Precautions: Standard, fall    Orthopedic Precautions:RLE partial weight bearing, RLE posterior precautions (75%)  Braces: N/A  Respiratory Status: Room air     Occupational Performance:     Bed Mobility:    Patient completed Scooting/Bridging with stand by assistance  Patient completed Supine to Sit with stand by assistance     Functional Mobility/Transfers:  Patient completed Sit <> Stand Transfer with contact guard assistance  with  rolling walker   Patient completed Toilet Transfer Step Transfer technique with stand by assistance with  rolling walker and bedside commode  Patient completed  Shower Transfer  Step Transfer technique with contact guard assistance with rolling walker. Simulated safe step walk-in tub, pt instructed to side-step into shower.  Functional Mobility: Pt performed FM in hallway with RW and CGA-SBA, tolerated ~200 feet x 2 with no LOBs or rest breaks needed.    Treatment & Education:  Encouraged safe transfer techniques, mod verbal cues for hand placement during walk-in tub simulation    Patient left  on BSC over toilet  with all lines intact, call button in reach, and daughter present    GOALS:   Multidisciplinary Problems       Occupational Therapy Goals          Problem: Occupational Therapy    Goal Priority Disciplines Outcome Interventions   Occupational Therapy Goal     OT, PT/OT Progressing    Description: Pt will perform LB dressing SB with AE PRN by d/c.  Pt will perform toileting SBA by d/c.  Pt will perform toilet t/f SBA by d/c.  Pt will perform car t/f SBA in adherence to pxns by d/c.                        Time Tracking:     OT Date of Treatment: 07/24/24  OT Start Time: 1400  OT Stop Time: 1423  OT Total Time (min): 23 min    Billable Minutes:Self Care/Home Management 10  Therapeutic Activity 13    OT/ROBERT: OT          7/24/2024

## 2024-07-24 NOTE — PT/OT/SLP PROGRESS
Physical Therapy Treatment    Patient Name:  Ana Appiah   MRN:  [id number]    Recommendations:     Discharge Recommendations: Low Intensity Therapy  Discharge Equipment Recommendations: walker, rolling  Barriers to discharge: None    Assessment:     Ana Appiah is a 84 y.o. female admitted with a medical diagnosis of Primary osteoarthritis of right hip.  She presents with the following impairments/functional limitations: weakness, impaired endurance, impaired functional mobility, decreased lower extremity function, pain, decreased ROM, edema, orthopedic precautions .    Rehab Prognosis: Good; patient would benefit from acute skilled PT services to address these deficits and reach maximum level of function.    Recent Surgery: Procedure(s) (LRB):  ROBOTIC ARTHROPLASTY,HIP (Right) 1 Day Post-Op    Plan:     During this hospitalization, patient to be seen BID to address the identified rehab impairments via gait training, therapeutic activities, therapeutic exercises and progress toward the following goals:    Plan of Care Expires:  07/29/24    Subjective     Chief Complaint: R hip pain  Patient/Family Comments/goals:   Pain/Comfort:  Location - Side 2: Right  Location 2: hip  Pain Addressed 2: Pre-medicate for activity, Reposition, Distraction, Cessation of Activity      Objective:     Communicated with nurse prior to session.  Patient found supine with peripheral IV, telemetry upon PT entry to room.     General Precautions: Standard, fall  Orthopedic Precautions: RLE partial weight bearing, RLE posterior precautions (75% WB)  Braces:    Respiratory Status: Room air     Functional Mobility:  Bed Mobility:     Supine to Sit: minimum assistance using HR  Transfers:     Sit to Stand:  contact guard assistance with rolling walker  Toilet Transfer: contact guard assistance with  rolling walker  using  Step Transfer  Gait: Pt ambulated 250, 300 ft w rw and CGA, using step through gait pattern at slow pace. Pt  "performed well with no LOB  Stairs:  Pt ascended/descended 3 stair(s) and 4" curb step with Rolling Walker with bilateral handrails with Contact Guard Assistance.         Treatment & Education:  Pt edu on total hip precautions, partial WB 75%, and importance of frequent mobility  Pt completed RADHA therex x10 AAROM    Patient left up in chair with all lines intact, call button in reach, nurse notified, and daughter present..    GOALS:   Multidisciplinary Problems       Physical Therapy Goals          Problem: Physical Therapy    Goal Priority Disciplines Outcome Goal Variances Interventions   Physical Therapy Goal     PT, PT/OT Progressing     Description: Pt will improve functional independence by performing:    Bed mobility: SBA  Sit to stand: SBA with rolling walker  Bed to chair t/f: SBA with Stand Step  with rolling walker  Ambulation x 200'  with SBA with rolling walker   1 Step (Curb): Min A  with rolling walker MET  3 Steps: Min A  with B HR MET  Independent with total hip HEP                        Time Tracking:     PT Received On:    PT Start Time: 0910     PT Stop Time: 0953  PT Total Time (min): 43 min     Billable Minutes: Gait Training 20, Therapeutic Activity 13, and Therapeutic Exercise 10    Treatment Type: Treatment  PT/PTA: PT     Number of PTA visits since last PT visit: 0     07/24/2024  "

## 2024-07-24 NOTE — PLAN OF CARE
Problem: Physical Therapy  Goal: Physical Therapy Goal  Description: Pt will improve functional independence by performing:    Bed mobility: SBA   Sit to stand: SBA with rolling walker MET  Bed to chair t/f: SBA with Stand Step  with rolling walker MET  Ambulation x 200'  with SBA with rolling walker  MET  1 Step (Curb): Min A  with rolling walker MET  3 Steps: Min A  with B HR MET  Independent with total hip HEP   7/24/2024 1744 by Eva Luu, PT  Outcome: Progressing

## 2024-07-24 NOTE — DISCHARGE INSTRUCTIONS
[facility]  [facility] [facility] [address]  [place] [address]  Phone [phone]       /      Fax [phone]  SURGEON: Dr. Kenney    After discharge, all questions or concerns should be handled at your surgeon's office ([phone]). If it is a weekend or after hours, you will get the surgeon on call.     Discharge Medications:    PAIN MANAGEMENT: Next Dose Available   Tylenol/Acetaminophen 500mg- every 4 hours, around the clock (WHILE AWAKE) 6:00 PM on 7/25/24   Ultram/Tramadol 50mg (Pain Med) - 1/2 to 1 whole tablet every 6 hours AS NEEDED for pain    Claritin 10mg daily (if needed for itching with Tramadol) 6:00 PM if needed on 7/25/24   Robaxin/Methocarbamol 500mg (Muscle Relaxer) - Every 12-24 hours AS NEEDED for muscle spasms, thigh pain or additional pain control 11:30 PM if needed on 7/25/24   COMPLICATION PREVENTION MEDS: Next Dose DUE   Aspirin 81mg twice a day for 6 weeks post-op for blood clot prevention PM on 7/25/2024   Colace or Mónica-Colace -  while on pain meds and muscle relaxers for constipation prevention PM on 7/25/2024   NOZIN NASAL  - twice a day for 2 weeks or until supply runs out, whichever comes first (Infection prevention) PM on 7/25/2024         Total Hip Replacement                                                                                                                                  PAIN MEDICATIONS/PAIN MANAGEMENT: (Use the medication log in your discharge packet to keep track of your medications)  To preserve your kidney function, NO anti-inflammatories (Ibuprofen, Aleve, Motrin, Naproxen, Mobic/Meloxicam, Toradol/Ketorolac, Celebrex, Diclofenac/Voltaren...etc)     Tylenol/Acetaminophen 500mg every 4 hours, around the clock (WHILE AWAKE).   Take Ultram (Tramadol) 50mg (pain pill) you can take 1/2 to 1 whole tablet every 6 hours as needed for BREAKTHROUGH pain.    **NO MORE THAN 3000mg OF TYLENOL IN 24 HOURS**.     Robaxin/Methocarbamol  500mg (muscle relaxer)- 1/2 tablet - you can take every 12-24 hours as needed for muscle spasms, thigh pain and stiffness, additional pain control or breakthrough pain medications. This medication is helpful for pain control while lessening your need for narcotics. Please reduce the use gradually as the pain and spasms lessen. DO NOT TAKE AT THE SAME TIME AS A PAIN PILL. YOU WILL BE BETTER SERVED WITH 2 HOURS BETWEEN PAIN PILL AND MUSCLE RELAXER.     **Other things that help with pain control is WALKING, COMPRESSION WRAP, ICE and ELEVATION!!**    BLOOD CLOT PREVENTION:   Aspirin 81mg (blood thinner) - twice a day for 6 weeks for blood clot prevention. Start on the evening of 7/25/24. Stop on 9/5/24.  If you were taking Baby Aspirin 81mg prior to surgery, may return to daily dose AFTER 6 weeks - 9/6/24.    You need to continuing wearing your compression stocking (DENG Hose - ThromboEmbolic Disease Prevention Device) for the next 2-6 weeks post-op. It is ok to remove them for hygiene and at bedtime.   Hand wash and Dry. **If the swelling persists in the legs after you stop wearing the Deng hose, continue to wear them until the swelling decreases.**  REMOVE STOCKINGS AT LEAST DAILY FOR SKIN ASSESSMENT.   Do NOT let the stockings roll down, creating a tourniquet around the back of your knee. If you need to, leave the excess at the bottom of the stocking.   The best thing you can do to prevent blood clots is to walk around as much as possible, AT LEAST EVERY 1-2 HOURS.       CONSTIPATION PREVENTION:   Miralax or Senokot S/Mónica-Colace and Stool softeners EVERY DAY while on pain meds.  Use other more aggressive over the counter LAXATIVES as needed for constipation (Examples: Milk of Magnesia, Dulcolax tabs or suppository, Magnesium Citrate, Fleet's Enema...etc.)   Drink lots of water.  Increase Fiber in diet.  Increase walking distance each day  DO NOT GO MORE THAN 2 DAYS WITHOUT HAVING A BOWEL MOVEMENT!    ACTIVITY:    Weight bearing precautions as follows:  75% Partial weight bearing to operative leg with walker.   HIP PRECAUTIONS:  NO Twisting  NO Leaning past 90 degrees  NO Crossing legs    DO NOT TAKE YOURSELF OFF OF THE WALKER TOO SOON. ALLOW YOUR OUTPATIENT THERAPIST or SURGEON TO GUIDE YOU.   Change positions often throughout the day.   Walk around at least every 1-2 hours while awake.   No heavy lifting, pulling, pushing or straining.  Ice the Hip and thigh AS MUCH AS POSSIBLE  Outpatient Physical Therapy - Bring prescription to clinic of choice as soon as possible.        WOUND CARE:   Operative Hip poke hole - Occlusive Coverlet dressing (Small White bandage)  - change every other day and as needed for soiling. Start SATURDAY.  NO ointments, creams, lotions or antiseptics on the incision. NOTIFY MD OF EXCESSIVE WOUND DRAINAGE.     Operative Hip Incision - Silver Mepilex 7-day bandage- Do NOT REMOVE until change date 7/30/24 unless soiled. NO ointments, creams, lotions or antiseptics on the incision. Once removed on the change date, apply occlusive coverlet dressing (long white bandage) and change every other day and as needed for soiling for the next 7 days. NOTIFY MD OF EXCESSIVE WOUND DRAINAGE.     DO NOT PULL ON THE STERI-STRIPS. Allow them to fall off as time goes by. Ok to leave open to air and wet the wound 2 weeks post-op (8/8/24).    DO NOT WET WOUND or apply any ointments, creams, lotions or antiseptics.  Ace wrap - apply your compression stocking and apply the ace wrap where the stocking stops for extra added compression to the knee and thigh.   Ok to shower before then if able to keep wound from getting wet (plastic barrier, saran wrap or cling wrap and tape).   DO NOT TOUCH INCISION      URINARY RETENTION:  If you start having difficulty urinating, decrease the use of Pain pills and muscle relaxers and notify your primary care doctor.     PNEUMONIA PREVENTION:  Stay out of bed as much as possible and walk  around every 1-2 hours.  Continue breathing exercises (Incentive Spirometry) every 1-2 hours while mobility is limited and while you are on pain pills.    FALL PREVENTION:  Wear sturdy shoes that fit well - Wearing shoes with high heels or slippery soles, or shoes that are too loose, can lead to falls. Walking around in bare feet, or only socks, can also increase your risk of falling.  Use walker as long as your surgeon and therapist recommend it  Use good lighting and  throw rugs, electrical cords, furniture and clutter (anything than can cause you to trip at home.   Non-slip rug in bathroom or shower    INFECTION PREVENTION:  NOZIN ANTISEPTIC NASAL  - twice a day for 2 weeks or until supply runs out, whichever comes first. Shake bottle well, saturate cotton swab with 4 drops of antiseptic solution. Swab right nostril rim 6-8 times clockwise and counterclockwise. Take swab out, apply 2 more drops then swab left nostril rim 6-8 times clockwise and counterclockwise.   Proper handwashing before and after dressing changes. Do not wet the wound. Wound care instructions as written above. NOTIFY MD OF EXCESSIVE WOUND DRAINAGE.  No alcohol, smoking or tobacco products  Pets should not be allowed around the wound or the dressing.   Treat UTI and skin infections as soon as possible.  Pre-medicate with antibiotics prior to dental or surgical procedures.   If you are diabetic, MAINTAIN GOOD BLOOD SUGAR CONTROL (Below 150) DURING YOUR RECOVERY. If you see high numbers, notify your primary care doctor.     Call your SURGEON'S OFFICE ([phone]) if you experience the following signs and symptoms of infection:   Unusual redness, swelling, excessive, cloudy or foul smelling drainage at the incision site.   Persistent low grade temp OR a temp greater than 102 F, unrelieved by Tylenol  Pain at surgical site, unrelieved by pain meds    Warning signs of a blood clot in your leg: (CALL YOUR SURGEON)  New onset or  increasing pain in calf, new onset tenderness or redness above or below the knee or increasing swelling of your calf, ankle, or foot.  Warning signs that a blood clot has traveled to your lungs: (REPORT TO THE ER/CALL 186)  Sudden or increase in Shortness of breath, sudden onset of chest pains, or  Localized chest pain with coughing.       IF ANY ISSUES ARISE AND YOU FEEL THE NEED TO CALL YOUR PRIMARY CARE DOCTOR, PLEASE LET YOUR SURGEON KNOW AS WELL.     For emergencies, please report to OUR ([facility] or [facility] main [place]) Emergency department and tell them to call YOUR SURGEON at [phone].     BEFORE MAKING ANY CHANGES TO THE MEDICAL CARE PLAN OR GOING TO THE EMERGENCY ROOM, PLEASE CONTACT THE SURGEON.    After discharge, all questions or concerns should be handled at your surgeon's office ([phone]). If it is a weekend or after hours, you will get the surgeon on call.

## 2024-07-24 NOTE — PLAN OF CARE
Problem: Adult Inpatient Plan of Care  Goal: Plan of Care Review  Outcome: Progressing  Goal: Patient-Specific Goal (Individualized)  Outcome: Progressing  Goal: Absence of Hospital-Acquired Illness or Injury  Outcome: Progressing  Goal: Optimal Comfort and Wellbeing  Outcome: Progressing  Goal: Readiness for Transition of Care  Outcome: Progressing     Problem: Diabetes Comorbidity  Goal: Blood Glucose Level Within Targeted Range  Outcome: Progressing     Problem: Wound  Goal: Optimal Coping  Outcome: Progressing  Goal: Optimal Functional Ability  Outcome: Progressing  Goal: Absence of Infection Signs and Symptoms  Outcome: Progressing  Goal: Improved Oral Intake  Outcome: Progressing  Goal: Optimal Pain Control and Function  Outcome: Progressing  Goal: Skin Health and Integrity  Outcome: Progressing  Goal: Optimal Wound Healing  Outcome: Progressing     Problem: Infection  Goal: Absence of Infection Signs and Symptoms  Outcome: Progressing     Problem: Hip Arthroplasty  Goal: Optimal Coping  Outcome: Progressing  Goal: Absence of Bleeding  Outcome: Progressing  Goal: Effective Bowel Elimination  Outcome: Progressing  Goal: Fluid and Electrolyte Balance  Outcome: Progressing  Goal: Optimal Functional Ability  Outcome: Progressing  Goal: Absence of Infection Signs and Symptoms  Outcome: Progressing  Goal: Intact Neurovascular Status  Outcome: Progressing  Goal: Anesthesia/Sedation Recovery  Outcome: Progressing  Goal: Acceptable Pain Control  Outcome: Progressing  Goal: Nausea and Vomiting Relief  Outcome: Progressing  Goal: Effective Urinary Elimination  Outcome: Progressing  Goal: Effective Oxygenation and Ventilation  Outcome: Progressing     Problem: Fall Injury Risk  Goal: Absence of Fall and Fall-Related Injury  Outcome: Progressing     Problem: Comorbidity Management  Goal: Maintenance of Asthma Control  Outcome: Progressing  Goal: Maintenance of Behavioral Health Symptom Control  Outcome:  Progressing  Goal: Maintenance of COPD Symptom Control  Outcome: Progressing  Goal: Maintenance of Heart Failure Symptom Control  Outcome: Progressing  Goal: Blood Pressure in Desired Range  Outcome: Progressing  Goal: Maintenance of Osteoarthritis Symptom Control  Outcome: Progressing  Goal: Bariatric Home Regimen Maintained  Outcome: Progressing  Goal: Maintenance of Seizure Control  Outcome: Progressing

## 2024-07-24 NOTE — PT/OT/SLP EVAL
"Occupational Therapy   Evaluation    Name: Ana Appiah  MRN: [id number]  Admitting Diagnosis: Primary osteoarthritis of right hip  Recent Surgery: Procedure(s) (LRB):  ROBOTIC ARTHROPLASTY,HIP (Right) 1 Day Post-Op    Recommendations:     Discharge Recommendations: Low Intensity Therapy  Discharge Equipment Recommendations:  walker, rolling  Barriers to discharge:  None    Assessment:     Ana Appiah is a 84 y.o. female with a medical diagnosis of Primary osteoarthritis of right hip.  Performance deficits affecting function: weakness, decreased lower extremity function, impaired balance, gait instability, impaired functional mobility, impaired self care skills, orthopedic precautions, impaired joint extensibility, decreased ROM, decreased safety awareness.      Rehab Prognosis: Good; patient would benefit from acute skilled OT services to address these deficits and reach maximum level of function.       Plan:     Patient to be seen daily (BID) to address the above listed problems via self-care/home management, therapeutic activities, therapeutic exercises  Plan of Care Expires: 07/30/24  Plan of Care Reviewed with: patient, daughter    Subjective     Chief Complaint: no complaints  Patient/Family Comments/goals: "I'm not getting dressed until I get my sponge bath."    Occupational Profile:  Living Environment: lives in [facility] with daughter, 1STE. Has a walk-in tub with grab bars and [facility]  Previous level of function: mod I with hurrycane for FM  Roles and Routines: retried  Equipment Used at Home: walker, rolling (hurrycane)  Assistance upon Discharge: daughter    Pain/Comfort:  Pain Rating 1: 0/10    Patients cultural, spiritual, Nondenominational conflicts given the current situation: no    Objective:     Communicated with: PT prior to session.  Patient found up in chair with peripheral IV, telemetry upon OT entry to room.    General Precautions: Standard, fall  Orthopedic Precautions: RLE partial weight " bearing, RLE posterior precautions (75%)  Braces: N/A  Respiratory Status: Room air    Occupational Performance:    Functional Mobility/Transfers:  Patient completed Sit <> Stand Transfer with contact guard assistance  with  rolling walker   Patient completed car transfer with contact guard assistance and mod verbal cues for proper, safe technique with rolling walker.   Functional Mobility: Pt performed FM in hallway with RW and CGA, tolerated ~130 feet x 2 with no LOBs or rest breaks needed.    Activities of Daily Living:  Lower Body Dressing: stand by assistance with use of reacher and sock aid to doff and don socks, following instruction and demonstration from OT    Cognitive/Visual Perceptual:  Cognitive/Psychosocial Skills:     -       Oriented to: Person, Place, Time, and Situation   -       Follows Commands/attention:Follows multistep  commands  -       Communication: clear/fluent  -       Memory: No Deficits noted  -       Safety awareness/insight to disability: impaired   -       Mood/Affect/Coping skills/emotional control: Appropriate to situation and Cooperative  Visual/Perceptual:      -Intact      Physical Exam:  Motor Planning: -       intact  Upper Extremity Range of Motion:     -       Right Upper Extremity: WFL  -       Left Upper Extremity: WFL  Upper Extremity Strength:    -       Right Upper Extremity: WFL  -       Left Upper Extremity: WFL    Treatment & Education:  Pt educated on roles and goals of occupational therapy, POC for acute stay. Unable to recall any of her hip precautions, required additional instruction for no twisting, leaning past 90 degrees, or crossing. Pt also required cues during car transfer regarding bending past 90 degrees.    Patient left up in chair with all lines intact, call button in reach, and daughter present    GOALS:   Multidisciplinary Problems       Occupational Therapy Goals          Problem: Occupational Therapy    Goal Priority Disciplines Outcome  Interventions   Occupational Therapy Goal     OT, PT/OT Progressing    Description: Pt will perform LB dressing SB with AE PRN by d/c.  Pt will perform toileting SBA by d/c.  Pt will perform toilet t/f SBA by d/c.  Pt will perform car t/f SBA in adherence to pxns by d/c.                        History:     Past Medical History:   Diagnosis Date    Arthritis     Cancer     skin --resolved    Coronary artery disease     Diabetes mellitus     Digestive disorder     Gout, unspecified     Hypertension     Renal disorder     CKD    Thyroid disease          Past Surgical History:   Procedure Laterality Date    CATARACT EXTRACTION W/  INTRAOCULAR LENS IMPLANT      COLONOSCOPY      HIP REPLACEMENT ARTHROPLASTY Left     HYSTERECTOMY      Right hand surgery      ROBOTIC ARTHROPLASTY, HIP Right 7/23/2024    Procedure: ROBOTIC ARTHROPLASTY,HIP;  Surgeon: Karri Kenney MD;  Location: [facility];  Service: Orthopedics;  Laterality: Right;  Edward    SPINAL CORD STIMULATOR IMPLANT         Time Tracking:     OT Date of Treatment: 07/24/24  OT Start Time: 1025  OT Stop Time: 1049  OT Total Time (min): 24 min    Billable Minutes:Evaluation 24    7/24/2024

## 2024-07-25 VITALS
HEIGHT: 62 IN | OXYGEN SATURATION: 96 % | SYSTOLIC BLOOD PRESSURE: 171 MMHG | HEART RATE: 81 BPM | TEMPERATURE: 98 F | RESPIRATION RATE: 18 BRPM | BODY MASS INDEX: 31.32 KG/M2 | DIASTOLIC BLOOD PRESSURE: 72 MMHG | WEIGHT: 170.19 LBS

## 2024-07-25 LAB
ANION GAP SERPL CALC-SCNC: 8 MEQ/L
BUN SERPL-MCNC: 22 MG/DL (ref 9.8–20.1)
CALCIUM SERPL-MCNC: 8.2 MG/DL (ref 8.4–10.2)
CHLORIDE SERPL-SCNC: 113 MMOL/L (ref 98–107)
CO2 SERPL-SCNC: 21 MMOL/L (ref 23–31)
CREAT SERPL-MCNC: 1.12 MG/DL (ref 0.55–1.02)
CREAT/UREA NIT SERPL: 20
ERYTHROCYTE [DISTWIDTH] IN BLOOD BY AUTOMATED COUNT: 14.4 % (ref 11.5–17)
GFR SERPLBLD CREATININE-BSD FMLA CKD-EPI: 49 ML/MIN/1.73/M2
GLUCOSE SERPL-MCNC: 126 MG/DL (ref 82–115)
HCT VFR BLD AUTO: 31.2 % (ref 37–47)
HGB BLD-MCNC: 10.1 G/DL (ref 12–16)
MCH RBC QN AUTO: 31.7 PG (ref 27–31)
MCHC RBC AUTO-ENTMCNC: 32.4 G/DL (ref 33–36)
MCV RBC AUTO: 97.8 FL (ref 80–94)
NRBC BLD AUTO-RTO: 0 %
PLATELET # BLD AUTO: 162 X10(3)/MCL (ref 130–400)
PMV BLD AUTO: 10.7 FL (ref 7.4–10.4)
POCT GLUCOSE: 103 MG/DL (ref 70–110)
POCT GLUCOSE: 127 MG/DL (ref 70–110)
POTASSIUM SERPL-SCNC: 4.5 MMOL/L (ref 3.5–5.1)
RBC # BLD AUTO: 3.19 X10(6)/MCL (ref 4.2–5.4)
SODIUM SERPL-SCNC: 142 MMOL/L (ref 136–145)
WBC # BLD AUTO: 7.6 X10(3)/MCL (ref 4.5–11.5)

## 2024-07-25 PROCEDURE — 97535 SELF CARE MNGMENT TRAINING: CPT

## 2024-07-25 PROCEDURE — 94761 N-INVAS EAR/PLS OXIMETRY MLT: CPT

## 2024-07-25 PROCEDURE — 25000003 PHARM REV CODE 250: Performed by: NURSE PRACTITIONER

## 2024-07-25 PROCEDURE — 25000242 PHARM REV CODE 250 ALT 637 W/ HCPCS: Performed by: INTERNAL MEDICINE

## 2024-07-25 PROCEDURE — 36415 COLL VENOUS BLD VENIPUNCTURE: CPT | Performed by: SPECIALIST

## 2024-07-25 PROCEDURE — 25000003 PHARM REV CODE 250: Performed by: SPECIALIST

## 2024-07-25 PROCEDURE — 97530 THERAPEUTIC ACTIVITIES: CPT

## 2024-07-25 PROCEDURE — 25000003 PHARM REV CODE 250: Performed by: INTERNAL MEDICINE

## 2024-07-25 PROCEDURE — 97116 GAIT TRAINING THERAPY: CPT

## 2024-07-25 PROCEDURE — 85027 COMPLETE CBC AUTOMATED: CPT | Performed by: SPECIALIST

## 2024-07-25 PROCEDURE — 94799 UNLISTED PULMONARY SVC/PX: CPT

## 2024-07-25 PROCEDURE — 80048 BASIC METABOLIC PNL TOTAL CA: CPT | Performed by: SPECIALIST

## 2024-07-25 PROCEDURE — G0378 HOSPITAL OBSERVATION PER HR: HCPCS

## 2024-07-25 PROCEDURE — 99900031 HC PATIENT EDUCATION (STAT)

## 2024-07-25 RX ORDER — FLUTICASONE PROPIONATE 50 MCG
2 SPRAY, SUSPENSION (ML) NASAL 2 TIMES DAILY
Status: DISCONTINUED | OUTPATIENT
Start: 2024-07-25 | End: 2024-07-25 | Stop reason: HOSPADM

## 2024-07-25 RX ORDER — GUAIFENESIN 600 MG/1
600 TABLET, EXTENDED RELEASE ORAL 2 TIMES DAILY
Status: DISCONTINUED | OUTPATIENT
Start: 2024-07-25 | End: 2024-07-25 | Stop reason: HOSPADM

## 2024-07-25 RX ORDER — CETIRIZINE HYDROCHLORIDE 10 MG/1
10 TABLET ORAL ONCE
Status: COMPLETED | OUTPATIENT
Start: 2024-07-25 | End: 2024-07-25

## 2024-07-25 RX ADMIN — FAMOTIDINE 20 MG: 20 TABLET, FILM COATED ORAL at 09:07

## 2024-07-25 RX ADMIN — FLUTICASONE PROPIONATE 100 MCG: 50 SPRAY, METERED NASAL at 11:07

## 2024-07-25 RX ADMIN — TRAMADOL HYDROCHLORIDE 50 MG: 50 TABLET, COATED ORAL at 01:07

## 2024-07-25 RX ADMIN — ACETAMINOPHEN 500 MG: 500 TABLET ORAL at 01:07

## 2024-07-25 RX ADMIN — GUAIFENESIN 600 MG: 600 TABLET, EXTENDED RELEASE ORAL at 11:07

## 2024-07-25 RX ADMIN — TRAMADOL HYDROCHLORIDE 50 MG: 50 TABLET, COATED ORAL at 09:07

## 2024-07-25 RX ADMIN — ACETAMINOPHEN 500 MG: 500 TABLET ORAL at 11:07

## 2024-07-25 RX ADMIN — FOLIC ACID 1 MG: 1 TABLET ORAL at 09:07

## 2024-07-25 RX ADMIN — DOCUSATE SODIUM 200 MG: 100 CAPSULE, LIQUID FILLED ORAL at 09:07

## 2024-07-25 RX ADMIN — ALLOPURINOL 100 MG: 100 TABLET ORAL at 09:07

## 2024-07-25 RX ADMIN — LEVOTHYROXINE SODIUM 50 MCG: 50 TABLET ORAL at 06:07

## 2024-07-25 RX ADMIN — CETIRIZINE HYDROCHLORIDE 10 MG: 10 TABLET, FILM COATED ORAL at 12:07

## 2024-07-25 RX ADMIN — METHOCARBAMOL 500 MG: 500 TABLET ORAL at 11:07

## 2024-07-25 RX ADMIN — ASPIRIN 81 MG 81 MG: 81 TABLET ORAL at 09:07

## 2024-07-25 RX ADMIN — LOSARTAN POTASSIUM 100 MG: 50 TABLET, FILM COATED ORAL at 09:07

## 2024-07-25 RX ADMIN — ACETAMINOPHEN 500 MG: 500 TABLET ORAL at 06:07

## 2024-07-25 RX ADMIN — SENNOSIDES AND DOCUSATE SODIUM 2 TABLET: 50; 8.6 TABLET ORAL at 09:07

## 2024-07-25 NOTE — PT/OT/SLP PROGRESS
Occupational Therapy   Treatment    Name: Ana Appiah  MRN: [id number]  Admitting Diagnosis:  Primary osteoarthritis of right hip  2 Days Post-Op    Recommendations:     Discharge Recommendations: Low Intensity Therapy  Discharge Equipment Recommendations:  walker, rolling  Barriers to discharge:  None    Assessment:     Ana Appiah is a 84 y.o. female with a medical diagnosis of Primary osteoarthritis of right hip.  Performance deficits affecting function are weakness, pain, decreased lower extremity function, decreased safety awareness, impaired functional mobility, orthopedic precautions, impaired joint extensibility, decreased ROM.     Rehab Prognosis:  Good; patient would benefit from acute skilled OT services to address these deficits and reach maximum level of function.       Plan:     Patient to be seen daily (BID) to address the above listed problems via self-care/home management, therapeutic activities, therapeutic exercises  Plan of Care Expires: 07/30/24  Plan of Care Reviewed with: patient, daughter    Subjective     Chief Complaint: pain  Patient/Family Comments/goals: go home  Pain/Comfort:  Pain Rating 1: 8/10  Location - Side 1: Right  Location - Orientation 1: generalized  Location 1: hip  Pain Addressed 1: Distraction, Reposition  Pain Rating Post-Intervention 1: 10/10    Objective:     Communicated with: NSG prior to session.  Patient found up in chair with telemetry, peripheral IV upon OT entry to room.    General Precautions: Standard, fall    Orthopedic Precautions:RLE partial weight bearing, RLE posterior precautions (75%)  Braces: N/A  Respiratory Status: Room air     Occupational Performance:     Functional Mobility/Transfers:  Patient completed Sit <> Stand Transfer with stand by assistance  with  rolling walker   Functional Mobility: Patient performed FM in hallway with RW and SBA, tolerated ~200 feet with no LOBs. Slower pace due to pain, performed well  though.    Activities of Daily Living:  Upper Body Dressing: independence    Lower Body Dressing: stand by assistance with use of reacher to don pants    Treatment & Education:  Reviewed and encouraged use of AE for LBD to adhere to posterior hip precautions    Patient left up in chair with all lines intact, call button in reach, and daughter present    GOALS:   Multidisciplinary Problems       Occupational Therapy Goals          Problem: Occupational Therapy    Goal Priority Disciplines Outcome Interventions   Occupational Therapy Goal     OT, PT/OT Progressing    Description: Pt will perform LB dressing SB with AE PRN by d/c. MET  Pt will perform toileting SBA by d/c.  Pt will perform toilet t/f SBA by d/c.  Pt will perform car t/f SBA in adherence to pxns by d/c.                        Time Tracking:     OT Date of Treatment: 07/25/24  OT Start Time: 0949  OT Stop Time: 1012  OT Total Time (min): 23 min    Billable Minutes:Self Care/Home Management 10  Therapeutic Activity 13    OT/ROBERT: OT          7/25/2024

## 2024-07-25 NOTE — PROGRESS NOTES
[facility] ORTHOPAEDIC  [facility] Medicine Progress Note      Patient Name: Ana Appiah  MRN: [id number]  Admission Date: 7/23/2024   Length of Stay: 0  Attending Physician: Mateo Belcher MD  Primary Care Provider: Candie Clinton MD  Face-to-Face encounter date: 07/25/2024    Code Status: Not on file        Chief Complaint:   Right hip pain        HPI:   Ana Appiah is a 84 y.o. female who  has a past medical history of Arthritis, Cancer, Coronary artery disease, Diabetes mellitus, Digestive disorder, Gout, unspecified, Hypertension, Renal disorder, and Thyroid disease.. The patient presented to [facility] on 7/23/2024 with a primary complaint of OA right hip. She underwent right RADHA today. I was consulted for medical management. The pt was a little drowsy but was able to provide history. Her daughter is also present. She reports having ongoing hip pain that led to her surgery. She's had mild nausea post op. Denies cp, dyspnea, fever, chills or sweats. She has no c/o.     Overview/Hospital Course:  No notes on file       Interval Hx:   +cough dry poor sleep, no f/c/sob    Review of Systems   All other systems reviewed and are negative.      Objective/physical exam:  General: In no acute distress, afebrile  Chest: Clear to auscultation bilaterally  Heart: RRR, +S1, S2, no appreciable murmur  Abdomen: Soft, nontender, BS +  MSK: right hip surgery  Neurologic: Alert and oriented x4,      VITAL SIGNS: 24 HRS MIN & MAX LAST   Temp  Min: 97.5 °F (36.4 °C)  Max: 99.1 °F (37.3 °C) 98.3 °F (36.8 °C)   BP  Min: 112/63  Max: 167/84 (!) 165/70   Pulse  Min: 77  Max: 83  78   Resp  Min: 18  Max: 18 18   SpO2  Min: 92 %  Max: 97 % (!) 92 %       Recent Labs   Lab 07/23/24  1342 07/24/24  0407 07/25/24  0434   WBC  --  9.05 7.60   RBC  --  3.77* 3.19*   HGB 11.3* 11.5* 10.1*   HCT 33.9* 36.3* 31.2*   MCV  --  96.3* 97.8*   MCH  --  30.5 31.7*   MCHC  --  31.7* 32.4*    RDW  --  13.9 14.4   PLT  --  185 162   MPV  --  10.5* 10.7*       Recent Labs   Lab 07/24/24  0412 07/25/24  0434    142   K 5.6* 4.5   * 113*   CO2 20* 21*   BUN 21.8* 22.0*   CREATININE 1.30* 1.12*   CALCIUM 7.7* 8.2*        Microbiology Results (last 7 days)       ** No results found for the last 168 hours. **             Radiology:  X-Ray Hip 1 View Right (with Pelvis when performed)  Narrative: EXAMINATION:  XR HIP WITH PELVIS WHEN PERFORMED, 1 VIEW RIGHT    CLINICAL HISTORY:  Post-op;    COMPARISON:  29 May 2024    FINDINGS:  Single frontal image of the right hip demonstrates expected changes of recent total hip arthroplasty.  Impression: As above    Electronically signed by: Aydin Ruiz  Date:    07/23/2024  Time:    14:12      Scheduled Med:   acetaminophen  500 mg Oral Q4H    allopurinoL  100 mg Oral Daily    aspirin  81 mg Oral BID    [START ON 7/26/2024] bisacodyL  10 mg Rectal Daily    docusate sodium  200 mg Oral Daily    famotidine  20 mg Oral Daily    folic acid  1 mg Oral Daily    levothyroxine  50 mcg Oral Before breakfast    losartan  100 mg Oral Daily    metFORMIN  500 mg Oral Daily with dinner    polyethylene glycol  17 g Oral QHS    senna-docusate 8.6-50 mg  2 tablet Oral BID        Continuous Infusions:   0.9% NaCl   Intravenous Continuous 75 mL/hr at 07/24/24 1946 New Bag at 07/24/24 1946        PRN Meds:    Current Facility-Administered Medications:     aluminum-magnesium hydroxide-simethicone, 30 mL, Oral, Q6H PRN    dextrose 10%, 12.5 g, Intravenous, PRN    dextrose 10%, 25 g, Intravenous, PRN    glucagon (human recombinant), 1 mg, Intramuscular, PRN    glucose, 16 g, Oral, PRN    glucose, 24 g, Oral, PRN    HYDROcodone-acetaminophen, 1 tablet, Oral, Q8H PRN    insulin aspart U-100, 0-5 Units, Subcutaneous, QID (AC + HS) PRN    lactulose, 20 g, Oral, Q6H PRN    melatonin, 6 mg, Oral, Nightly PRN    methocarbamoL, 500 mg, Oral, Q8H PRN    ondansetron, 4 mg, Intravenous, Q6H  PRN    traMADoL, 50 mg, Oral, Q4H PRN     Nutrition Status:      Assessment/Plan:  Right hip osteoarthritis s/p right RADHA 7/23  DM 2 A1c 5.9  HTN  Ckd   Hyperkalemia     Plan    Flonase/mucinex   Labs reviewed  low dose insulin sliding scale/home meds resumed  Postop care per ortho  Cont therapy  Dc dispo pending            All diagnosis and differential diagnosis have been reviewed; assessment and plan has been documented; I have personally reviewed the labs and test results that are presently available; I have reviewed the patients medication list; I have reviewed the consulting providers response and recommendations. I have reviewed or attempted to review medical records based upon their availability      _____________________________________________________________________            Mateo Belcher MD   07/25/2024

## 2024-07-25 NOTE — PLAN OF CARE
Problem: Occupational Therapy  Goal: Occupational Therapy Goal  Description: Pt will perform LB dressing SB with AE PRN by d/c. MET  Pt will perform toileting SBA by d/c.  Pt will perform toilet t/f SBA by d/c.  Pt will perform car t/f SBA in adherence to pxns by d/c.   Outcome: Progressing

## 2024-07-25 NOTE — PLAN OF CARE
Problem: Adult Inpatient Plan of Care  Goal: Plan of Care Review  Outcome: Progressing  Goal: Absence of Hospital-Acquired Illness or Injury  Outcome: Progressing  Goal: Readiness for Transition of Care  Outcome: Progressing     Problem: Diabetes Comorbidity  Goal: Blood Glucose Level Within Targeted Range  Outcome: Progressing     Problem: Wound  Goal: Optimal Coping  Outcome: Progressing  Goal: Absence of Infection Signs and Symptoms  Outcome: Progressing  Goal: Optimal Pain Control and Function  Outcome: Progressing  Goal: Skin Health and Integrity  Outcome: Progressing

## 2024-07-25 NOTE — PROGRESS NOTES
"No acute events overnight.  Pain controlled.  Resting in bed.    Vital Signs  Temp: 97.8 °F (36.6 °C)  Temp Source: Oral  Pulse: 81  Heart Rate Source: Monitor  Resp: 18  SpO2: 96 %  Pulse Oximetry Type: Intermittent  Flow (L/min) (Oxygen Therapy): 2  Oxygen Concentration (%): 80  Device (Oxygen Therapy): room air  BP: (!) 171/72  BP Location: Right arm  BP Method: Automatic  Patient Position: Lying  Height and Weight  Height: 5' 2" (157.5 cm)  Weight: 77.2 kg (170 lb 3.1 oz)  Weight Method: Standard Scale  BSA (Calculated - sq m): 1.84 sq meters  BMI (Calculated): 31.1  Weight in (lb) to have BMI = 25: 136.4]    +FHL/EHL  Brisk capillary refill distally  Dressing c/d/i  Sensation intact to light touch distally    Recent Lab Results  (Last 5 results in the past 24 hours)        07/25/24  1224   07/25/24  0633   07/25/24  0434   07/24/24  2205   07/24/24  1618        Anion Gap     8.0           BUN     22.0           BUN/CREAT RATIO     20           Calcium     8.2           Chloride     113           CO2     21           Creatinine     1.12           eGFR     49           Glucose     126           Hematocrit     31.2           Hemoglobin     10.1           MCH     31.7           MCHC     32.4           MCV     97.8           MPV     10.7           nRBC     0.0           Platelet Count     162           POCT Glucose 103   127     145   121       Potassium     4.5           RBC     3.19           RDW     14.4           Sodium     142           WBC     7.60                                  A/P:  Status post right total hip arthroplasty  Overall patient doing well.  Therapy for mobility and ambulation.  Aspirin for DVT PPx  Discharge home today  "

## 2024-07-25 NOTE — PT/OT/SLP PROGRESS
Physical Therapy Treatment    Patient Name:  Ana Appiah   MRN:  [id number]    Recommendations:     Discharge Recommendations: Low Intensity Therapy  Discharge Equipment Recommendations: walker, rolling  Barriers to discharge: None    Assessment:     Ana Appiah is a 84 y.o. female admitted with a medical diagnosis of Primary osteoarthritis of right hip.  She presents with the following impairments/functional limitations: weakness, impaired endurance, impaired functional mobility, decreased lower extremity function, pain, decreased ROM, edema, orthopedic precautions .    Rehab Prognosis: Good; patient would benefit from acute skilled PT services to address these deficits and reach maximum level of function.    Recent Surgery: Procedure(s) (LRB):  ROBOTIC ARTHROPLASTY,HIP (Right) 2 Days Post-Op    Plan:     During this hospitalization, patient to be seen BID to address the identified rehab impairments via gait training, therapeutic activities, therapeutic exercises and progress toward the following goals:    Plan of Care Expires:  07/29/24  Pt tolerated session well, educated on safety awareness, proper hydration, HEP, mobility, and pain management strategies to reduce risk of medical complications upon d/c home. All questions/concerns addressed. This note to serve as d/c summary.     Subjective     Chief Complaint: R hip pain  Patient/Family Comments/goals:   Pain/Comfort:  Location - Side 1: Right  Location 1: hip  Pain Addressed 1: Reposition, Distraction      Objective:     Communicated with nurse prior to session.  Patient found up in chair with peripheral IV, telemetry upon PT entry to room.     General Precautions: Standard, fall  Orthopedic Precautions: RLE partial weight bearing, RLE posterior precautions (75% WB)  Braces:    Respiratory Status: Room air     Functional Mobility:  Bed Mobility:     Sit to Supine: contact guard assistance  Transfers:     Sit to Stand:  contact guard assistance  with rolling walker  Gait: Pt ambulated 200 ft w rw and CGA, using step through gait pattern at slow pace.        Treatment & Education:  Pt edu on total hip precautions, partial WB status, and importance of frequent mobility  Pt completed RADHA therex x10 AAROM    Patient left up in chair with all lines intact, call button in reach, nurse notified, and daughter present..    GOALS:   Multidisciplinary Problems       Physical Therapy Goals          Problem: Physical Therapy    Goal Priority Disciplines Outcome Goal Variances Interventions   Physical Therapy Goal     PT, PT/OT Progressing     Description: Pt will improve functional independence by performing:    Bed mobility: SBA   Sit to stand: SBA with rolling walker MET  Bed to chair t/f: SBA with Stand Step  with rolling walker MET  Ambulation x 200'  with SBA with rolling walker  MET  1 Step (Curb): Min A  with rolling walker MET  3 Steps: Min A  with B HR MET  Independent with total hip HEP                        Time Tracking:     PT Received On:    PT Start Time: 1126     PT Stop Time: 1136  PT Total Time (min): 10 min     Billable Minutes: Gait Training 10    Treatment Type: Treatment  PT/PTA: PT     Number of PTA visits since last PT visit: 0     07/25/2024

## 2024-07-25 NOTE — PLAN OF CARE
Problem: Physical Therapy  Goal: Physical Therapy Goal  Description: Pt will improve functional independence by performing:    Bed mobility: SBA   Sit to stand: SBA with rolling walker MET  Bed to chair t/f: SBA with Stand Step  with rolling walker MET  Ambulation x 200'  with SBA with rolling walker  MET  1 Step (Curb): Min A  with rolling walker MET  3 Steps: Min A  with B HR MET  Independent with total hip HEP   Outcome: Progressing

## 2024-07-25 NOTE — PLAN OF CARE
Problem: Adult Inpatient Plan of Care  Goal: Plan of Care Review  Outcome: Met  Goal: Patient-Specific Goal (Individualized)  Outcome: Met  Goal: Absence of Hospital-Acquired Illness or Injury  Outcome: Met  Goal: Optimal Comfort and Wellbeing  Outcome: Met  Goal: Readiness for Transition of Care  Outcome: Met     Problem: Diabetes Comorbidity  Goal: Blood Glucose Level Within Targeted Range  Outcome: Met     Problem: Wound  Goal: Optimal Coping  Outcome: Met  Goal: Optimal Functional Ability  Outcome: Met  Goal: Absence of Infection Signs and Symptoms  Outcome: Met  Goal: Improved Oral Intake  Outcome: Met  Goal: Optimal Pain Control and Function  Outcome: Met  Goal: Skin Health and Integrity  Outcome: Met  Goal: Optimal Wound Healing  Outcome: Met     Problem: Infection  Goal: Absence of Infection Signs and Symptoms  Outcome: Met     Problem: Hip Arthroplasty  Goal: Optimal Coping  Outcome: Met  Goal: Absence of Bleeding  Outcome: Met  Goal: Effective Bowel Elimination  Outcome: Met  Goal: Fluid and Electrolyte Balance  Outcome: Met  Goal: Optimal Functional Ability  Outcome: Met  Goal: Absence of Infection Signs and Symptoms  Outcome: Met  Goal: Intact Neurovascular Status  Outcome: Met  Goal: Anesthesia/Sedation Recovery  Outcome: Met  Goal: Acceptable Pain Control  Outcome: Met  Goal: Nausea and Vomiting Relief  Outcome: Met  Goal: Effective Urinary Elimination  Outcome: Met  Goal: Effective Oxygenation and Ventilation  Outcome: Met     Problem: Fall Injury Risk  Goal: Absence of Fall and Fall-Related Injury  Outcome: Met     Problem: Comorbidity Management  Goal: Maintenance of Asthma Control  Outcome: Met  Goal: Maintenance of Behavioral Health Symptom Control  Outcome: Met  Goal: Maintenance of COPD Symptom Control  Outcome: Met  Goal: Maintenance of Heart Failure Symptom Control  Outcome: Met  Goal: Blood Pressure in Desired Range  Outcome: Met  Goal: Maintenance of Osteoarthritis Symptom  Control  Outcome: Met  Goal: Bariatric Home Regimen Maintained  Outcome: Met  Goal: Maintenance of Seizure Control  Outcome: Met

## 2024-07-26 ENCOUNTER — PATIENT OUTREACH (OUTPATIENT)
Dept: ADMINISTRATIVE | Facility: CLINIC | Age: 84
End: 2024-07-26
Payer: MEDICARE

## 2024-07-26 NOTE — PROGRESS NOTES
C3 nurse spoke with Ana Appiah's daughter, Teresa for a TCC post hospital discharge follow up call. The patient does not have a scheduled HOSFU appointment with Candie Clinton MD within 5-7 days post hospital discharge date 7/25/24. C3 nurse was unable to schedule HOSFU appointment in Epic or route message to PCP.  Daughter advised to call and schedule appt.  The patient does have a POST OP appt with Karri Kenney MD (Orthopedic Surgery) on 8/21/2024;at 8:15am.

## 2024-07-29 LAB — VIEW PATHOLOGY REPORT (RELIAPATH): NORMAL

## 2024-08-01 NOTE — DISCHARGE SUMMARY
[facility]  Discharge Note  Short Stay    Admit Date: 7/23/2024    Discharge Date and Time: 7/25/2024  2:20 PM     Attending Physician: No att. providers found     Discharge Provider: Ney Kenney    Diagnoses:  Active Hospital Problems    Diagnosis  POA    *Primary osteoarthritis of right hip [M16.11]  Yes      Resolved Hospital Problems   No resolved problems to display.       Discharged Condition: good    Hospital Course:   Patient presented for elective robotic-assisted right total hip arthroplasty The patient had no complications during the hospital stay and was discharged home in stable condition partial weightbearing with the assistance of a walker. Consults for physical therapy and rehab were given to the patient as well as a follow-up appointment in our office in 4 weeks for reevaluation. The patient was instructed on wound care and dressing changes as well as to continue CAMPOS hose while at home. Prescriptions were given for continued DVT prophylaxis and pain control. Home medications were resumed please see discharge med rec for these.     Final Diagnoses: Same as principal problem.    Disposition: Home or Self Care    Follow up/Patient Instructions:    Medications:  Reconciled Home Medications:      Medication List        START taking these medications      acetaminophen 500 MG tablet  Commonly known as: TYLENOL  Take 1 tablet (500 mg total) by mouth every 4 (four) hours. for 14 days  Replaces: acetaminophen 650 MG Tbsr     aspirin 81 MG Chew  Take 1 tablet (81 mg total) by mouth 2 (two) times a day. Blood clot prevention     methocarbamoL 500 MG Tab  Commonly known as: ROBAXIN  Take 1 tablet (500 mg total) by mouth every 12 (twelve) hours as needed (Muscle spasms/Thigh Pain).     polyethylene glycol 17 gram Pwpk  Commonly known as: GLYCOLAX  Take 17 g by mouth once daily. Constipation PREVENTION for 14 days            CONTINUE taking these medications      allopurinoL 100 MG  tablet  Commonly known as: ZYLOPRIM  Take 1 tablet by mouth once daily.     amLODIPine 10 MG tablet  Commonly known as: NORVASC  Take 10 mg by mouth nightly.     folic acid 1 MG tablet  Commonly known as: FOLVITE  Take 1 tablet (1 mg total) by mouth once daily.     irbesartan 300 MG tablet  Commonly known as: AVAPRO  Take 300 mg by mouth once daily.     levothyroxine 50 MCG tablet  Commonly known as: SYNTHROID  Take 50 mcg by mouth before breakfast.     metFORMIN 500 MG ER 24hr tablet  Commonly known as: GLUCOPHAGE-XR  Take 500 mg by mouth daily with dinner or evening meal.     methotrexate 2.5 MG Tab  Take 6 tablets qweek. Ok to split dose and take 3 tab in am and 3 tab in pm. Hold for infection or fever.     ondansetron 8 MG Tbdl  Commonly known as: ZOFRAN-ODT  Take 1 tablet (8 mg total) by mouth every 8 (eight) hours as needed (nausea).     pantoprazole 20 MG tablet  Commonly known as: PROTONIX  Take 20 mg by mouth once daily.     pravastatin 10 MG tablet  Commonly known as: PRAVACHOL  Take 10 mg by mouth every evening.     PROLIA 60 mg/mL Syrg  Generic drug: denosumab  Inject 60 mg into the skin every 6 (six) months.            STOP taking these medications      acetaminophen 650 MG Tbsr  Commonly known as: TYLENOL  Replaced by: acetaminophen 500 MG tablet            ASK your doctor about these medications      diclofenac sodium 1 % Gel  Commonly known as: VOLTAREN  Apply 2 g topically 4 (four) times daily.     traMADoL 50 mg tablet  Commonly known as: ULTRAM  Take 0.5-1 tablets (25-50 mg total) by mouth every 6 (six) hours as needed for Pain.  Ask about: Should I take this medication?            No discharge procedures on file.   Follow-up Information       Karri Kenney MD Follow up on 8/21/2024.    Specialty: Orthopedic Surgery  Why: Follow up appointment on Wednesday 8/21/24 at 8:15am with Dr.Yergers Elton PA  Contact information:  [address]  [address] [place] [id number]  [phone]                [facility] Physical Therapy And Follow up on 7/26/2024.    Specialty: Physical Therapy  Why: Your appointment time is 1030, they ask for you to be there at 10:00am.  Contact information:  [address]  [facility] [address] [phone]                             Discharge Procedure Orders (must include Diet, Follow-up, Activity):  No discharge procedures on file.

## 2024-08-07 ENCOUNTER — PATIENT MESSAGE (OUTPATIENT)
Dept: ADMINISTRATIVE | Facility: OTHER | Age: 84
End: 2024-08-07
Payer: MEDICARE

## 2024-08-07 ENCOUNTER — TELEPHONE (OUTPATIENT)
Dept: ORTHOPEDICS | Facility: CLINIC | Age: 84
End: 2024-08-07
Payer: MEDICARE

## 2024-08-07 DIAGNOSIS — Z96.641 STATUS POST RIGHT HIP REPLACEMENT: Primary | ICD-10-CM

## 2024-08-07 RX ORDER — METHOCARBAMOL 750 MG/1
750 TABLET, FILM COATED ORAL 3 TIMES DAILY
Qty: 21 TABLET | Refills: 0 | Status: SHIPPED | OUTPATIENT
Start: 2024-08-07 | End: 2024-08-14

## 2024-08-07 RX ORDER — TRAMADOL HYDROCHLORIDE 50 MG/1
50 TABLET ORAL EVERY 4 HOURS PRN
Qty: 42 TABLET | Refills: 0 | Status: SHIPPED | OUTPATIENT
Start: 2024-08-07 | End: 2024-08-14

## 2024-08-21 ENCOUNTER — HOSPITAL ENCOUNTER (OUTPATIENT)
Dept: RADIOLOGY | Facility: CLINIC | Age: 84
Discharge: HOME | End: 2024-08-21
Attending: PHYSICIAN ASSISTANT
Payer: MEDICARE

## 2024-08-21 ENCOUNTER — OFFICE VISIT (OUTPATIENT)
Dept: ORTHOPEDICS | Facility: CLINIC | Age: 84
End: 2024-08-21
Payer: MEDICARE

## 2024-08-21 VITALS
WEIGHT: 181 LBS | SYSTOLIC BLOOD PRESSURE: 163 MMHG | BODY MASS INDEX: 33.31 KG/M2 | HEART RATE: 75 BPM | HEIGHT: 62 IN | DIASTOLIC BLOOD PRESSURE: 72 MMHG

## 2024-08-21 DIAGNOSIS — Z96.641 STATUS POST RIGHT HIP REPLACEMENT: Primary | ICD-10-CM

## 2024-08-21 DIAGNOSIS — Z96.641 STATUS POST RIGHT HIP REPLACEMENT: ICD-10-CM

## 2024-08-21 PROCEDURE — 3288F FALL RISK ASSESSMENT DOCD: CPT | Mod: CPTII,,, | Performed by: PHYSICIAN ASSISTANT

## 2024-08-21 PROCEDURE — 1159F MED LIST DOCD IN RCRD: CPT | Mod: CPTII,,, | Performed by: PHYSICIAN ASSISTANT

## 2024-08-21 PROCEDURE — 3077F SYST BP >= 140 MM HG: CPT | Mod: CPTII,,, | Performed by: PHYSICIAN ASSISTANT

## 2024-08-21 PROCEDURE — 99024 POSTOP FOLLOW-UP VISIT: CPT | Mod: ,,, | Performed by: PHYSICIAN ASSISTANT

## 2024-08-21 PROCEDURE — 1101F PT FALLS ASSESS-DOCD LE1/YR: CPT | Mod: CPTII,,, | Performed by: PHYSICIAN ASSISTANT

## 2024-08-21 PROCEDURE — 1160F RVW MEDS BY RX/DR IN RCRD: CPT | Mod: CPTII,,, | Performed by: PHYSICIAN ASSISTANT

## 2024-08-21 PROCEDURE — 73502 X-RAY EXAM HIP UNI 2-3 VIEWS: CPT | Mod: RT,,, | Performed by: PHYSICIAN ASSISTANT

## 2024-08-21 PROCEDURE — 3078F DIAST BP <80 MM HG: CPT | Mod: CPTII,,, | Performed by: PHYSICIAN ASSISTANT

## 2024-08-21 RX ORDER — HYDROCODONE BITARTRATE AND ACETAMINOPHEN 7.5; 325 MG/1; MG/1
1 TABLET ORAL EVERY 6 HOURS PRN
COMMUNITY
Start: 2024-03-23

## 2024-08-21 RX ORDER — METHOCARBAMOL 750 MG/1
500 TABLET, FILM COATED ORAL 4 TIMES DAILY
COMMUNITY

## 2024-08-21 NOTE — PROGRESS NOTES
Chief Complaint:   Chief Complaint   Patient presents with    Right Hip - Follow-up     Pt states she is doing great. Attending PT 3x a week. Pt reports tenderness around the incision.       History of present illness:    84-year-old female presents office today for a follow-up on her right hip.  She is one-month S/P right total hip arthroplasty.  Overall she is doing well.  She does have some soreness and tenderness around her incision.  She is ambulating with a walker and attending physical therapy    Past Medical History:   Diagnosis Date    Arthritis     Cancer     skin --resolved    Coronary artery disease     Diabetes mellitus     Digestive disorder     Gout, unspecified     Hypertension     Renal disorder     CKD    Thyroid disease        Past Surgical History:   Procedure Laterality Date    CATARACT EXTRACTION W/  INTRAOCULAR LENS IMPLANT      COLONOSCOPY      HIP REPLACEMENT ARTHROPLASTY Left     HYSTERECTOMY      Right hand surgery      ROBOTIC ARTHROPLASTY, HIP Right 7/23/2024    Procedure: ROBOTIC ARTHROPLASTY,HIP;  Surgeon: Karri Kenney MD;  Location: [facility];  Service: Orthopedics;  Laterality: Right;  [facility]    SPINAL CORD STIMULATOR IMPLANT         Current Outpatient Medications   Medication Sig    allopurinoL (ZYLOPRIM) 100 MG tablet Take 1 tablet by mouth once daily.    amLODIPine (NORVASC) 10 MG tablet Take 10 mg by mouth nightly.    aspirin 81 MG Chew Take 1 tablet (81 mg total) by mouth 2 (two) times a day. Blood clot prevention    folic acid (FOLVITE) 1 MG tablet Take 1 tablet (1 mg total) by mouth once daily.    HYDROcodone-acetaminophen (NORCO) 7.5-325 mg per tablet Take 1 tablet by mouth every 6 (six) hours as needed.    irbesartan (AVAPRO) 300 MG tablet Take 300 mg by mouth once daily.    levothyroxine (SYNTHROID) 50 MCG tablet Take 50 mcg by mouth before breakfast.    metFORMIN (GLUCOPHAGE-XR) 500 MG ER 24hr tablet Take 500 mg by mouth daily with dinner or evening meal.    methocarbamoL  (ROBAXIN) 750 MG Tab Take 500 mg by mouth 4 (four) times daily.    methotrexate 2.5 MG Tab Take 6 tablets qweek. Ok to split dose and take 3 tab in am and 3 tab in pm. Hold for infection or fever.    ondansetron (ZOFRAN-ODT) 8 MG TbDL Take 1 tablet (8 mg total) by mouth every 8 (eight) hours as needed (nausea).    PROLIA 60 mg/mL Syrg Inject 60 mg into the skin every 6 (six) months.    diclofenac sodium (VOLTAREN) 1 % Gel Apply 2 g topically 4 (four) times daily. (Patient not taking: Reported on 2024)    pantoprazole (PROTONIX) 20 MG tablet Take 20 mg by mouth once daily.    pravastatin (PRAVACHOL) 10 MG tablet Take 10 mg by mouth every evening.     No current facility-administered medications for this visit.       Review of patient's allergies indicates:   Allergen Reactions    Penicillins      Other reaction(s): SOB, SWELLING AT INJ. SITE       Family History   Problem Relation Name Age of Onset    Diabetes Mellitus Mother      Cancer Sister         Social History     Socioeconomic History    Marital status:    Tobacco Use    Smoking status: Former     Current packs/day: 0.00     Types: Cigarettes     Quit date:      Years since quittin.6     Passive exposure: Never    Smokeless tobacco: Never   Substance and Sexual Activity    Alcohol use: Not Currently     Comment: socially    Drug use: Yes     Types: Marijuana     Comment: Medical marijuana    Sexual activity: Not Currently     Partners: Male     Social Determinants of Health     Financial Resource Strain: Medium Risk (2024)    Overall Financial Resource Strain (CARDIA)     Difficulty of Paying Living Expenses: Somewhat hard   Food Insecurity: No Food Insecurity (2024)    Hunger Vital Sign     Worried About Running Out of Food in the Last Year: Never true     Ran Out of Food in the Last Year: Never true   Transportation Needs: No Transportation Needs (2024)    TRANSPORTATION NEEDS     Transportation : No   Stress: No Stress  "Concern Present (7/24/2024)    [employer] - Occupational Stress Questionnaire     Feeling of Stress : Not at all   Housing Stability: Low Risk  (7/24/2024)    Housing Stability Vital Sign     Unable to Pay for Housing in the Last Year: No     Homeless in the Last Year: No         Review of Systems:    Constitution:   Denies chills, fever, and sweats.  HENT:   Denies headaches or blurry vision.  Cardiovascular:  Denies chest pain or irregular heart beat.  Respiratory:   Denies cough or shortness of breath.  Gastrointestinal:  Denies abdominal pain, nausea, or vomiting.  Musculoskeletal:   Denies muscle cramps.  Neurological:   Denies dizziness or focal weakness.  Psychiatric/Behavior: Normal mental status.  Hematology/Lymph:  Denies bleeding problem or easy bruising/bleeding.  Skin:    Denies rash or suspicious lesions.    Examination:    Vital Signs:    Vitals:    08/21/24 0808   BP: (!) 163/72   Pulse: 75   Weight: 82.1 kg (181 lb)   Height: 5' 2" (1.575 m)       Body mass index is 33.11 kg/m².    Constitution:   Well-developed, well nourished patient in no acute distress.  Neurological:   Alert and oriented x 3 and cooperative to examination.     Psychiatric/Behavior: Normal mental status.  Respiratory:   No shortness of breath.  Nonlabored breathing  Cardiovascular:           Regular rate and rhythm  Eyes:    Extraoccular muscles intact  Skin:    No scars, rash or suspicious lesions.    Physical Exam:     Right Hip    No swelling, redness or increased heat.    Wound healing normal. Surgical incision C/D/I     Motion was normal.     Weakness of the  hip was observed.    Sensation intact distally    Intact pedal pulses    X-Ray Hip:   Complete right hip x-ray with two or more views of the AP and lateral aspects were performed.   Impressions Radiology Test   X-ray of hip was performed showed intact  hip prosthesis        Assessment:     Status post right hip replacement  -     X-Ray Hip " 2 or 3 views Right with Pelvis when performed; Future; Expected date: 08/21/2024  -     Ambulatory referral/consult to Physical/Occupational Therapy; Future; Expected date: 08/28/2024        Plan:      She will be full weight-bearing right lower extremity with the use of her walker and then transition to a cane once comfortable.  Continue with physical therapy and she will follow up in 3 months for repeat evaluation        Follow up in about 3 months (around 11/21/2024).    DISCLAIMER: This note may have been dictated using voice recognition software and may contain grammatical errors.

## 2024-09-17 ENCOUNTER — TELEPHONE (OUTPATIENT)
Dept: RHEUMATOLOGY | Facility: CLINIC | Age: 84
End: 2024-09-17
Payer: MEDICARE

## 2024-09-17 DIAGNOSIS — M06.09 RHEUMATOID ARTHRITIS OF MULTIPLE SITES WITH NEGATIVE RHEUMATOID FACTOR: ICD-10-CM

## 2024-09-17 DIAGNOSIS — Z79.899 HIGH RISK MEDICATION USE: ICD-10-CM

## 2024-09-17 RX ORDER — METHOTREXATE 2.5 MG/1
TABLET ORAL
Qty: 30 TABLET | Refills: 2 | Status: SHIPPED | OUTPATIENT
Start: 2024-09-17

## 2024-09-17 NOTE — TELEPHONE ENCOUNTER
Ms D please let Teresa (her daughter, works in IM) know that I sent over refills on MTX however we do need to repeat lab work, last lab we have is from her surgery and had several abnormal values (K+ and Ca+),not sure if she has had f/u lab but I did not see any so want to repeat for follow up

## 2024-09-17 NOTE — TELEPHONE ENCOUNTER
Spoke with patient daughter Teresa informed her of message. Patient daughter verbalized understanding.            Ms QIANA please let Teresa (her daughter, works in IM) know that I sent over refills on MTX however we do need to repeat lab work, last lab we have is from her surgery and had several abnormal values (K+ and Ca+),not sure if she has had f/u lab but I did not see any so want to repeat for follow up

## 2024-10-18 ENCOUNTER — LAB VISIT (OUTPATIENT)
Dept: LAB | Facility: HOSPITAL | Age: 84
End: 2024-10-18
Attending: NURSE PRACTITIONER
Payer: MEDICARE

## 2024-10-18 ENCOUNTER — TELEPHONE (OUTPATIENT)
Dept: RHEUMATOLOGY | Facility: CLINIC | Age: 84
End: 2024-10-18
Payer: MEDICARE

## 2024-10-18 DIAGNOSIS — M06.09 RHEUMATOID ARTHRITIS OF MULTIPLE SITES WITH NEGATIVE RHEUMATOID FACTOR: ICD-10-CM

## 2024-10-18 DIAGNOSIS — Z79.899 HIGH RISK MEDICATION USE: ICD-10-CM

## 2024-10-18 LAB
ALBUMIN SERPL-MCNC: 3.7 G/DL (ref 3.4–4.8)
ALBUMIN/GLOB SERPL: 1 RATIO (ref 1.1–2)
ALP SERPL-CCNC: 61 UNIT/L (ref 40–150)
ALT SERPL-CCNC: 18 UNIT/L (ref 0–55)
ANION GAP SERPL CALC-SCNC: 11 MEQ/L
AST SERPL-CCNC: 25 UNIT/L (ref 5–34)
BASOPHILS # BLD AUTO: 0.04 X10(3)/MCL
BASOPHILS NFR BLD AUTO: 0.7 %
BILIRUB SERPL-MCNC: 1 MG/DL
BUN SERPL-MCNC: 19.3 MG/DL (ref 9.8–20.1)
CALCIUM SERPL-MCNC: 10.2 MG/DL (ref 8.4–10.2)
CHLORIDE SERPL-SCNC: 105 MMOL/L (ref 98–107)
CO2 SERPL-SCNC: 24 MMOL/L (ref 23–31)
CREAT SERPL-MCNC: 1.23 MG/DL (ref 0.55–1.02)
CREAT/UREA NIT SERPL: 16
EOSINOPHIL # BLD AUTO: 0.21 X10(3)/MCL (ref 0–0.9)
EOSINOPHIL NFR BLD AUTO: 3.6 %
ERYTHROCYTE [DISTWIDTH] IN BLOOD BY AUTOMATED COUNT: 14.3 % (ref 11.5–17)
GFR SERPLBLD CREATININE-BSD FMLA CKD-EPI: 43 ML/MIN/1.73/M2
GLOBULIN SER-MCNC: 3.8 GM/DL (ref 2.4–3.5)
GLUCOSE SERPL-MCNC: 109 MG/DL (ref 82–115)
HCT VFR BLD AUTO: 38.9 % (ref 37–47)
HGB BLD-MCNC: 12.8 G/DL (ref 12–16)
IMM GRANULOCYTES # BLD AUTO: 0.03 X10(3)/MCL (ref 0–0.04)
IMM GRANULOCYTES NFR BLD AUTO: 0.5 %
LYMPHOCYTES # BLD AUTO: 1.1 X10(3)/MCL (ref 0.6–4.6)
LYMPHOCYTES NFR BLD AUTO: 18.6 %
MCH RBC QN AUTO: 30.4 PG (ref 27–31)
MCHC RBC AUTO-ENTMCNC: 32.9 G/DL (ref 33–36)
MCV RBC AUTO: 92.4 FL (ref 80–94)
MONOCYTES # BLD AUTO: 0.41 X10(3)/MCL (ref 0.1–1.3)
MONOCYTES NFR BLD AUTO: 6.9 %
NEUTROPHILS # BLD AUTO: 4.11 X10(3)/MCL (ref 2.1–9.2)
NEUTROPHILS NFR BLD AUTO: 69.7 %
NRBC BLD AUTO-RTO: 0 %
PLATELET # BLD AUTO: 275 X10(3)/MCL (ref 130–400)
PMV BLD AUTO: 9.9 FL (ref 7.4–10.4)
POTASSIUM SERPL-SCNC: 4.2 MMOL/L (ref 3.5–5.1)
PROT SERPL-MCNC: 7.5 GM/DL (ref 5.8–7.6)
RBC # BLD AUTO: 4.21 X10(6)/MCL (ref 4.2–5.4)
SODIUM SERPL-SCNC: 140 MMOL/L (ref 136–145)
WBC # BLD AUTO: 5.9 X10(3)/MCL (ref 4.5–11.5)

## 2024-10-18 PROCEDURE — 80053 COMPREHEN METABOLIC PANEL: CPT

## 2024-10-18 PROCEDURE — 85025 COMPLETE CBC W/AUTO DIFF WBC: CPT

## 2024-10-18 PROCEDURE — 36415 COLL VENOUS BLD VENIPUNCTURE: CPT

## 2024-10-18 NOTE — TELEPHONE ENCOUNTER
Spoke with patient to remind of labs due. Orders in computer for [facility]. Hours of operation M-F 0600-4 p.m. Patient verbalized full understanding and agrees to have labs completed.

## 2024-10-21 ENCOUNTER — TELEPHONE (OUTPATIENT)
Dept: RHEUMATOLOGY | Facility: CLINIC | Age: 84
End: 2024-10-21
Payer: MEDICARE

## 2024-10-23 ENCOUNTER — OFFICE VISIT (OUTPATIENT)
Dept: ORTHOPEDICS | Facility: CLINIC | Age: 84
End: 2024-10-23
Payer: MEDICARE

## 2024-10-23 VITALS — WEIGHT: 177 LBS | BODY MASS INDEX: 32.57 KG/M2 | HEIGHT: 62 IN

## 2024-10-23 DIAGNOSIS — Z96.641 STATUS POST RIGHT HIP REPLACEMENT: Primary | ICD-10-CM

## 2024-10-23 DIAGNOSIS — M70.61 TROCHANTERIC BURSITIS OF RIGHT HIP: ICD-10-CM

## 2024-10-23 PROCEDURE — 99213 OFFICE O/P EST LOW 20 MIN: CPT | Mod: ,,, | Performed by: PHYSICIAN ASSISTANT

## 2024-10-23 PROCEDURE — 1101F PT FALLS ASSESS-DOCD LE1/YR: CPT | Mod: CPTII,,, | Performed by: PHYSICIAN ASSISTANT

## 2024-10-23 PROCEDURE — 1159F MED LIST DOCD IN RCRD: CPT | Mod: CPTII,,, | Performed by: PHYSICIAN ASSISTANT

## 2024-10-23 PROCEDURE — 3288F FALL RISK ASSESSMENT DOCD: CPT | Mod: CPTII,,, | Performed by: PHYSICIAN ASSISTANT

## 2024-10-23 PROCEDURE — 1160F RVW MEDS BY RX/DR IN RCRD: CPT | Mod: CPTII,,, | Performed by: PHYSICIAN ASSISTANT

## 2024-10-23 PROCEDURE — 1125F AMNT PAIN NOTED PAIN PRSNT: CPT | Mod: CPTII,,, | Performed by: PHYSICIAN ASSISTANT

## 2024-10-23 RX ORDER — PREDNISONE 20 MG/1
10 TABLET ORAL DAILY
Qty: 4 TABLET | Refills: 0 | Status: SHIPPED | OUTPATIENT
Start: 2024-10-23 | End: 2024-10-30

## 2024-10-23 RX ORDER — MELOXICAM 7.5 MG/1
7.5 TABLET ORAL DAILY
Qty: 14 TABLET | Refills: 0 | Status: SHIPPED | OUTPATIENT
Start: 2024-10-23 | End: 2024-11-06

## 2024-10-23 NOTE — PROGRESS NOTES
Chief Complaint:   Chief Complaint   Patient presents with    Right Hip - Pain     Pt reports an episode of severe pain last night where she felt something poking/stabbing her. Pain radiates into her thigh and buttock.        History of present illness:    84-year-old female presents office today with a family member regarding her right hip pain.  She is 3 months S/P right total hip arthroplasty.  She was doing very well up until 2-3 weeks ago when she noted pain to the lateral hip.  She believes this started when she was in physical therapy and she was put on the leg press machine.  She denies groin pain.  Her pain does radiate down the lateral thigh.  She gets some burning sensation as well.  She is ambulating with a cane    Past Medical History:   Diagnosis Date    Arthritis     Cancer     skin --resolved    Coronary artery disease     Diabetes mellitus     Digestive disorder     Gout, unspecified     Hypertension     Renal disorder     CKD    Thyroid disease        Past Surgical History:   Procedure Laterality Date    CATARACT EXTRACTION W/  INTRAOCULAR LENS IMPLANT      COLONOSCOPY      HIP REPLACEMENT ARTHROPLASTY Left     HYSTERECTOMY      Right hand surgery      ROBOTIC ARTHROPLASTY, HIP Right 7/23/2024    Procedure: ROBOTIC ARTHROPLASTY,HIP;  Surgeon: Karri Kenney MD;  Location: [facility];  Service: Orthopedics;  Laterality: Right;  [facility]    SPINAL CORD STIMULATOR IMPLANT         Current Outpatient Medications   Medication Sig    allopurinoL (ZYLOPRIM) 100 MG tablet Take 1 tablet by mouth once daily.    amLODIPine (NORVASC) 10 MG tablet Take 10 mg by mouth nightly.    HYDROcodone-acetaminophen (NORCO) 7.5-325 mg per tablet Take 1 tablet by mouth every 6 (six) hours as needed.    irbesartan (AVAPRO) 300 MG tablet Take 300 mg by mouth once daily.    levothyroxine (SYNTHROID) 50 MCG tablet Take 50 mcg by mouth before breakfast.    metFORMIN (GLUCOPHAGE-XR) 500 MG ER 24hr tablet Take 500 mg by mouth daily with  dinner or evening meal.    methocarbamoL (ROBAXIN) 750 MG Tab Take 500 mg by mouth 4 (four) times daily.    methotrexate 2.5 MG Tab Take 6 tablets qweek. Ok to split dose and take 3 tab in am and 3 tab in pm. Hold for infection or fever.    ondansetron (ZOFRAN-ODT) 8 MG TbDL Take 1 tablet (8 mg total) by mouth every 8 (eight) hours as needed (nausea).    PROLIA 60 mg/mL Syrg Inject 60 mg into the skin every 6 (six) months.    aspirin 81 MG Chew Take 1 tablet (81 mg total) by mouth 2 (two) times a day. Blood clot prevention    diclofenac sodium (VOLTAREN) 1 % Gel Apply 2 g topically 4 (four) times daily. (Patient not taking: Reported on 10/23/2024)    folic acid (FOLVITE) 1 MG tablet Take 1 tablet (1 mg total) by mouth once daily.    meloxicam (MOBIC) 7.5 MG tablet Take 1 tablet (7.5 mg total) by mouth once daily. for 14 days    pantoprazole (PROTONIX) 20 MG tablet Take 20 mg by mouth once daily.    pravastatin (PRAVACHOL) 10 MG tablet Take 10 mg by mouth every evening.    predniSONE (DELTASONE) 20 MG tablet Take 0.5 tablets (10 mg total) by mouth once daily. for 7 days     No current facility-administered medications for this visit.       Review of patient's allergies indicates:   Allergen Reactions    Penicillins      Other reaction(s): SOB, SWELLING AT INJ. SITE       Family History   Problem Relation Name Age of Onset    Diabetes Mellitus Mother      Cancer Sister         Social History     Socioeconomic History    Marital status:    Tobacco Use    Smoking status: Former     Current packs/day: 0.00     Types: Cigarettes     Quit date:      Years since quittin.8     Passive exposure: Never    Smokeless tobacco: Never   Substance and Sexual Activity    Alcohol use: Not Currently     Comment: socially    Drug use: Yes     Types: Marijuana     Comment: Medical marijuana    Sexual activity: Not Currently     Partners: Male     Social Drivers of Health     Financial Resource Strain: Medium Risk  "(7/24/2024)    Overall Financial Resource Strain (CARDIA)     Difficulty of Paying Living Expenses: Somewhat hard   Food Insecurity: No Food Insecurity (7/24/2024)    Hunger Vital Sign     Worried About Running Out of Food in the Last Year: Never true     Ran Out of Food in the Last Year: Never true   Transportation Needs: No Transportation Needs (7/24/2024)    TRANSPORTATION NEEDS     Transportation : No   Stress: No Stress Concern Present (7/24/2024)    [employer] - Occupational Stress Questionnaire     Feeling of Stress : Not at all   Housing Stability: Low Risk  (7/24/2024)    Housing Stability Vital Sign     Unable to Pay for Housing in the Last Year: No     Homeless in the Last Year: No         Review of Systems:    Constitution:   Denies chills, fever, and sweats.  HENT:   Denies headaches or blurry vision.  Cardiovascular:  Denies chest pain or irregular heart beat.  Respiratory:   Denies cough or shortness of breath.  Gastrointestinal:  Denies abdominal pain, nausea, or vomiting.  Musculoskeletal:   Denies muscle cramps.  Neurological:   Denies dizziness or focal weakness.  Psychiatric/Behavior: Normal mental status.  Hematology/Lymph:  Denies bleeding problem or easy bruising/bleeding.  Skin:    Denies rash or suspicious lesions.    Examination:    Vital Signs:    Vitals:    10/23/24 1342   Weight: 80.3 kg (177 lb)   Height: 5' 2" (1.575 m)   PainSc:   5       Body mass index is 32.37 kg/m².    Constitution:   Well-developed, well nourished patient in no acute distress.  Neurological:   Alert and oriented x 3 and cooperative to examination.     Psychiatric/Behavior: Normal mental status.  Respiratory:   No shortness of breath.  Nonlabored breathing  Cardiovascular:           Regular rate and rhythm  Eyes:    Extraoccular muscles intact  Skin:    No scars, rash or suspicious lesions.    Physical Exam:     Right Hip     No swelling,erythema  Tender over the scar and trochanteric " bursa   Tenderness of the proximal in mid ITB band   Positive Trendelenburg sign   She does have strength with resisted hip abduction    Well healed scar    No tenderness or instability of the hips was noted. Motion was normal. No weakness observed.        Assessment:     Status post right hip replacement    Trochanteric bursitis of right hip    Other orders  -     predniSONE (DELTASONE) 20 MG tablet; Take 0.5 tablets (10 mg total) by mouth once daily. for 7 days  Dispense: 4 tablet; Refill: 0  -     meloxicam (MOBIC) 7.5 MG tablet; Take 1 tablet (7.5 mg total) by mouth once daily. for 14 days  Dispense: 14 tablet; Refill: 0        Plan:      I have discussed exam findings with the patient today.  I do feel that she has irritated her ITB band likely from using the leg press machine as she was doing great before that.  I will place her on 10 mg of prednisone for one-week.  She is diabetic but her blood sugars are very controlled.  We will follow this up with Mobic 7.5 mg for 2-3 weeks.  She does have an appointment with Dr. Kenney in one-month        No follow-ups on file.    DISCLAIMER: This note may have been dictated using voice recognition software and may contain grammatical errors.

## 2024-10-24 NOTE — PROGRESS NOTES
"  Patient ID: [id number]     Chief Complaint: Rheumatoid arthritis of multiple sites with negative rheuma (Pt having pain in knuckles in both hands dayron. Rt hands )         HPI:     Ana Appiah is a 84 y.o. female here today for a follow up RA visit.      Initially evaluated in 2023 and diagnosed with Seronegative RA, presented with complaints of pain in bilateral hands and left ankle.  She has pain in bilateral hands for more than 5 years but getting worse for the last 1-2 years.  Admits swelling in her knuckles intermittently.  Morning stiffness for more than couple of hours.  Admits DJD of back and intermittent chronic back and hip pain.  History of left hip replacement. Admits history of Gout in left ankle, Allopurinol 100 mg daily was prescribed by her PCP, was out of meds at her last visit but since has resumed. She has a compression fracture of lumbar vertebrae, currently on treatment for Osteoporosis with Prolia.  Prolia was started in June 2023 and is managed by her PCP. History of squamous cell skin cancer on abdomen status post resection.  No other cancers, follows with Dermatology regularly. Recently diagnosed with peptic ulcer, currently taking famotidine and Prilosec.    March 2024: Here today for follow up. Started on MTX at her last visit however just recently started as she wanted clearance from her Cardiologist before she started. She has been on meds x 1 month and tolerating well, continue with joint pain to her hands, knees and feet. She denies any red/warm/swollen joints but states "just hurts". She now has a fracture of her left 5th toe, she was seen by [facility] and was put in post op shoe x 4 weeks and has f/u with PCP- states she rolled over in bed and felt a crack- currently on Prolia injections with PCP. Morning stiffness lasting 30 minutes.  She has been on Allopurinol for Gout and reports not flares.     October 2024: Here today for follow up. She just resumed MTX about " 3-4 weeks ago, she had a hip replacement in 2024 on right so had held her meds. She continues to tolerate the MTX well with no side effects. She denies any red/warm/swollen joints. Denies any morning stiffness. She has been having some pain in her right hip, had to start using her cane again- she has completed PT but plans to resume home exercises to see if this helps- currently taking Prednisone 20 mg po qd x 1 week per her Ortho, thinks possibly IT band inflammed- possibly adding Mobic- has f/u next week if if still bothersome they will plan to do a steroid injection at this time. Overall she reports feeling well, joints have been doing ok at this time, using topicals prn.     Denies any recent illness or hospitalizations since last visit.     PMH: Squamous cell ca (excision  and then again tx with topical ), Peptic Ulcer, CKD, DM, HTN, HLD, Osteoporosis, Hypothyroidism     Dr. Burrows- Dermatology  Dr. Akhtar- Cardiology   Dr. Kel Gomez- GI  Dr. Teixeira- Neurology- spinal stimulator- f/u prn  Dr. Kenney- Ortho    Denies history of fevers, rashes, photosensitivity, oral or nasal ulcers, h/o MI, stroke, seizures, h/o PE or DVT, Raynaud's phenomenon, uveitis.   Family history of autoimmune disease:  None  Smoking:  Former smoker. (Smoked from age 16 to , 1 ppd)- congratulated her on cessation!    Social History     Tobacco Use   Smoking Status Former    Current packs/day: 0.00    Types: Cigarettes    Quit date:     Years since quittin.8    Passive exposure: Never   Smokeless Tobacco Never          -------------------------------------    Arthritis    Cancer    skin --resolved    Coronary artery disease    Diabetes mellitus    Digestive disorder    Gout, unspecified    Hypertension    Renal disorder    CKD    Thyroid disease        Past Surgical History:   Procedure Laterality Date    CATARACT EXTRACTION W/  INTRAOCULAR LENS IMPLANT      COLONOSCOPY      HIP REPLACEMENT ARTHROPLASTY  Left     HYSTERECTOMY      Right hand surgery      ROBOTIC ARTHROPLASTY, HIP Right 7/23/2024    Procedure: ROBOTIC ARTHROPLASTY,HIP;  Surgeon: Karri Kenney MD;  Location: [facility];  Service: Orthopedics;  Laterality: Right;  [facility]    SPINAL CORD STIMULATOR IMPLANT         Review of patient's allergies indicates:   Allergen Reactions    Penicillins      Other reaction(s): SOB, SWELLING AT INJ. SITE       Outpatient Medications Marked as Taking for the 10/25/24 encounter (Office Visit) with Paola Diehl FNP   Medication Sig Dispense Refill    allopurinoL (ZYLOPRIM) 100 MG tablet Take 1 tablet by mouth once daily.      amLODIPine (NORVASC) 10 MG tablet Take 10 mg by mouth nightly.      diclofenac sodium (VOLTAREN) 1 % Gel Apply 2 g topically 4 (four) times daily. (Patient taking differently: Apply 2 g topically 4 (four) times daily as needed.) 100 g 5    folic acid (FOLVITE) 1 MG tablet Take 1 tablet (1 mg total) by mouth once daily. 30 tablet 6    HYDROcodone-acetaminophen (NORCO) 7.5-325 mg per tablet Take 1 tablet by mouth every 6 (six) hours as needed.      irbesartan (AVAPRO) 300 MG tablet Take 300 mg by mouth once daily.      levothyroxine (SYNTHROID) 50 MCG tablet Take 50 mcg by mouth before breakfast.      metFORMIN (GLUCOPHAGE-XR) 500 MG ER 24hr tablet Take 500 mg by mouth daily with dinner or evening meal.      methocarbamoL (ROBAXIN) 750 MG Tab Take 500 mg by mouth 4 (four) times daily as needed.      methotrexate 2.5 MG Tab Take 6 tablets qweek. Ok to split dose and take 3 tab in am and 3 tab in pm. Hold for infection or fever. 30 tablet 2    ondansetron (ZOFRAN-ODT) 8 MG TbDL Take 1 tablet (8 mg total) by mouth every 8 (eight) hours as needed (nausea). 20 tablet 0    pantoprazole (PROTONIX) 20 MG tablet Take 40 mg by mouth once daily.      pravastatin (PRAVACHOL) 10 MG tablet Take 10 mg by mouth every evening.      predniSONE (DELTASONE) 20 MG tablet Take 0.5 tablets (10 mg total) by mouth  once daily. for 7 days 4 tablet 0    PROLIA 60 mg/mL Syrg Inject 60 mg into the skin every 6 (six) months.      sertraline (ZOLOFT) 50 MG tablet Take 50 mg by mouth every evening.         Social History     Socioeconomic History    Marital status:    Tobacco Use    Smoking status: Former     Current packs/day: 0.00     Types: Cigarettes     Quit date:      Years since quittin.8     Passive exposure: Never    Smokeless tobacco: Never   Substance and Sexual Activity    Alcohol use: Not Currently     Comment: socially    Drug use: Yes     Types: Marijuana     Comment: Medical marijuana    Sexual activity: Not Currently     Partners: Male     Social Drivers of Health     Financial Resource Strain: Medium Risk (2024)    Overall Financial Resource Strain (CARDIA)     Difficulty of Paying Living Expenses: Somewhat hard   Food Insecurity: No Food Insecurity (2024)    Hunger Vital Sign     Worried About Running Out of Food in the Last Year: Never true     Ran Out of Food in the Last Year: Never true   Transportation Needs: No Transportation Needs (2024)    TRANSPORTATION NEEDS     Transportation : No   Stress: No Stress Concern Present (2024)    [employer] [place] [employer] - Occupational Stress Questionnaire     Feeling of Stress : Not at all   Housing Stability: Low Risk  (2024)    Housing Stability Vital Sign     Unable to Pay for Housing in the Last Year: No     Homeless in the Last Year: No        Family History   Problem Relation Name Age of Onset    Diabetes Mellitus Mother      Cancer Sister            There is no immunization history on file for this patient.    Patient Care Team:  Candie Clinton MD as PCP - General (Internal Medicine)  Cheri Roth NP (Inactive) (Family Medicine)     Subjective:     Constitutional:  Denies chills. Denies fever. Denies night sweats. Denies weight loss.   Ophthalmology: Denies blurred vision. Admits dry eyes- uses  "gtts prn and helps. Denies eye pain. Denies Itching and redness.   ENT: Denies oral ulcers. Denies epistaxis. Denies dry mouth. Denies swollen glands.   Endocrine: Admits diabetes. Admits thyroid Problems.   Respiratory: Denies cough. Denies shortness of breath. Denies shortness of breath with exertion. Denies hemoptysis.   Cardiovascular: Denies chest pain at rest. Denies chest pain with exertion. Denies palpitations.    Gastrointestinal: Denies abdominal pain. Denies diarrhea. Denies nausea. Denies vomiting. Denies hematemesis or hematochezia. Denies heartburn.  Genitourinary: Denies blood in urine.  Musculoskeletal: See HPI for details  Integumentary: Denies rash. Denies photosensitivity.   Peripheral Vascular: Denies Ulcers of hands and/or feet. Denies Cold extremities.   Neurologic: Denies dizziness. Denies headache.  Denies loss of strength. Denies numbness or tingling.   Psychiatric: Denies depression. Denies anxiety. Denies suicidal/homicidal ideations.      Objective:     BP (!) 141/84 (BP Location: Right arm, Patient Position: Sitting)   Pulse 66   Temp 98.3 °F (36.8 °C) (Oral)   Resp 20   Ht 5' 2" (1.575 m)   Wt 80.2 kg (176 lb 12.8 oz)   SpO2 97%   BMI 32.34 kg/m²     Physical Exam    General Appearance: alert, pleasant, in no acute distress.  Skin: Skin color, texture, turgor normal. No rashes or lesions. Multiple moles and actinic keratosis on the body.  Eyes:  extraocular movement intact (EOMI), pupils equal, round, reactive to light and accommodation, conjunctiva clear.  ENT: No oral or nasal ulcers.  Neck:  Neck supple. No adenopathy.   Lungs: CTA throughout without crackles, rhonchi, or wheezes.   Heart: RRR w/o murmurs.  No edema  Neuro: Alert, oriented, CN II-XII GI, sensory and motor innervation intact.  Musculoskeletal:  No tenderness or swelling to bilateral MCPs noted, mild tenderness to left 3rd PIP.  DJD changes bilaterally.  Heberden's nodes bilaterally.  Squaring of bilateral CMC " joints.  Decreased range of bilateral shoulders.  No tenderness to bilateral ankles today. No tenderness to MTPs bilaterally, overlap of 2nd right MTD- no tenderness no broken skin noted- enc use of corn pads to help prevent  Decreased range of motion of spine.  (On initial exam)  Psych: Alert, oriented, normal eye contact.      Labs:   1/10/23:  CBC okay.  Creatinine 1.23, GFR 44.  CMP okay otherwise.  Intermittent elevation of creatinine since 2019.  Creatinine stable.  Rheumatoid factor negative.    07/19/2023: creatinine 1.16, better than before, GFR 47.  CMP okay.  Uric acid 6.9.  CBC okay.  Negative hepatitis-B, hepatitis-C, HIV, QuantiFERON TB.  CRP, ESR normal.    3/8/2024 CMP creat 1.19, GFR 45- stable, Uric Acid 7.1-fax to PCP, CBC ok. Sed Rate 9 (<20), CRP 1 (<5)    07/05/2024 A1c 5.9     07/25/2024 CBC hemoglobin 10.1, hematocrit 31.2, BMP creatinine 1.12, a calcium 8.2, GFR 49-stable, 7/24/2024 BMP K+ 5.6  - labs post hip surgery     10/18/2024 CMP creatinine 1.23, GFR 43 stable, CBC okay    Imaging:   CT pelvis March 2023 showed DJD of lumbar spine, right hip, asymmetric appearance of DJD at sacroiliac joints.  Left hip arthroplasty in good position.  Diverticulosis present.  3/2023:  CT lumbar spine showed degenerative disc disease and spondylosis of lumbar spine.  L2 and L3 chronic compression deformities, L5/S1 spondylolisthesis.    7/20/23: CXR normal.     7/21/23:  Normal x-ray of bilateral feet.  DJD changes in bilateral hands.  Marginal periarticular erosive changes in the IP joints bilaterally.    3/6/2024 Left Foot Xray: Fifth proximal phalanx fracture.     Assessment:       ICD-10-CM ICD-9-CM   1. Rheumatoid arthritis of multiple sites with negative rheumatoid factor  M06.09 714.0   2. High risk medication use  Z79.899 V58.69   3. Drug-induced immunodeficiency  D84.821 279.3    Z79.899 E947.9   4. Primary osteoarthritis involving multiple joints  M15.0 715.98   5. Primary hypertension  I10  401.9   6. Borderline diabetes  R73.03 790.29   7. Hyperlipidemia, unspecified hyperlipidemia type  E78.5 272.4   8. Age-related osteoporosis without current pathological fracture  M81.0 733.01   9. Chronic gout of left ankle, unspecified cause  M1A.0720 274.02         Plan:     1. Rheumatoid arthritis of multiple sites with negative rheumatoid factor  Rheumatoid factor negative.  Synovitis in bilateral MCPs and MTPs on initial exam.  She has mild Rheumatoid Arthritis. She was started on MTX 15 mg po qd with folic acid in July 2024. Today on exam, no active synovitis noted.   - Continue MTX 15 mg po qweek with folic acid 1 mg po qd-will continue at 15 mg po qd due to CKD.   - Continue Diclofenac topically as directed - if desired we can consider compounded topical in the future.   - Enc use of arthritis gloves at night  -  No labs today as she just had last week- CBC and CMP reviewed   - Infection w/u negative 7/2023, repeat with follow up lab  - Cxray ok July 2023     2. High risk medication use/Drug Induced deficiency  - Persons with rheumatoid arthritis, lupus, psoriatic arthritis and other autoimmune diseases are at increased risk of cardiovascular disease including heart attack and stroke. We recommend that all patients with these conditions have annual health maintenance exams including lipid measurements, blood pressure measurements, and smoking cessation counseling when applicable at their primary care provider's office.   - Completed Mammogram and Pap screenings, defers further Mammograms, Colonoscopy 10/2023 UTD.   - Advised to stay up-to-date on age appropriate vaccinations and malignancy screening, including yearly skin exams.  Continue to follow up with Dermatology for skin checks.   - Refuses all vaccinations.     3. Osteoarthritis  - Osteoarthritis of bilateral hands, generalized Osteoarthritis.  History of left hip replacement.  DJD of spine.  Advised to take Tylenol as needed for joint pain.  Okay  to use Diclofenac topical cream 3 to 4 times a day for hand Osteoarthritis.     4. Primary HTN, HLD and borderline DM  -Current stable and at goal today, continue to follow up with PCP.  Encourage low-sodium diet  -Last A1C 5.9  July 2024, encouraged diet modifications of sugar/carb/starch intake     5. Osteoporosis  - Compression fracture of lumbar vertebrae.  She was started on Prolia in June 2023 by her PCP.  PCP managing treatment of Osteoporosis.  - Continue Ca+ Vitamin D with weight bearing exercises as tolerated.   - 3/6/2024 Now with Left Foot Xray: Fifth proximal phalanx fracture. Reports healed well with no issues.      6. Idiopathic chronic gout of multiple sites without tophus  - She is on Allopurinol 100 mg daily.    - Uric Acid 7.1 March 2024- followed by PCP         No follow-ups on file. In addition to their scheduled follow up, the patient has also been instructed to follow up on as needed basis.     Total time spent with patient and documentation is 30 minutes. All questions were answered to patient's satisfaction and patient verbalized understanding. This includes face to face time and non-face to face time preparing to see the patient (eg, review of tests), obtaining and/or reviewing separately obtained history, documenting clinical information in the electronic or other health record, independently interpreting results and communicating results to the patient/family/caregiver, or care coordinator.

## 2024-10-25 ENCOUNTER — OFFICE VISIT (OUTPATIENT)
Dept: RHEUMATOLOGY | Facility: CLINIC | Age: 84
End: 2024-10-25
Payer: MEDICARE

## 2024-10-25 VITALS
RESPIRATION RATE: 20 BRPM | HEIGHT: 62 IN | WEIGHT: 176.81 LBS | BODY MASS INDEX: 32.54 KG/M2 | OXYGEN SATURATION: 97 % | TEMPERATURE: 98 F | HEART RATE: 66 BPM | DIASTOLIC BLOOD PRESSURE: 84 MMHG | SYSTOLIC BLOOD PRESSURE: 141 MMHG

## 2024-10-25 DIAGNOSIS — I10 PRIMARY HYPERTENSION: ICD-10-CM

## 2024-10-25 DIAGNOSIS — M81.0 AGE-RELATED OSTEOPOROSIS WITHOUT CURRENT PATHOLOGICAL FRACTURE: ICD-10-CM

## 2024-10-25 DIAGNOSIS — M06.09 RHEUMATOID ARTHRITIS OF MULTIPLE SITES WITH NEGATIVE RHEUMATOID FACTOR: Primary | ICD-10-CM

## 2024-10-25 DIAGNOSIS — D84.821 DRUG-INDUCED IMMUNODEFICIENCY: ICD-10-CM

## 2024-10-25 DIAGNOSIS — Z79.899 DRUG-INDUCED IMMUNODEFICIENCY: ICD-10-CM

## 2024-10-25 DIAGNOSIS — M1A.0720 CHRONIC GOUT OF LEFT ANKLE, UNSPECIFIED CAUSE: ICD-10-CM

## 2024-10-25 DIAGNOSIS — R73.03 BORDERLINE DIABETES: ICD-10-CM

## 2024-10-25 DIAGNOSIS — Z79.899 HIGH RISK MEDICATION USE: ICD-10-CM

## 2024-10-25 DIAGNOSIS — M15.0 PRIMARY OSTEOARTHRITIS INVOLVING MULTIPLE JOINTS: ICD-10-CM

## 2024-10-25 DIAGNOSIS — E78.5 HYPERLIPIDEMIA, UNSPECIFIED HYPERLIPIDEMIA TYPE: ICD-10-CM

## 2024-10-25 PROCEDURE — 99213 OFFICE O/P EST LOW 20 MIN: CPT | Mod: PBBFAC | Performed by: NURSE PRACTITIONER

## 2024-10-25 RX ORDER — FOLIC ACID 1 MG/1
1 TABLET ORAL DAILY
Qty: 30 TABLET | Refills: 6 | Status: SHIPPED | OUTPATIENT
Start: 2024-10-25 | End: 2025-04-23

## 2024-10-25 RX ORDER — METHOTREXATE 2.5 MG/1
TABLET ORAL
Qty: 30 TABLET | Refills: 2 | Status: SHIPPED | OUTPATIENT
Start: 2024-10-25

## 2024-10-25 RX ORDER — SERTRALINE HYDROCHLORIDE 50 MG/1
50 TABLET, FILM COATED ORAL NIGHTLY
COMMUNITY

## 2024-11-27 ENCOUNTER — OFFICE VISIT (OUTPATIENT)
Dept: ORTHOPEDICS | Facility: CLINIC | Age: 84
End: 2024-11-27
Payer: MEDICARE

## 2024-11-27 VITALS
HEIGHT: 62 IN | HEART RATE: 77 BPM | DIASTOLIC BLOOD PRESSURE: 87 MMHG | WEIGHT: 177 LBS | SYSTOLIC BLOOD PRESSURE: 157 MMHG | BODY MASS INDEX: 32.57 KG/M2

## 2024-11-27 DIAGNOSIS — Z96.641 STATUS POST RIGHT HIP REPLACEMENT: Primary | ICD-10-CM

## 2024-11-27 PROCEDURE — 1101F PT FALLS ASSESS-DOCD LE1/YR: CPT | Mod: CPTII,,, | Performed by: SPECIALIST

## 2024-11-27 PROCEDURE — 1159F MED LIST DOCD IN RCRD: CPT | Mod: CPTII,,, | Performed by: SPECIALIST

## 2024-11-27 PROCEDURE — 3288F FALL RISK ASSESSMENT DOCD: CPT | Mod: CPTII,,, | Performed by: SPECIALIST

## 2024-11-27 PROCEDURE — 3079F DIAST BP 80-89 MM HG: CPT | Mod: CPTII,,, | Performed by: SPECIALIST

## 2024-11-27 PROCEDURE — 3077F SYST BP >= 140 MM HG: CPT | Mod: CPTII,,, | Performed by: SPECIALIST

## 2024-11-27 PROCEDURE — 99213 OFFICE O/P EST LOW 20 MIN: CPT | Mod: ,,, | Performed by: SPECIALIST

## 2024-11-27 NOTE — TELEPHONE ENCOUNTER
Palliative Outpatient Assessment of Need    LSW completed an assessment of need which was completed with (patient, family, or both) in the office or via phone/video conference    Relationship status:   Duration of relationship: 36 years  Name of significant other: Cody  Children and Ages: 1 dtr-Mary  Pets: 2 dogs, 1 cat  Other important family information: Dtr is local  Living situation (where and whom): Resides with spouse    Patient's primary caregiver: Self   Any limitations of caregiver:  Environmental concerns or barriers: Pt reports increased challenges with managing stairs in the home   history: None  Employment history/source of income: [employer] up until last month   Disability: Considering applying for SSD. SSD resource pkt provided    Concerns regarding literacy: None   Spirituality/ Alevism: None   Patient's strengths, social supports, and resources: Supportive family and friends  Cultural information:   Mental Health current or previous: None  Substance use or history: Former Smoker   Sleep: Interrupted due to pain; Has to sleep in a seated position  Exercise: Utilizes w/c primarily  Diet/nutrition: Lack of appetite  Durable Medical Equipment needs: RW; W/c; 3-in-1 commode  Transportation: Family transports; Reviewed [employer] if needed in the future  Financial concerns: None  Advanced Directive: None  Other medical or social work providers involved: Oncology; Surg Onc; Vasc Sx  Patient/caregiver current level of coping: Pt appears to be coping well emotionally at this time   Understanding: Pt appears understanding of current medical status  Patient/family concerns and areas of need: Pain; Lack of appetite; Upcoming CA tx  Patient's interests: Watching TV    I have spent 45 minutes with Patient and family today in which greater than 50% of this time was spent in counseling/coordination of care.    *All questions may not be answered due to 
Patient called asking when she was supposed to start taking a baby asprin before sx because she thought [facility] told her to start one before sx.    I informed her that normally they are to STOP taking asprin before sx so I confirmed with [facility].    He informed me that patient should STOP taking asprin a week before sx but will use aspirin postoperatively for DVT prophylaxis.     I informed patient of this information she stated that she must have misunderstood but will stop taking asprin a week before sx. I also informed her of the paper in her pre op packet that she received that they have a list of common meds and when to stop prior to sx that she can refer to as well.       Patient stated a clear and verbal understanding and will stop Asprin 1 week prior to sx and will call with any other questions or concerns.   
constraints.  Follow-up discussions may need to occur       
[id number]:1-3 Days|| ||00\01||False;

## 2024-11-27 NOTE — PROGRESS NOTES
Past Medical History:   Diagnosis Date    Arthritis     Cancer     skin --resolved    Coronary artery disease     Diabetes mellitus     Digestive disorder     Gout, unspecified     Hypertension     Renal disorder     CKD    Thyroid disease        Past Surgical History:   Procedure Laterality Date    CATARACT EXTRACTION W/  INTRAOCULAR LENS IMPLANT      COLONOSCOPY      HIP REPLACEMENT ARTHROPLASTY Left     HYSTERECTOMY      Right hand surgery      ROBOTIC ARTHROPLASTY, HIP Right 7/23/2024    Procedure: ROBOTIC ARTHROPLASTY,HIP;  Surgeon: Karri Kenney MD;  Location: [facility];  Service: Orthopedics;  Laterality: Right;  [facility]    SPINAL CORD STIMULATOR IMPLANT         Current Outpatient Medications   Medication Sig    allopurinoL (ZYLOPRIM) 100 MG tablet Take 1 tablet by mouth once daily.    amLODIPine (NORVASC) 10 MG tablet Take 10 mg by mouth nightly.    diclofenac sodium (VOLTAREN) 1 % Gel Apply 2 g topically 4 (four) times daily. (Patient taking differently: Apply 2 g topically 4 (four) times daily as needed.)    folic acid (FOLVITE) 1 MG tablet Take 1 tablet (1 mg total) by mouth once daily.    HYDROcodone-acetaminophen (NORCO) 7.5-325 mg per tablet Take 1 tablet by mouth every 6 (six) hours as needed.    irbesartan (AVAPRO) 300 MG tablet Take 300 mg by mouth once daily.    levothyroxine (SYNTHROID) 50 MCG tablet Take 50 mcg by mouth before breakfast.    metFORMIN (GLUCOPHAGE-XR) 500 MG ER 24hr tablet Take 500 mg by mouth daily with dinner or evening meal.    methocarbamoL (ROBAXIN) 750 MG Tab Take 500 mg by mouth 4 (four) times daily as needed.    methotrexate 2.5 MG Tab Take 6 tablets qweek. Ok to split dose and take 3 tab in am and 3 tab in pm. Hold for infection or fever.    ondansetron (ZOFRAN-ODT) 8 MG TbDL Take 1 tablet (8 mg total) by mouth every 8 (eight) hours as needed (nausea).    PROLIA 60 mg/mL Syrg Inject 60 mg into the skin every 6 (six) months.    sertraline (ZOLOFT) 50 MG tablet Take 50 mg by  mouth every evening.    pantoprazole (PROTONIX) 20 MG tablet Take 40 mg by mouth once daily.    pravastatin (PRAVACHOL) 10 MG tablet Take 10 mg by mouth every evening.     No current facility-administered medications for this visit.       Review of patient's allergies indicates:   Allergen Reactions    Penicillins      Other reaction(s): SOB, SWELLING AT INJ. SITE       Family History   Problem Relation Name Age of Onset    Diabetes Mellitus Mother      Cancer Sister         Social History     Socioeconomic History    Marital status:    Tobacco Use    Smoking status: Former     Current packs/day: 0.00     Types: Cigarettes     Quit date:      Years since quittin.9     Passive exposure: Never    Smokeless tobacco: Never   Substance and Sexual Activity    Alcohol use: Not Currently     Comment: socially    Drug use: Yes     Types: Marijuana     Comment: Medical marijuana    Sexual activity: Not Currently     Partners: Male     Social Drivers of Health     Financial Resource Strain: Medium Risk (2024)    Overall Financial Resource Strain (CARDIA)     Difficulty of Paying Living Expenses: Somewhat hard   Food Insecurity: No Food Insecurity (2024)    Hunger Vital Sign     Worried About Running Out of Food in the Last Year: Never true     Ran Out of Food in the Last Year: Never true   Transportation Needs: No Transportation Needs (2024)    TRANSPORTATION NEEDS     Transportation : No   Stress: No Stress Concern Present (2024)    [employer] [place] [employer] - Occupational Stress Questionnaire     Feeling of Stress : Not at all   Housing Stability: Low Risk  (2024)    Housing Stability Vital Sign     Unable to Pay for Housing in the Last Year: No     Homeless in the Last Year: No       Chief Complaint:   Chief Complaint   Patient presents with    Right Hip - Follow-up     Pt states she is doing much better now. Denies any pain or discomfort. No complaints.   "      Consulting Physician: No ref. provider found    History of present illness:    This is a 84 y.o. year old female who is doing well with no pain in her right hip 3 months postop from right total hip arthroplasty.  She is doing well and has no complaints.    Review of Systems:    Constitution:   Denies chills, fever, and sweats.  HENT:   Denies headaches or blurry vision.  Cardiovascular:  Denies chest pain or irregular heart beat.  Respiratory:   Denies cough or shortness of breath.  Gastrointestinal:  Denies abdominal pain, nausea, or vomiting.  Musculoskeletal:   Denies muscle cramps.  Neurological:   Denies dizziness or focal weakness.  Psychiatric/Behavior: Normal mental status.  Hematology/Lymph:  Denies bleeding problem or easy bruising/bleeding.  Skin:    Denies rash or suspicious lesions.    Examination:    Vital Signs:    Vitals:    11/27/24 0937   BP: (!) 157/87   Pulse: 77   Weight: 80.3 kg (177 lb)   Height: 5' 2" (1.575 m)       Body mass index is 32.37 kg/m².    Constitution:   Well-developed, well nourished patient in no acute distress.  Neurological:   Alert and oriented x 3 and cooperative to examination.     Psychiatric/Behavior: Normal mental status.  Respiratory:   No shortness of breath.non labored breathing.  Cardiovascular: Regular rate and rhythm  Eyes:    Extraoccular muscles intact  Skin:    No scars, rash or suspicious lesions.    Physical Exam:  Right Hip     No swelling, no erythema, no increased heat  No atrophy  No abductor weakness  Normal gait  Palpable pulses  Normal sensation and motor function    Well healed scar    No tenderness or instability of the hips was noted. Motion was normal. No weakness observed.          Assessment: Status post right hip replacement        Plan:  Follow up in 9 months for a 1 year checkup with clinical and radiographic exam of the right hip.      DISCLAIMER: This note may have been dictated using voice recognition software and may contain " grammatical errors.     NOTE: Consult report sent to referring provider via [facility] EMR.

## 2025-01-28 NOTE — PROGRESS NOTES
"  Patient ID: [id number]     Chief Complaint: Follow-up and Rheumatoid Arthritis (Rheumatoid arthritis of multiple sites with negative rheumatoid factor. Since last encounter she had RSV was put on oral abx and steroids twice. Medications: MTX compliant no issues. Prolia last dose 06/2024 was due 12/2024 but had hip replacement 10/2024 and was told to hold off.)        HPI:     Ana Appiah is a 84 y.o. female here today for a follow up RA visit.      Initially evaluated in 2023 and diagnosed with Seronegative RA, presented with complaints of pain in bilateral hands and left ankle.  She has pain in bilateral hands for more than 5 years but getting worse for the last 1-2 years.  Admits swelling in her knuckles intermittently.  Morning stiffness for more than couple of hours.  Admits DJD of back and intermittent chronic back and hip pain.  History of left hip replacement. Admits history of Gout in left ankle, Allopurinol 100 mg daily was prescribed by her PCP, was out of meds at her last visit but since has resumed. She has a compression fracture of lumbar vertebrae, currently on treatment for Osteoporosis with Prolia.  Prolia was started in June 2023 and is managed by her PCP. History of squamous cell skin cancer on abdomen status post resection.  No other cancers, follows with Dermatology regularly. Recently diagnosed with peptic ulcer, currently taking famotidine and Prilosec.    March 2024: Here today for follow up. Started on MTX at her last visit however just recently started as she wanted clearance from her Cardiologist before she started. She has been on meds x 1 month and tolerating well, continue with joint pain to her hands, knees and feet. She denies any red/warm/swollen joints but states "just hurts". She now has a fracture of her left 5th toe, she was seen by [facility] and was put in post op shoe x 4 weeks and has f/u with PCP- states she rolled over in bed and felt a crack- currently on " Prolia injections with PCP. Morning stiffness lasting 30 minutes.  She has been on Allopurinol for Gout and reports not flares.     October 2024: Here today for follow up. She just resumed MTX about 3-4 weeks ago, she had a hip replacement in July 2024 on right so had held her meds. She continues to tolerate the MTX well with no side effects. She denies any red/warm/swollen joints. Denies any morning stiffness. She has been having some pain in her right hip, had to start using her cane again- she has completed PT but plans to resume home exercises to see if this helps- currently taking Prednisone 20 mg po qd x 1 week per her Ortho, thinks possibly IT band inflammed- possibly adding Mobic- has f/u next week if if still bothersome they will plan to do a steroid injection at this time. Overall she reports feeling well, joints have been doing ok at this time, using topicals prn.     January 2025: Here today for follow up. She has continued MTX 15 mg po qweek with folic acid 1 mg po qd and tolerating well- she was off for almost a month- ended up being dx with RSV and had to take rounds of steroids and antibiotics and is finally feeling better- plans to resume this week- wanted to wait until her apt today to make sure ok to resume. She denies any red/warm/swollen joints, denies morning stiffness.  She did have some discomfort to her left index finger last week but has resolved. She is also s/p right hip replacement 7/2024- has been doing well, denies pain- had follow up with Ortho in November 2024 and released for 1 year follow up. She was due for her Prolia injection last month but did not take as she was told to hold off after her hip surgery- should be able to resume so we will reach out to Ortho to verify.  Says she has been having some abdominal pain, has been present for quite some time but feels like it has gradually worsened.  She denies any nausea/vomiting, she does suffer with constipation, denies bloody  stools.    Denies any recent hospitalizations since last visit.     PMH: Squamous cell ca (excision  and then again tx with topical ), Peptic Ulcer, CKD, DM, HTN, HLD, Osteoporosis, Hypothyroidism     Dr. Burrows- Dermatology  Dr. Akhtar- Cardiology   Dr. Kel Gomez- GI  Dr. Teixeira- Neurology- spinal stimulator- f/u prn  Dr. Kenney- Ortho    Denies history of fevers, rashes, photosensitivity, oral or nasal ulcers, h/o MI, stroke, seizures, h/o PE or DVT, Raynaud's phenomenon, uveitis.   Family history of autoimmune disease:  None  Smoking:  Former smoker. (Smoked from age 16 to , 1 ppd)- congratulated her on cessation!    Social History     Tobacco Use   Smoking Status Former    Current packs/day: 0.00    Types: Cigarettes    Quit date:     Years since quittin.0    Passive exposure: Never   Smokeless Tobacco Never          -------------------------------------    Arthritis    Cancer    skin --resolved    Coronary artery disease    Diabetes mellitus    Digestive disorder    Gout, unspecified    Hypertension    Renal disorder    CKD    Thyroid disease        Past Surgical History:   Procedure Laterality Date    CATARACT EXTRACTION W/  INTRAOCULAR LENS IMPLANT      COLONOSCOPY      HIP REPLACEMENT ARTHROPLASTY Left     HYSTERECTOMY      Right hand surgery      ROBOTIC ARTHROPLASTY, HIP Right 2024    Procedure: ROBOTIC ARTHROPLASTY,HIP;  Surgeon: Karri Kenney MD;  Location: [facility];  Service: Orthopedics;  Laterality: Right;  Edward    SPINAL CORD STIMULATOR IMPLANT         Review of patient's allergies indicates:   Allergen Reactions    Penicillins      Other reaction(s): SOB, SWELLING AT INJ. SITE       Outpatient Medications Marked as Taking for the 25 encounter (Office Visit) with Paola Diehl FNP   Medication Sig Dispense Refill    allopurinoL (ZYLOPRIM) 100 MG tablet Take 1 tablet by mouth once daily.      amLODIPine (NORVASC) 10 MG tablet Take 10 mg by mouth  nightly.      diclofenac sodium (VOLTAREN) 1 % Gel Apply 2 g topically 4 (four) times daily. 100 g 5    HYDROcodone-acetaminophen (NORCO) 7.5-325 mg per tablet Take 1 tablet by mouth every 6 (six) hours as needed.      irbesartan (AVAPRO) 300 MG tablet Take 300 mg by mouth once daily.      levothyroxine (SYNTHROID) 50 MCG tablet Take 50 mcg by mouth before breakfast.      metFORMIN (GLUCOPHAGE-XR) 500 MG ER 24hr tablet Take 500 mg by mouth daily with dinner or evening meal.      ondansetron (ZOFRAN-ODT) 8 MG TbDL Take 1 tablet (8 mg total) by mouth every 8 (eight) hours as needed (nausea). 20 tablet 0    pantoprazole (PROTONIX) 20 MG tablet Take 40 mg by mouth once daily.      pravastatin (PRAVACHOL) 10 MG tablet Take 10 mg by mouth every evening.      PROLIA 60 mg/mL Syrg Inject 60 mg into the skin every 6 (six) months.      sertraline (ZOLOFT) 50 MG tablet Take 50 mg by mouth every evening.      [DISCONTINUED] folic acid (FOLVITE) 1 MG tablet Take 1 tablet (1 mg total) by mouth once daily. 30 tablet 6    [DISCONTINUED] methotrexate 2.5 MG Tab Take 6 tablets qweek. Ok to split dose and take 3 tab in am and 3 tab in pm. Hold for infection or fever. 30 tablet 2       Social History     Socioeconomic History    Marital status:    Tobacco Use    Smoking status: Former     Current packs/day: 0.00     Types: Cigarettes     Quit date:      Years since quittin.0     Passive exposure: Never    Smokeless tobacco: Never   Substance and Sexual Activity    Alcohol use: Not Currently     Comment: socially    Drug use: Yes     Types: Marijuana     Comment: Medical marijuana    Sexual activity: Not Currently     Partners: Male     Social Drivers of Health     Financial Resource Strain: Medium Risk (2024)    Overall Financial Resource Strain (CARDIA)     Difficulty of Paying Living Expenses: Somewhat hard   Food Insecurity: No Food Insecurity (2024)    Hunger Vital Sign     Worried About Running Out of  Food in the Last Year: Never true     Ran Out of Food in the Last Year: Never true   Transportation Needs: No Transportation Needs (7/24/2024)    TRANSPORTATION NEEDS     Transportation : No   Stress: No Stress Concern Present (7/24/2024)    [employer] - Occupational Stress Questionnaire     Feeling of Stress : Not at all   Housing Stability: Low Risk  (7/24/2024)    Housing Stability Vital Sign     Unable to Pay for Housing in the Last Year: No     Homeless in the Last Year: No        Family History   Problem Relation Name Age of Onset    Diabetes Mellitus Mother      Cancer Sister            There is no immunization history on file for this patient.    Patient Care Team:  Candie Clinton MD as PCP - General (Internal Medicine)  Cheri Roth NP (Inactive) (Family Medicine)     Subjective:     Constitutional:  Denies chills. Denies fever. Denies night sweats. Denies weight loss.   Ophthalmology: Denies blurred vision. Admits dry eyes- uses gtts prn and helps. Denies eye pain. Denies Itching and redness.   ENT: Denies oral ulcers. Denies epistaxis. Denies dry mouth. Denies swollen glands.   Endocrine: Admits diabetes. Admits thyroid Problems.   Respiratory: Denies cough. Denies shortness of breath. Denies shortness of breath with exertion. Denies hemoptysis.   Cardiovascular: Denies chest pain at rest. Denies chest pain with exertion. Denies palpitations.    Gastrointestinal: Admits abdominal pain- comes and goes- sharp shooting at times- RUQ/Epigastric- ongoing on for some time now- has been off of her Protonix since sick- enc to resume- if no relief follow up with PCP- ER precautions . Denies diarrhea. Denies nausea. Denies vomiting. Denies hematemesis or hematochezia. Denies heartburn.  Genitourinary: Denies blood in urine.  Musculoskeletal: See HPI for details  Integumentary: Denies rash. Denies photosensitivity.   Peripheral Vascular: Denies Ulcers of hands and/or  "feet. Denies Cold extremities.   Neurologic: Denies dizziness. Denies headache.  Denies loss of strength. Denies numbness or tingling.   Psychiatric: Denies depression. Denies anxiety. Denies suicidal/homicidal ideations.      Objective:     Resp 20   Ht 5' 2" (1.575 m)   Wt 79.2 kg (174 lb 9.6 oz)   BMI 31.93 kg/m²     Physical Exam    General Appearance: alert, pleasant, in no acute distress.  Skin: Skin color, texture, turgor normal. No rashes or lesions. Multiple moles and actinic keratosis on the body.  Eyes:  extraocular movement intact (EOMI), pupils equal, round, reactive to light and accommodation, conjunctiva clear.  ENT: No oral or nasal ulcers.  Neck:  Neck supple. No adenopathy.   Lungs: CTA throughout without crackles, rhonchi, or wheezes.   Heart: RRR w/o murmurs.  No edema  Abdomen: BS x4 quad, soft, +tenderness to RUQ/Epigastric area, mild tenderness to LLQ, no guarding, rebound or masses palpated   Neuro: Alert, oriented, CN II-XII GI, sensory and motor innervation intact.  Musculoskeletal:  No tenderness or swelling to bilateral MCPs/PIPs/DIPs.  DJD changes bilaterally.  Heberden's nodes bilaterally.  Squaring of bilateral CMC joints.  Decreased range of bilateral shoulders.  No tenderness to bilateral ankles today. No tenderness to MTPs bilaterally, overlap of 2nd right MTD- no tenderness no broken skin noted- enc use of corn pads to help prevent  Decreased range of motion of spine.  (On initial exam)  Psych: Alert, oriented, normal eye contact.      Labs:   1/10/23:  CBC okay.  Creatinine 1.23, GFR 44.  CMP okay otherwise.  Intermittent elevation of creatinine since 2019.  Creatinine stable.  Rheumatoid factor negative.    07/19/2023: creatinine 1.16, better than before, GFR 47.  CMP okay.  Uric acid 6.9.  CBC okay.  Negative hepatitis-B, hepatitis-C, HIV, QuantiFERON TB.  CRP, ESR normal.    3/8/2024 CMP creat 1.19, GFR 45- stable, Uric Acid 7.1-fax to PCP, CBC ok. Sed Rate 9 (<20), CRP 1 " (<5)    07/05/2024 A1c 5.9     07/25/2024 CBC hemoglobin 10.1, hematocrit 31.2, BMP creatinine 1.12, a calcium 8.2, GFR 49-stable, 7/24/2024 BMP K+ 5.6  - labs post hip surgery     10/18/2024 CMP creatinine 1.23, GFR 43 stable, CBC okay    Imaging:   CT pelvis March 2023 showed DJD of lumbar spine, right hip, asymmetric appearance of DJD at sacroiliac joints.  Left hip arthroplasty in good position.  Diverticulosis present.  3/2023:  CT lumbar spine showed degenerative disc disease and spondylosis of lumbar spine.  L2 and L3 chronic compression deformities, L5/S1 spondylolisthesis.    7/20/23: CXR normal.     7/21/23:  Normal x-ray of bilateral feet.  DJD changes in bilateral hands.  Marginal periarticular erosive changes in the IP joints bilaterally.    3/6/2024 Left Foot Xray: Fifth proximal phalanx fracture.     1/6/2025 CXRay: Impression: No acute cardiopulmonary abnormality.     Assessment:       ICD-10-CM ICD-9-CM   1. Rheumatoid arthritis of multiple sites with negative rheumatoid factor  M06.09 714.0   2. High risk medication use  Z79.899 V58.69   3. Drug-induced immunodeficiency  D84.821 279.3    Z79.899 E947.9   4. Primary osteoarthritis involving multiple joints  M15.0 715.98   5. Primary hypertension  I10 401.9   6. Hyperlipidemia, unspecified hyperlipidemia type  E78.5 272.4   7. Borderline diabetes  R73.03 790.29   8. Age-related osteoporosis without current pathological fracture  M81.0 733.01   9. Chronic gout of left ankle, unspecified cause  M1A.0720 274.02   10. Epigastric pain  R10.13 789.06           Plan:     1. Rheumatoid arthritis of multiple sites with negative rheumatoid factor  Rheumatoid factor negative.  Synovitis in bilateral MCPs and MTPs on initial exam.  She has mild Rheumatoid Arthritis. She was started on MTX 15 mg po qd with folic acid in July 2024. Today on exam, no active synovitis noted.   - Continue MTX 15 mg po qweek with folic acid 1 mg po qd-will continue at 15 mg po qd due  to CKD.   - Continue Diclofenac topically as directed - if desired we can consider compounded topical in the future.   - Enc use of arthritis gloves at night  - Labs today for continued monitoring   - Infection w/u negative 7/2023, repeat today  - Cxray ok 1/2025     2. High risk medication use/Drug Induced deficiency  - Persons with rheumatoid arthritis, lupus, psoriatic arthritis and other autoimmune diseases are at increased risk of cardiovascular disease including heart attack and stroke. We recommend that all patients with these conditions have annual health maintenance exams including lipid measurements, blood pressure measurements, and smoking cessation counseling when applicable at their primary care provider's office.   - Completed Mammogram and PAP screenings, defers further Mammograms and PAP completed for age, Colonoscopy 10/2023 UTD.   - Advised to stay up-to-date on age appropriate vaccinations and malignancy screening, including yearly skin exams.  Continue to follow up with Dermatology for skin checks. Tries to avoid sun and stays in the shade  - Refuses all vaccinations.     3. Osteoarthritis  - Osteoarthritis of bilateral hands, generalized Osteoarthritis.  History of left hip replacement.  DJD of spine.  Advised to take Tylenol as needed for joint pain.  Okay to use Diclofenac topical cream 3 to 4 times a day for hand Osteoarthritis.     4. Primary HTN, HLD and borderline DM  -Slightly elevated today- did not take meds this am- enc to take when she gets home and monitor, continue to follow up with PCP.  Encourage low-sodium diet  -Last A1C 5.9  July 2024, encouraged diet modifications of sugar/carb/starch intake     5. Osteoporosis  - Compression fracture of lumbar vertebrae.  She was started on Prolia in June 2023 by her PCP.  PCP managing treatment of Osteoporosis.  - Continue Ca+ Vitamin D with weight bearing exercises as tolerated.   - 3/6/2024 Now with Left Foot Xray: Fifth proximal phalanx  fracture. Reports healed well with no issues.   - She is due for her Prolia injection (due 12/24)- but was advised to hold off by Ortho when she had her hip replacement- now s/p 4 months- Teresa her daughter did reach out to Dr. Yanez office during the visit and advised ok to resume- will given injection this week as she gives it at home     6. Idiopathic chronic gout of multiple sites without tophus  - She is on Allopurinol 100 mg daily.    - Uric Acid 7.1 March 2024- followed by PCP      7. Abd Pain- Epigastric/RUQ  - RUQ/Epigastric tenderness on exam, mild discomfort to LLQ- she has been off of her Protonix now for the past month- stopped when she was sick- enc to resume, no need to hold when ill- she was advised to monitor her diet, avoid any fried/greasy/fatty foods, no ETOH- if pain worsens, ER precautions- strongly enc to call and schedule f/u apt with PCP for next week for evaluation- possible abd u/s to r/o gallbladder. Enc to hydrate well, if needed, can try Miralax OTC for constipation- last BM yesterday but does not feel she emptied well- follow up with PCP, ER if worsens         Follow up in about 3 months (around 4/30/2025) for NP Follow Up. In addition to their scheduled follow up, the patient has also been instructed to follow up on as needed basis.     Total time spent with patient and documentation is 40 minutes. All questions were answered to patient's satisfaction and patient verbalized understanding. This includes face to face time and non-face to face time preparing to see the patient (eg, review of tests), obtaining and/or reviewing separately obtained history, documenting clinical information in the electronic or other health record, independently interpreting results and communicating results to the patient/family/caregiver, or care coordinator.

## 2025-01-31 ENCOUNTER — LAB VISIT (OUTPATIENT)
Dept: LAB | Facility: HOSPITAL | Age: 85
End: 2025-01-31
Attending: NURSE PRACTITIONER
Payer: MEDICARE

## 2025-01-31 ENCOUNTER — OFFICE VISIT (OUTPATIENT)
Dept: RHEUMATOLOGY | Facility: CLINIC | Age: 85
End: 2025-01-31
Payer: MEDICARE

## 2025-01-31 ENCOUNTER — PATIENT MESSAGE (OUTPATIENT)
Dept: ORTHOPEDICS | Facility: CLINIC | Age: 85
End: 2025-01-31
Payer: MEDICARE

## 2025-01-31 VITALS
SYSTOLIC BLOOD PRESSURE: 177 MMHG | RESPIRATION RATE: 20 BRPM | BODY MASS INDEX: 32.14 KG/M2 | HEIGHT: 62 IN | WEIGHT: 174.63 LBS | DIASTOLIC BLOOD PRESSURE: 98 MMHG

## 2025-01-31 DIAGNOSIS — D84.821 DRUG-INDUCED IMMUNODEFICIENCY: ICD-10-CM

## 2025-01-31 DIAGNOSIS — M15.0 PRIMARY OSTEOARTHRITIS INVOLVING MULTIPLE JOINTS: ICD-10-CM

## 2025-01-31 DIAGNOSIS — Z79.899 HIGH RISK MEDICATION USE: ICD-10-CM

## 2025-01-31 DIAGNOSIS — M1A.0720 CHRONIC GOUT OF LEFT ANKLE, UNSPECIFIED CAUSE: ICD-10-CM

## 2025-01-31 DIAGNOSIS — M81.0 AGE-RELATED OSTEOPOROSIS WITHOUT CURRENT PATHOLOGICAL FRACTURE: ICD-10-CM

## 2025-01-31 DIAGNOSIS — I10 PRIMARY HYPERTENSION: ICD-10-CM

## 2025-01-31 DIAGNOSIS — M06.09 RHEUMATOID ARTHRITIS OF MULTIPLE SITES WITH NEGATIVE RHEUMATOID FACTOR: Primary | ICD-10-CM

## 2025-01-31 DIAGNOSIS — M06.09 RHEUMATOID ARTHRITIS OF MULTIPLE SITES WITH NEGATIVE RHEUMATOID FACTOR: ICD-10-CM

## 2025-01-31 DIAGNOSIS — R73.03 BORDERLINE DIABETES: ICD-10-CM

## 2025-01-31 DIAGNOSIS — E78.5 HYPERLIPIDEMIA, UNSPECIFIED HYPERLIPIDEMIA TYPE: ICD-10-CM

## 2025-01-31 DIAGNOSIS — Z79.899 DRUG-INDUCED IMMUNODEFICIENCY: ICD-10-CM

## 2025-01-31 DIAGNOSIS — R10.13 EPIGASTRIC PAIN: ICD-10-CM

## 2025-01-31 LAB
ALBUMIN SERPL-MCNC: 3.4 G/DL (ref 3.4–4.8)
ALBUMIN/GLOB SERPL: 0.7 RATIO (ref 1.1–2)
ALP SERPL-CCNC: 75 UNIT/L (ref 40–150)
ALT SERPL-CCNC: 8 UNIT/L (ref 0–55)
ANION GAP SERPL CALC-SCNC: 7 MEQ/L
AST SERPL-CCNC: 14 UNIT/L (ref 5–34)
BASOPHILS # BLD AUTO: 0.07 X10(3)/MCL
BASOPHILS NFR BLD AUTO: 0.9 %
BILIRUB SERPL-MCNC: 0.6 MG/DL
BUN SERPL-MCNC: 14.1 MG/DL (ref 9.8–20.1)
CALCIUM SERPL-MCNC: 9.9 MG/DL (ref 8.4–10.2)
CHLORIDE SERPL-SCNC: 108 MMOL/L (ref 98–107)
CO2 SERPL-SCNC: 26 MMOL/L (ref 23–31)
CREAT SERPL-MCNC: 1.17 MG/DL (ref 0.55–1.02)
CREAT/UREA NIT SERPL: 12
CRP SERPL-MCNC: 5.9 MG/L
EOSINOPHIL # BLD AUTO: 0.29 X10(3)/MCL (ref 0–0.9)
EOSINOPHIL NFR BLD AUTO: 3.6 %
ERYTHROCYTE [DISTWIDTH] IN BLOOD BY AUTOMATED COUNT: 14.2 % (ref 11.5–17)
ERYTHROCYTE [SEDIMENTATION RATE] IN BLOOD: 21 MM/HR (ref 0–20)
GFR SERPLBLD CREATININE-BSD FMLA CKD-EPI: 46 ML/MIN/1.73/M2
GLOBULIN SER-MCNC: 4.7 GM/DL (ref 2.4–3.5)
GLUCOSE SERPL-MCNC: 143 MG/DL (ref 82–115)
HBV CORE AB SERPL QL IA: NONREACTIVE
HBV SURFACE AB SER-ACNC: 0.72 MIU/ML
HBV SURFACE AB SERPL IA-ACNC: NONREACTIVE M[IU]/ML
HBV SURFACE AG SERPL QL IA: NONREACTIVE
HCT VFR BLD AUTO: 42.2 % (ref 37–47)
HCV AB SERPL QL IA: NONREACTIVE
HGB BLD-MCNC: 13.2 G/DL (ref 12–16)
HIV 1+2 AB+HIV1 P24 AG SERPL QL IA: NONREACTIVE
IMM GRANULOCYTES # BLD AUTO: 0.14 X10(3)/MCL (ref 0–0.04)
IMM GRANULOCYTES NFR BLD AUTO: 1.7 %
LYMPHOCYTES # BLD AUTO: 1.33 X10(3)/MCL (ref 0.6–4.6)
LYMPHOCYTES NFR BLD AUTO: 16.4 %
MCH RBC QN AUTO: 28.8 PG (ref 27–31)
MCHC RBC AUTO-ENTMCNC: 31.3 G/DL (ref 33–36)
MCV RBC AUTO: 92.1 FL (ref 80–94)
MONOCYTES # BLD AUTO: 0.73 X10(3)/MCL (ref 0.1–1.3)
MONOCYTES NFR BLD AUTO: 9 %
NEUTROPHILS # BLD AUTO: 5.55 X10(3)/MCL (ref 2.1–9.2)
NEUTROPHILS NFR BLD AUTO: 68.4 %
NRBC BLD AUTO-RTO: 0 %
PLATELET # BLD AUTO: 310 X10(3)/MCL (ref 130–400)
PMV BLD AUTO: 10 FL (ref 7.4–10.4)
POTASSIUM SERPL-SCNC: 5.4 MMOL/L (ref 3.5–5.1)
PROT SERPL-MCNC: 8.1 GM/DL (ref 5.8–7.6)
RBC # BLD AUTO: 4.58 X10(6)/MCL (ref 4.2–5.4)
SODIUM SERPL-SCNC: 141 MMOL/L (ref 136–145)
WBC # BLD AUTO: 8.11 X10(3)/MCL (ref 4.5–11.5)

## 2025-01-31 PROCEDURE — 86803 HEPATITIS C AB TEST: CPT

## 2025-01-31 PROCEDURE — 36415 COLL VENOUS BLD VENIPUNCTURE: CPT

## 2025-01-31 PROCEDURE — 3288F FALL RISK ASSESSMENT DOCD: CPT | Mod: CPTII,,, | Performed by: NURSE PRACTITIONER

## 2025-01-31 PROCEDURE — 1160F RVW MEDS BY RX/DR IN RCRD: CPT | Mod: CPTII,,, | Performed by: NURSE PRACTITIONER

## 2025-01-31 PROCEDURE — 86140 C-REACTIVE PROTEIN: CPT

## 2025-01-31 PROCEDURE — 1101F PT FALLS ASSESS-DOCD LE1/YR: CPT | Mod: CPTII,,, | Performed by: NURSE PRACTITIONER

## 2025-01-31 PROCEDURE — 85025 COMPLETE CBC W/AUTO DIFF WBC: CPT

## 2025-01-31 PROCEDURE — 86706 HEP B SURFACE ANTIBODY: CPT

## 2025-01-31 PROCEDURE — 99215 OFFICE O/P EST HI 40 MIN: CPT | Mod: S$PBB,,, | Performed by: NURSE PRACTITIONER

## 2025-01-31 PROCEDURE — 3077F SYST BP >= 140 MM HG: CPT | Mod: CPTII,,, | Performed by: NURSE PRACTITIONER

## 2025-01-31 PROCEDURE — 87389 HIV-1 AG W/HIV-1&-2 AB AG IA: CPT

## 2025-01-31 PROCEDURE — 3080F DIAST BP >= 90 MM HG: CPT | Mod: CPTII,,, | Performed by: NURSE PRACTITIONER

## 2025-01-31 PROCEDURE — 86480 TB TEST CELL IMMUN MEASURE: CPT

## 2025-01-31 PROCEDURE — 99214 OFFICE O/P EST MOD 30 MIN: CPT | Mod: PBBFAC | Performed by: NURSE PRACTITIONER

## 2025-01-31 PROCEDURE — 85652 RBC SED RATE AUTOMATED: CPT

## 2025-01-31 PROCEDURE — 1126F AMNT PAIN NOTED NONE PRSNT: CPT | Mod: CPTII,,, | Performed by: NURSE PRACTITIONER

## 2025-01-31 PROCEDURE — 86704 HEP B CORE ANTIBODY TOTAL: CPT

## 2025-01-31 PROCEDURE — 87340 HEPATITIS B SURFACE AG IA: CPT

## 2025-01-31 PROCEDURE — 1159F MED LIST DOCD IN RCRD: CPT | Mod: CPTII,,, | Performed by: NURSE PRACTITIONER

## 2025-01-31 PROCEDURE — 80053 COMPREHEN METABOLIC PANEL: CPT

## 2025-01-31 RX ORDER — FOLIC ACID 1 MG/1
1 TABLET ORAL DAILY
Qty: 30 TABLET | Refills: 6 | Status: SHIPPED | OUTPATIENT
Start: 2025-01-31 | End: 2025-07-30

## 2025-01-31 RX ORDER — METHOTREXATE 2.5 MG/1
TABLET ORAL
Qty: 30 TABLET | Refills: 2 | Status: SHIPPED | OUTPATIENT
Start: 2025-01-31

## 2025-02-04 DIAGNOSIS — E87.5 HYPERKALEMIA: Primary | ICD-10-CM

## 2025-02-04 LAB
GAMMA INTERFERON BACKGROUND BLD IA-ACNC: 0.06 IU/ML
M TB IFN-G BLD-IMP: NEGATIVE
M TB IFN-G CD4+ BCKGRND COR BLD-ACNC: -0.02 IU/ML
M TB IFN-G CD4+CD8+ BCKGRND COR BLD-ACNC: -0.02 IU/ML
MITOGEN IGNF BCKGRD COR BLD-ACNC: 9.85 IU/ML

## 2025-02-05 ENCOUNTER — LAB VISIT (OUTPATIENT)
Dept: LAB | Facility: HOSPITAL | Age: 85
End: 2025-02-05
Attending: NURSE PRACTITIONER
Payer: MEDICARE

## 2025-02-05 DIAGNOSIS — E87.5 HYPERKALEMIA: ICD-10-CM

## 2025-02-05 LAB
ANION GAP SERPL CALC-SCNC: 8 MEQ/L
BUN SERPL-MCNC: 18.4 MG/DL (ref 9.8–20.1)
CALCIUM SERPL-MCNC: 9.1 MG/DL (ref 8.4–10.2)
CHLORIDE SERPL-SCNC: 106 MMOL/L (ref 98–107)
CO2 SERPL-SCNC: 25 MMOL/L (ref 23–31)
CREAT SERPL-MCNC: 1.3 MG/DL (ref 0.55–1.02)
CREAT/UREA NIT SERPL: 14
GFR SERPLBLD CREATININE-BSD FMLA CKD-EPI: 41 ML/MIN/1.73/M2
GLUCOSE SERPL-MCNC: 190 MG/DL (ref 82–115)
POTASSIUM SERPL-SCNC: 5.2 MMOL/L (ref 3.5–5.1)
SODIUM SERPL-SCNC: 139 MMOL/L (ref 136–145)

## 2025-02-05 PROCEDURE — 36415 COLL VENOUS BLD VENIPUNCTURE: CPT

## 2025-02-05 PROCEDURE — 80048 BASIC METABOLIC PNL TOTAL CA: CPT

## 2025-02-06 ENCOUNTER — TELEPHONE (OUTPATIENT)
Dept: RHEUMATOLOGY | Facility: CLINIC | Age: 85
End: 2025-02-06
Payer: MEDICARE

## 2025-02-06 NOTE — TELEPHONE ENCOUNTER
Spoke to pt's daughter Teresa informed of lab results K+ remains borderline elevated please f/u PCP ER precautions as previously advise for any chest pain/palpitation/ and dizziness. Pt's daughter verbalized understanding

## 2025-02-06 NOTE — TELEPHONE ENCOUNTER
----- Message from CYNTHIA Weinstein sent at 2/5/2025  4:35 PM CST -----  Please advise the patient  K+ remains borderline elevated, please advise her to follow-up with PCP  (now noted at 5.2, normal 3.5-5.1).  ER precautions as previously advised for any CP/palpitations/dizziness/etc..  If PCP is not in epic system, please fax over a copy of last CMP and repeat BMP, thanks

## 2025-05-08 NOTE — PROGRESS NOTES
"    Patient ID: [id number]     Chief Complaint: No issues.       HPI:     Ana Appiah is a 84 y.o. female here today for a follow up RA visit.      Initially evaluated in 2023 and diagnosed with Seronegative RA, presented with complaints of pain in bilateral hands and left ankle.  She has pain in bilateral hands for more than 5 years but getting worse for the last 1-2 years.  Admits swelling in her knuckles intermittently.  Morning stiffness for more than couple of hours.  Admits DJD of back and intermittent chronic back and hip pain.  History of left hip replacement. Admits history of Gout in left ankle, Allopurinol 100 mg daily was prescribed by her PCP, was out of meds at her last visit but since has resumed. She has a compression fracture of lumbar vertebrae, currently on treatment for Osteoporosis with Prolia.  Prolia was started in June 2023 and is managed by her PCP. History of squamous cell skin cancer on abdomen status post resection.  No other cancers, follows with Dermatology regularly. Recently diagnosed with peptic ulcer, currently taking famotidine and Prilosec.    March 2024: Here today for follow up. Started on MTX at her last visit however just recently started as she wanted clearance from her Cardiologist before she started. She has been on meds x 1 month and tolerating well, continue with joint pain to her hands, knees and feet. She denies any red/warm/swollen joints but states "just hurts". She now has a fracture of her left 5th toe, she was seen by [facility] and was put in post op shoe x 4 weeks and has f/u with PCP- states she rolled over in bed and felt a crack- currently on Prolia injections with PCP. Morning stiffness lasting 30 minutes.  She has been on Allopurinol for Gout and reports not flares.     October 2024: Here today for follow up. She just resumed MTX about 3-4 weeks ago, she had a hip replacement in July 2024 on right so had held her meds. She continues to " "tolerate the MTX well with no side effects. She denies any red/warm/swollen joints. Denies any morning stiffness. She has been having some pain in her right hip, had to start using her cane again- she has completed PT but plans to resume home exercises to see if this helps- currently taking Prednisone 20 mg po qd x 1 week per her Ortho, thinks possibly IT band inflammed- possibly adding Mobic- has f/u next week if if still bothersome they will plan to do a steroid injection at this time. Overall she reports feeling well, joints have been doing ok at this time, using topicals prn.     January 2025: Here today for follow up. She has continued MTX 15 mg po qweek with folic acid 1 mg po qd and tolerating well- she was off for almost a month- ended up being dx with RSV and had to take rounds of steroids and antibiotics and is finally feeling better- plans to resume this week- wanted to wait until her apt today to make sure ok to resume. She denies any red/warm/swollen joints, denies morning stiffness.  She did have some discomfort to her left index finger last week but has resolved. She is also s/p right hip replacement 7/2024- has been doing well, denies pain- had follow up with Ortho in November 2024 and released for 1 year follow up. She was due for her Prolia injection last month but did not take as she was told to hold off after her hip surgery- should be able to resume so we will reach out to Ortho to verify.  Says she has been having some abdominal pain, has been present for quite some time but feels like it has gradually worsened.  She denies any nausea/vomiting, she does suffer with constipation, denies bloody stools.    May 2025: Here today for follow up. She has continued MTX 15 mg po qweek with folic acid 1 mg po qd and tolerating well. She reports joints have been "pretty good", occ hand pain but has been doing well- she has been having some left ankle pain at times- using brace and topicals and this has " helped. She states she has not been taking Allopurinol as directed but did restart recently. Since last OV she reports  stomach pains have resolved- no issues. She did have some weight loss- states still eating her meals but not as hungry lately so not eating as much- enc her and her daughter (Teresa) to monitor.      Denies any recent illness or hospitalizations since last visit.     PMH: Squamous cell ca (excision  and then again tx with topical ), Peptic Ulcer, CKD, DM, HTN, HLD, Osteoporosis, Hypothyroidism     Dr. Burrows- Dermatology  Dr. Akhtar- Cardiology   Dr. Kel Gomez- GI  Dr. Teixeira- Neurology- spinal stimulator- f/u prn- has not seen as [facility] is no longer there  Dr. Kenney- [facility]    Denies history of fevers, rashes, photosensitivity, oral or nasal ulcers, h/o MI, stroke, seizures, h/o PE or DVT, Raynaud's phenomenon, uveitis.   Family history of autoimmune disease:  None  Smoking:  Former smoker. (Smoked from age 16 to , 1 ppd)- congratulated her on cessation!    Social History     Tobacco Use   Smoking Status Former    Current packs/day: 0.00    Types: Cigarettes    Quit date:     Years since quittin.3    Passive exposure: Never   Smokeless Tobacco Never          -------------------------------------    Arthritis    Cancer    skin --resolved    Coronary artery disease    Diabetes mellitus    Digestive disorder    Gout, unspecified    Hypertension    Renal disorder    CKD    Thyroid disease        Past Surgical History:   Procedure Laterality Date    CATARACT EXTRACTION W/  INTRAOCULAR LENS IMPLANT      COLONOSCOPY      HIP REPLACEMENT ARTHROPLASTY Left     HYSTERECTOMY      Right hand surgery      ROBOTIC ARTHROPLASTY, HIP Right 2024    Procedure: ROBOTIC ARTHROPLASTY,HIP;  Surgeon: Karri Kenney MD;  Location: [facility];  Service: Orthopedics;  Laterality: Right;  Edward    SPINAL CORD STIMULATOR IMPLANT         Review of patient's allergies indicates:   Allergen  Reactions    Penicillins      Other reaction(s): SOB, SWELLING AT INJ. SITE       Outpatient Medications Marked as Taking for the 25 encounter (Office Visit) with Paola Diehl FNP   Medication Sig Dispense Refill    allopurinoL (ZYLOPRIM) 100 MG tablet Take 1 tablet by mouth once daily.      amLODIPine (NORVASC) 10 MG tablet Take 10 mg by mouth nightly.      diclofenac sodium (VOLTAREN) 1 % Gel Apply 2 g topically 4 (four) times daily. 100 g 5    HYDROcodone-acetaminophen (NORCO) 7.5-325 mg per tablet Take 1 tablet by mouth every 6 (six) hours as needed.      irbesartan (AVAPRO) 300 MG tablet Take 300 mg by mouth once daily.      levothyroxine (SYNTHROID) 50 MCG tablet Take 50 mcg by mouth before breakfast.      metFORMIN (GLUCOPHAGE-XR) 500 MG ER 24hr tablet Take 500 mg by mouth daily with dinner or evening meal.      ondansetron (ZOFRAN-ODT) 8 MG TbDL Take 1 tablet (8 mg total) by mouth every 8 (eight) hours as needed (nausea). 20 tablet 0    pantoprazole (PROTONIX) 20 MG tablet Take 40 mg by mouth once daily.      PROLIA 60 mg/mL Syrg Inject 60 mg into the skin every 6 (six) months.      sertraline (ZOLOFT) 50 MG tablet Take 50 mg by mouth every evening.      [DISCONTINUED] folic acid (FOLVITE) 1 MG tablet Take 1 tablet (1 mg total) by mouth once daily. 30 tablet 6    [DISCONTINUED] methotrexate 2.5 MG Tab Take 6 tablets qweek. Ok to split dose and take 3 tab in am and 3 tab in pm. Hold for infection or fever. 30 tablet 2       Social History     Socioeconomic History    Marital status:    Tobacco Use    Smoking status: Former     Current packs/day: 0.00     Types: Cigarettes     Quit date:      Years since quittin.3     Passive exposure: Never    Smokeless tobacco: Never   Substance and Sexual Activity    Alcohol use: Not Currently     Comment: socially    Drug use: Yes     Types: Marijuana     Comment: Medical marijuana    Sexual activity: Not Currently     Partners: Male      Social Drivers of Health     Financial Resource Strain: Medium Risk (7/24/2024)    Overall Financial Resource Strain (CARDIA)     Difficulty of Paying Living Expenses: Somewhat hard   Food Insecurity: No Food Insecurity (7/24/2024)    Hunger Vital Sign     Worried About Running Out of Food in the Last Year: Never true     Ran Out of Food in the Last Year: Never true   Transportation Needs: No Transportation Needs (7/24/2024)    TRANSPORTATION NEEDS     Transportation : No   Stress: No Stress Concern Present (7/24/2024)    [employer] - Occupational Stress Questionnaire     Feeling of Stress : Not at all   Housing Stability: Unknown (7/24/2024)    Housing Stability Vital Sign     Unable to Pay for Housing in the Last Year: No     Homeless in the Last Year: No        Family History   Problem Relation Name Age of Onset    Diabetes Mellitus Mother      Cancer Sister            There is no immunization history on file for this patient.    Patient Care Team:  Candie Clinton MD as PCP - General (Internal Medicine)  Cheri Roth NP (Inactive) (Family Medicine)     Subjective:     Constitutional:  Denies chills. Denies fever. Denies night sweats. Denies weight loss.   Ophthalmology: Denies blurred vision. Admits dry eyes- uses gtts prn and helps. Denies eye pain. Denies Itching and redness.   ENT: Denies oral ulcers. Denies epistaxis. Denies dry mouth. Denies swollen glands.   Endocrine: Admits diabetes. Admits thyroid Problems.   Respiratory: Denies cough. Denies shortness of breath. Denies shortness of breath with exertion. Denies hemoptysis.   Cardiovascular: Denies chest pain at rest. Denies chest pain with exertion. Denies palpitations.    Gastrointestinal: Denies abdominal pain. Denies diarrhea. Denies nausea. Denies vomiting. Denies hematemesis or hematochezia. Denies heartburn. Admits constipation- does have hemorrhoids and did have small amount of blood noted on toilet  "paper- states resolved after constipation resolved no issues since.   Genitourinary: Denies blood in urine.  Musculoskeletal: See HPI for details  Integumentary: Denies rash. Denies photosensitivity.   Peripheral Vascular: Denies Ulcers of hands and/or feet. Denies Cold extremities.   Neurologic: Denies dizziness. Denies headache.  Denies loss of strength. Denies numbness or tingling.   Psychiatric: Denies depression. Denies anxiety. Denies suicidal/homicidal ideations.      Objective:     /70   Pulse 77   Resp 18   Ht 5' 2" (1.575 m)   Wt 73.5 kg (162 lb)   SpO2 100%   BMI 29.63 kg/m²     Physical Exam    General Appearance: alert, pleasant, in no acute distress.  Skin: Skin color, texture, turgor normal. No rashes or lesions. Multiple moles and actinic keratosis on the body.  Eyes:  extraocular movement intact (EOMI), pupils equal, round, reactive to light and accommodation, conjunctiva clear.  ENT: No oral or nasal ulcers.  Neck:  Neck supple. No adenopathy.   Lungs: CTA throughout without crackles, rhonchi, or wheezes.   Heart: RRR w/o murmurs.  No edema  Neuro: Alert, oriented, CN II-XII GI, sensory and motor innervation intact.  Musculoskeletal:  No tenderness or swelling to bilateral MCPs/PIPs/DIPs.  DJD changes bilaterally.  Heberden's nodes bilaterally.  Squaring of bilateral CMC joints.  Decreased range of bilateral shoulders.  No tenderness to bilateral ankles today. No tenderness to MTPs bilaterally, overlap of 2nd right MTD- no tenderness no broken skin noted- enc use of corn pads to help prevent  Decreased range of motion of spine.  (On initial exam)  Psych: Alert, oriented, normal eye contact.      Labs:   1/10/23:  CBC okay.  Creatinine 1.23, GFR 44.  CMP okay otherwise.  Intermittent elevation of creatinine since 2019.  Creatinine stable.  Rheumatoid factor negative.    07/19/2023: creatinine 1.16, better than before, GFR 47.  CMP okay.  Uric acid 6.9.  CBC okay.  Negative hepatitis-B, " hepatitis-C, HIV, QuantiFERON TB.  CRP, ESR normal.    3/8/2024 CMP creat 1.19, GFR 45- stable, Uric Acid 7.1-fax to PCP, CBC ok. Sed Rate 9 (<20), CRP 1 (<5)    07/05/2024 A1c 5.9     07/25/2024 CBC hemoglobin 10.1, hematocrit 31.2, BMP creatinine 1.12, a calcium 8.2, GFR 49-stable, 7/24/2024 BMP K+ 5.6  - labs post hip surgery     10/18/2024 CMP creatinine 1.23, GFR 43 stable, CBC okay    1/31/2025  CBC okay, ESR 21 (< 20), CMP K+ 5.4, creatinine 1.17, GFR 46, CRP 5.9 (< 5), hep B/C/HIV/QuantiFERON all negative    02/05/2025 repeat potassium 5.2, improved but still remains elevated, creatinine 1.3, GFR 41, glucose 190-K+ remains borderline elevated, please advise her to follow-up with PCP    Imaging:   CT pelvis March 2023 showed DJD of lumbar spine, right hip, asymmetric appearance of DJD at sacroiliac joints.  Left hip arthroplasty in good position.  Diverticulosis present.  3/2023:  CT lumbar spine showed degenerative disc disease and spondylosis of lumbar spine.  L2 and L3 chronic compression deformities, L5/S1 spondylolisthesis.    7/20/23: CXR normal.     7/21/23:  Normal x-ray of bilateral feet.  DJD changes in bilateral hands.  Marginal periarticular erosive changes in the IP joints bilaterally.    3/6/2024 Left Foot Xray: Fifth proximal phalanx fracture.     1/6/2025 CXRay: Impression: No acute cardiopulmonary abnormality.     Assessment:       ICD-10-CM ICD-9-CM   1. Rheumatoid arthritis of multiple sites with negative rheumatoid factor  M06.09 714.0   2. High risk medication use  Z79.899 V58.69   3. Drug-induced immunodeficiency  D84.821 279.3    Z79.899 E947.9   4. Primary osteoarthritis involving multiple joints  M15.0 715.98   5. Age-related osteoporosis without current pathological fracture  M81.0 733.01   6. Primary hypertension  I10 401.9   7. Hyperlipidemia, unspecified hyperlipidemia type  E78.5 272.4   8. Type 2 diabetes mellitus with hyperglycemia, without long-term current use of insulin   E11.65 250.00     790.29   9. Idiopathic chronic gout of multiple sites without tophus  M1A.09X0 274.02   10. Stage 3a chronic kidney disease  N18.31 585.3         Plan:     1. Rheumatoid arthritis of multiple sites with negative rheumatoid factor  Rheumatoid factor negative.  Synovitis in bilateral MCPs and MTPs on initial exam.  She has mild Rheumatoid Arthritis. She was started on MTX 15 mg po qd with folic acid in July 2024. Today on exam, no active synovitis noted.   - Continue MTX 15 mg po qweek with folic acid 1 mg po qd-will continue at 15 mg po qd due to CKD.   - Continue Diclofenac topically as directed    - Enc use of arthritis gloves at night  - Labs today for continued monitoring   - Infection w/u negative 1/2025  - Cxray ok 1/2025     2. High risk medication use/Drug Induced deficiency  - Persons with rheumatoid arthritis, lupus, psoriatic arthritis and other autoimmune diseases are at increased risk of cardiovascular disease including heart attack and stroke. We recommend that all patients with these conditions have annual health maintenance exams including lipid measurements, blood pressure measurements, and smoking cessation counseling when applicable at their primary care provider's office.   - Completed Mammogram and PAP screenings, defers further Mammograms and PAP completed for age, Colonoscopy 10/2023 UTD.   - Advised to stay up-to-date on age appropriate vaccinations and malignancy screening, including yearly skin exams.  Continue to follow up with Dermatology for skin checks. Tries to avoid sun and stays in the shade  - Refuses all vaccinations.     3. Osteoarthritis  - Osteoarthritis of bilateral hands, generalized Osteoarthritis.  History of left hip replacement.  DJD of spine.  Advised to take Tylenol as needed for joint pain.  Okay to use Diclofenac topical cream 3 to 4 times a day for hand Osteoarthritis.     4. Primary HTN, HLD and borderline DM, CKD  -Blood pressure at goal today,  continue to follow up with PCP.  Encourage low-sodium diet  -Last A1C 5.9  July 2024, encouraged diet modifications of sugar/carb/starch intake  - She did see Nephrology several years back, advised at her age functions stable and did not need to continue to follow up- follows with PCP. She reports taking Ibuprofen on occ for back pain- states she does not take often- enc to avoid and use Tylenol Arthritis prn      5. Osteoporosis  - Compression fracture of lumbar vertebrae.  She was started on Prolia in June 2023 by her PCP.  PCP managing treatment of Osteoporosis.  - Continue Ca+ Vitamin D with weight bearing exercises as tolerated.   - 3/6/2024 Now with Left Foot Xray: Fifth proximal phalanx fracture. Reports healed well with no issues.   - She has restarted Prolia after hip replacement      6. Idiopathic chronic gout of multiple sites without tophus  - She is suppose to be on Allopurinol 100 mg daily  but reports not taking as directed- did restart recently- will repeat uric acid today  - Uric Acid 7.1 March 2024- followed by PCP      7. Abd Pain- Epigastric/RUQ- has resolved  - No issues since last visit, resolved      8.   Squamous cell ca   - Excision 2019 and then again tx with topical 2023  - Followed by Derm- daughter states will now be following up qyear as she has been doing well   - Enc continued yearly skin exams, SPF 50 or higher when outdoors, hats, long sleeves, etc        Follow up in about 3 months (around 8/9/2025) for NP Follow Up. In addition to their scheduled follow up, the patient has also been instructed to follow up on as needed basis.     Total time spent with patient and documentation is 30 minutes. All questions were answered to patient's satisfaction and patient verbalized understanding. This includes face to face time and non-face to face time preparing to see the patient (eg, review of tests), obtaining and/or reviewing separately obtained history, documenting clinical information in  the electronic or other health record, independently interpreting results and communicating results to the patient/family/caregiver, or care coordinator.

## 2025-05-09 ENCOUNTER — OFFICE VISIT (OUTPATIENT)
Dept: RHEUMATOLOGY | Facility: CLINIC | Age: 85
End: 2025-05-09
Payer: MEDICARE

## 2025-05-09 ENCOUNTER — RESULTS FOLLOW-UP (OUTPATIENT)
Dept: RHEUMATOLOGY | Facility: CLINIC | Age: 85
End: 2025-05-09

## 2025-05-09 ENCOUNTER — LAB VISIT (OUTPATIENT)
Dept: LAB | Facility: HOSPITAL | Age: 85
End: 2025-05-09
Attending: NURSE PRACTITIONER
Payer: MEDICARE

## 2025-05-09 VITALS
OXYGEN SATURATION: 100 % | DIASTOLIC BLOOD PRESSURE: 70 MMHG | BODY MASS INDEX: 29.81 KG/M2 | WEIGHT: 162 LBS | HEIGHT: 62 IN | RESPIRATION RATE: 18 BRPM | SYSTOLIC BLOOD PRESSURE: 107 MMHG | HEART RATE: 77 BPM

## 2025-05-09 DIAGNOSIS — I10 PRIMARY HYPERTENSION: ICD-10-CM

## 2025-05-09 DIAGNOSIS — E78.5 HYPERLIPIDEMIA, UNSPECIFIED HYPERLIPIDEMIA TYPE: ICD-10-CM

## 2025-05-09 DIAGNOSIS — M15.0 PRIMARY OSTEOARTHRITIS INVOLVING MULTIPLE JOINTS: ICD-10-CM

## 2025-05-09 DIAGNOSIS — M1A.09X0 IDIOPATHIC CHRONIC GOUT OF MULTIPLE SITES WITHOUT TOPHUS: Primary | ICD-10-CM

## 2025-05-09 DIAGNOSIS — M1A.09X0 IDIOPATHIC CHRONIC GOUT OF MULTIPLE SITES WITHOUT TOPHUS: ICD-10-CM

## 2025-05-09 DIAGNOSIS — N18.31 STAGE 3A CHRONIC KIDNEY DISEASE: ICD-10-CM

## 2025-05-09 DIAGNOSIS — D84.821 DRUG-INDUCED IMMUNODEFICIENCY: ICD-10-CM

## 2025-05-09 DIAGNOSIS — E11.65 TYPE 2 DIABETES MELLITUS WITH HYPERGLYCEMIA, WITHOUT LONG-TERM CURRENT USE OF INSULIN: ICD-10-CM

## 2025-05-09 DIAGNOSIS — Z79.899 DRUG-INDUCED IMMUNODEFICIENCY: ICD-10-CM

## 2025-05-09 DIAGNOSIS — M06.09 RHEUMATOID ARTHRITIS OF MULTIPLE SITES WITH NEGATIVE RHEUMATOID FACTOR: Primary | ICD-10-CM

## 2025-05-09 DIAGNOSIS — M81.0 AGE-RELATED OSTEOPOROSIS WITHOUT CURRENT PATHOLOGICAL FRACTURE: ICD-10-CM

## 2025-05-09 DIAGNOSIS — M06.09 RHEUMATOID ARTHRITIS OF MULTIPLE SITES WITH NEGATIVE RHEUMATOID FACTOR: ICD-10-CM

## 2025-05-09 DIAGNOSIS — Z79.899 HIGH RISK MEDICATION USE: ICD-10-CM

## 2025-05-09 DIAGNOSIS — M1A.0720 CHRONIC GOUT OF LEFT ANKLE, UNSPECIFIED CAUSE: Primary | ICD-10-CM

## 2025-05-09 LAB
ALBUMIN SERPL-MCNC: 3.5 G/DL (ref 3.4–4.8)
ALBUMIN/GLOB SERPL: 0.8 RATIO (ref 1.1–2)
ALP SERPL-CCNC: 77 UNIT/L (ref 40–150)
ALT SERPL-CCNC: 6 UNIT/L (ref 0–55)
ANION GAP SERPL CALC-SCNC: 12 MEQ/L
AST SERPL-CCNC: 14 UNIT/L (ref 11–45)
BASOPHILS # BLD AUTO: 0.08 X10(3)/MCL
BASOPHILS NFR BLD AUTO: 1.3 %
BILIRUB SERPL-MCNC: 0.8 MG/DL
BUN SERPL-MCNC: 13.9 MG/DL (ref 9.8–20.1)
CALCIUM SERPL-MCNC: 9.6 MG/DL (ref 8.4–10.2)
CHLORIDE SERPL-SCNC: 108 MMOL/L (ref 98–107)
CO2 SERPL-SCNC: 21 MMOL/L (ref 23–31)
CREAT SERPL-MCNC: 1.21 MG/DL (ref 0.55–1.02)
CREAT/UREA NIT SERPL: 11
EOSINOPHIL # BLD AUTO: 0.3 X10(3)/MCL (ref 0–0.9)
EOSINOPHIL NFR BLD AUTO: 4.8 %
ERYTHROCYTE [DISTWIDTH] IN BLOOD BY AUTOMATED COUNT: 14.2 % (ref 11.5–17)
GFR SERPLBLD CREATININE-BSD FMLA CKD-EPI: 44 ML/MIN/1.73/M2
GLOBULIN SER-MCNC: 4.6 GM/DL (ref 2.4–3.5)
GLUCOSE SERPL-MCNC: 146 MG/DL (ref 82–115)
HCT VFR BLD AUTO: 44.1 % (ref 37–47)
HGB BLD-MCNC: 14 G/DL (ref 12–16)
IMM GRANULOCYTES # BLD AUTO: 0.07 X10(3)/MCL (ref 0–0.04)
IMM GRANULOCYTES NFR BLD AUTO: 1.1 %
LYMPHOCYTES # BLD AUTO: 1.47 X10(3)/MCL (ref 0.6–4.6)
LYMPHOCYTES NFR BLD AUTO: 23.3 %
MCH RBC QN AUTO: 28.2 PG (ref 27–31)
MCHC RBC AUTO-ENTMCNC: 31.7 G/DL (ref 33–36)
MCV RBC AUTO: 88.7 FL (ref 80–94)
MONOCYTES # BLD AUTO: 0.99 X10(3)/MCL (ref 0.1–1.3)
MONOCYTES NFR BLD AUTO: 15.7 %
NEUTROPHILS # BLD AUTO: 3.4 X10(3)/MCL (ref 2.1–9.2)
NEUTROPHILS NFR BLD AUTO: 53.8 %
NRBC BLD AUTO-RTO: 0 %
PLATELET # BLD AUTO: 282 X10(3)/MCL (ref 130–400)
PMV BLD AUTO: 9.8 FL (ref 7.4–10.4)
POTASSIUM SERPL-SCNC: 3.6 MMOL/L (ref 3.5–5.1)
PROT SERPL-MCNC: 8.1 GM/DL (ref 5.8–7.6)
RBC # BLD AUTO: 4.97 X10(6)/MCL (ref 4.2–5.4)
SODIUM SERPL-SCNC: 141 MMOL/L (ref 136–145)
URATE SERPL-MCNC: 8.2 MG/DL (ref 2.6–6)
WBC # BLD AUTO: 6.31 X10(3)/MCL (ref 4.5–11.5)

## 2025-05-09 PROCEDURE — 85025 COMPLETE CBC W/AUTO DIFF WBC: CPT

## 2025-05-09 PROCEDURE — 36415 COLL VENOUS BLD VENIPUNCTURE: CPT

## 2025-05-09 PROCEDURE — 80053 COMPREHEN METABOLIC PANEL: CPT

## 2025-05-09 PROCEDURE — 99213 OFFICE O/P EST LOW 20 MIN: CPT | Mod: PBBFAC | Performed by: NURSE PRACTITIONER

## 2025-05-09 PROCEDURE — 84550 ASSAY OF BLOOD/URIC ACID: CPT

## 2025-05-09 RX ORDER — METHOTREXATE 2.5 MG/1
TABLET ORAL
Qty: 30 TABLET | Refills: 2 | Status: SHIPPED | OUTPATIENT
Start: 2025-05-09

## 2025-05-09 RX ORDER — FOLIC ACID 1 MG/1
1 TABLET ORAL DAILY
Qty: 30 TABLET | Refills: 6 | Status: SHIPPED | OUTPATIENT
Start: 2025-05-09 | End: 2025-11-05

## 2025-06-16 ENCOUNTER — PATIENT MESSAGE (OUTPATIENT)
Dept: RHEUMATOLOGY | Facility: CLINIC | Age: 85
End: 2025-06-16
Payer: MEDICARE

## 2025-06-19 ENCOUNTER — PATIENT MESSAGE (OUTPATIENT)
Dept: RHEUMATOLOGY | Facility: CLINIC | Age: 85
End: 2025-06-19
Payer: MEDICARE

## 2025-06-19 ENCOUNTER — TELEPHONE (OUTPATIENT)
Dept: RHEUMATOLOGY | Facility: CLINIC | Age: 85
End: 2025-06-19
Payer: MEDICARE

## 2025-06-19 NOTE — TELEPHONE ENCOUNTER
Called to communicate labs due for Paola Dailey NP. There was no answer to tc. Left a vmm with my name and affiliation and reason for my tc.Requested  a rtc to discuss.      A portal message will also be sent

## 2025-06-19 NOTE — TELEPHONE ENCOUNTER
----- Message from Nurse Armenta sent at 5/9/2025  1:47 PM CDT -----  Regarding: Repeat lab Uric Acid  05.09.25 Last OV  we will plan to repeat her uric acid in the next 4-5 weeks orders placed, please place a reminder in the computer, we will continue to monitor at future lab draws.

## 2025-08-15 ENCOUNTER — TELEPHONE (OUTPATIENT)
Dept: RHEUMATOLOGY | Facility: CLINIC | Age: 85
End: 2025-08-15
Payer: MEDICARE

## 2025-08-15 ENCOUNTER — OFFICE VISIT (OUTPATIENT)
Dept: RHEUMATOLOGY | Facility: CLINIC | Age: 85
End: 2025-08-15
Payer: MEDICARE

## 2025-08-15 ENCOUNTER — LAB VISIT (OUTPATIENT)
Dept: LAB | Facility: HOSPITAL | Age: 85
End: 2025-08-15
Attending: NURSE PRACTITIONER
Payer: MEDICARE

## 2025-08-15 VITALS
HEIGHT: 62 IN | HEART RATE: 71 BPM | DIASTOLIC BLOOD PRESSURE: 73 MMHG | WEIGHT: 165.63 LBS | OXYGEN SATURATION: 100 % | TEMPERATURE: 98 F | BODY MASS INDEX: 30.48 KG/M2 | SYSTOLIC BLOOD PRESSURE: 137 MMHG | RESPIRATION RATE: 18 BRPM

## 2025-08-15 DIAGNOSIS — M06.09 RHEUMATOID ARTHRITIS OF MULTIPLE SITES WITH NEGATIVE RHEUMATOID FACTOR: Primary | ICD-10-CM

## 2025-08-15 DIAGNOSIS — C44.92 SCC (SQUAMOUS CELL CARCINOMA): ICD-10-CM

## 2025-08-15 DIAGNOSIS — D84.821 DRUG-INDUCED IMMUNODEFICIENCY: ICD-10-CM

## 2025-08-15 DIAGNOSIS — Z79.899 HIGH RISK MEDICATION USE: ICD-10-CM

## 2025-08-15 DIAGNOSIS — E78.5 HYPERLIPIDEMIA, UNSPECIFIED HYPERLIPIDEMIA TYPE: ICD-10-CM

## 2025-08-15 DIAGNOSIS — M15.0 PRIMARY OSTEOARTHRITIS INVOLVING MULTIPLE JOINTS: ICD-10-CM

## 2025-08-15 DIAGNOSIS — M81.0 AGE-RELATED OSTEOPOROSIS WITHOUT CURRENT PATHOLOGICAL FRACTURE: ICD-10-CM

## 2025-08-15 DIAGNOSIS — Z79.899 DRUG-INDUCED IMMUNODEFICIENCY: ICD-10-CM

## 2025-08-15 DIAGNOSIS — M06.09 RHEUMATOID ARTHRITIS OF MULTIPLE SITES WITH NEGATIVE RHEUMATOID FACTOR: ICD-10-CM

## 2025-08-15 DIAGNOSIS — I10 PRIMARY HYPERTENSION: ICD-10-CM

## 2025-08-15 DIAGNOSIS — M1A.09X0 IDIOPATHIC CHRONIC GOUT OF MULTIPLE SITES WITHOUT TOPHUS: ICD-10-CM

## 2025-08-15 DIAGNOSIS — N18.31 STAGE 3A CHRONIC KIDNEY DISEASE: ICD-10-CM

## 2025-08-15 DIAGNOSIS — E11.65 TYPE 2 DIABETES MELLITUS WITH HYPERGLYCEMIA, WITHOUT LONG-TERM CURRENT USE OF INSULIN: ICD-10-CM

## 2025-08-15 LAB
ALBUMIN SERPL-MCNC: 4 G/DL (ref 3.4–4.8)
ALBUMIN/GLOB SERPL: 0.9 RATIO (ref 1.1–2)
ALP SERPL-CCNC: 88 UNIT/L (ref 40–150)
ALT SERPL-CCNC: 16 UNIT/L (ref 0–55)
ANION GAP SERPL CALC-SCNC: 9 MEQ/L
AST SERPL-CCNC: 25 UNIT/L (ref 11–45)
BASOPHILS # BLD AUTO: 0.08 X10(3)/MCL
BASOPHILS NFR BLD AUTO: 1.1 %
BILIRUB SERPL-MCNC: 0.9 MG/DL
BUN SERPL-MCNC: 16.6 MG/DL (ref 9.8–20.1)
CALCIUM SERPL-MCNC: 9.9 MG/DL (ref 8.4–10.2)
CHLORIDE SERPL-SCNC: 107 MMOL/L (ref 98–107)
CO2 SERPL-SCNC: 25 MMOL/L (ref 23–31)
CREAT SERPL-MCNC: 1.41 MG/DL (ref 0.55–1.02)
CREAT/UREA NIT SERPL: 12
EOSINOPHIL # BLD AUTO: 0.35 X10(3)/MCL (ref 0–0.9)
EOSINOPHIL NFR BLD AUTO: 4.9 %
ERYTHROCYTE [DISTWIDTH] IN BLOOD BY AUTOMATED COUNT: 13.2 % (ref 11.5–17)
GFR SERPLBLD CREATININE-BSD FMLA CKD-EPI: 37 ML/MIN/1.73/M2
GLOBULIN SER-MCNC: 4.6 GM/DL (ref 2.4–3.5)
GLUCOSE SERPL-MCNC: 117 MG/DL (ref 82–115)
HCT VFR BLD AUTO: 43.9 % (ref 37–47)
HGB BLD-MCNC: 14.4 G/DL (ref 12–16)
IMM GRANULOCYTES # BLD AUTO: 0.03 X10(3)/MCL (ref 0–0.04)
IMM GRANULOCYTES NFR BLD AUTO: 0.4 %
LYMPHOCYTES # BLD AUTO: 1.87 X10(3)/MCL (ref 0.6–4.6)
LYMPHOCYTES NFR BLD AUTO: 26.4 %
MCH RBC QN AUTO: 29.9 PG (ref 27–31)
MCHC RBC AUTO-ENTMCNC: 32.8 G/DL (ref 33–36)
MCV RBC AUTO: 91.1 FL (ref 80–94)
MONOCYTES # BLD AUTO: 0.57 X10(3)/MCL (ref 0.1–1.3)
MONOCYTES NFR BLD AUTO: 8 %
NEUTROPHILS # BLD AUTO: 4.19 X10(3)/MCL (ref 2.1–9.2)
NEUTROPHILS NFR BLD AUTO: 59.2 %
NRBC BLD AUTO-RTO: 0 %
PLATELET # BLD AUTO: 210 X10(3)/MCL (ref 130–400)
PMV BLD AUTO: 9.7 FL (ref 7.4–10.4)
POTASSIUM SERPL-SCNC: 4.7 MMOL/L (ref 3.5–5.1)
PROT SERPL-MCNC: 8.6 GM/DL (ref 5.8–7.6)
RBC # BLD AUTO: 4.82 X10(6)/MCL (ref 4.2–5.4)
SODIUM SERPL-SCNC: 141 MMOL/L (ref 136–145)
WBC # BLD AUTO: 7.09 X10(3)/MCL (ref 4.5–11.5)

## 2025-08-15 PROCEDURE — 36415 COLL VENOUS BLD VENIPUNCTURE: CPT

## 2025-08-15 PROCEDURE — 80053 COMPREHEN METABOLIC PANEL: CPT

## 2025-08-15 PROCEDURE — 99214 OFFICE O/P EST MOD 30 MIN: CPT | Mod: PBBFAC | Performed by: NURSE PRACTITIONER

## 2025-08-15 PROCEDURE — 85025 COMPLETE CBC W/AUTO DIFF WBC: CPT

## 2025-08-15 RX ORDER — METHOTREXATE 2.5 MG/1
TABLET ORAL
Qty: 30 TABLET | Refills: 2 | Status: SHIPPED | OUTPATIENT
Start: 2025-08-15

## 2025-08-15 RX ORDER — FOLIC ACID 1 MG/1
1 TABLET ORAL DAILY
Qty: 30 TABLET | Refills: 6 | Status: SHIPPED | OUTPATIENT
Start: 2025-08-15 | End: 2026-02-11

## 2025-08-18 ENCOUNTER — RESULTS FOLLOW-UP (OUTPATIENT)
Dept: RHEUMATOLOGY | Facility: CLINIC | Age: 85
End: 2025-08-18
Payer: MEDICARE

## 2025-08-18 DIAGNOSIS — D84.821 DRUG-INDUCED IMMUNODEFICIENCY: ICD-10-CM

## 2025-08-18 DIAGNOSIS — Z79.899 DRUG-INDUCED IMMUNODEFICIENCY: ICD-10-CM

## 2025-08-18 DIAGNOSIS — Z79.899 HIGH RISK MEDICATION USE: ICD-10-CM

## 2025-08-18 DIAGNOSIS — M06.09 RHEUMATOID ARTHRITIS OF MULTIPLE SITES WITH NEGATIVE RHEUMATOID FACTOR: Primary | ICD-10-CM

## 2025-08-27 ENCOUNTER — HOSPITAL ENCOUNTER (OUTPATIENT)
Dept: RADIOLOGY | Facility: CLINIC | Age: 85
Discharge: HOME | End: 2025-08-27
Attending: SPECIALIST
Payer: MEDICARE

## 2025-08-27 ENCOUNTER — OFFICE VISIT (OUTPATIENT)
Dept: ORTHOPEDICS | Facility: CLINIC | Age: 85
End: 2025-08-27
Payer: MEDICARE

## 2025-08-27 VITALS
BODY MASS INDEX: 30.18 KG/M2 | WEIGHT: 164 LBS | HEART RATE: 73 BPM | SYSTOLIC BLOOD PRESSURE: 161 MMHG | HEIGHT: 62 IN | DIASTOLIC BLOOD PRESSURE: 81 MMHG

## 2025-08-27 DIAGNOSIS — Z96.641 STATUS POST RIGHT HIP REPLACEMENT: Primary | ICD-10-CM

## 2025-08-27 DIAGNOSIS — Z96.641 STATUS POST RIGHT HIP REPLACEMENT: ICD-10-CM

## 2025-08-27 PROCEDURE — 99213 OFFICE O/P EST LOW 20 MIN: CPT | Mod: ,,, | Performed by: SPECIALIST

## 2025-08-27 PROCEDURE — 1159F MED LIST DOCD IN RCRD: CPT | Mod: CPTII,,, | Performed by: SPECIALIST

## 2025-08-27 PROCEDURE — 3079F DIAST BP 80-89 MM HG: CPT | Mod: CPTII,,, | Performed by: SPECIALIST

## 2025-08-27 PROCEDURE — 1101F PT FALLS ASSESS-DOCD LE1/YR: CPT | Mod: CPTII,,, | Performed by: SPECIALIST

## 2025-08-27 PROCEDURE — 73502 X-RAY EXAM HIP UNI 2-3 VIEWS: CPT | Mod: RT,,, | Performed by: SPECIALIST

## 2025-08-27 PROCEDURE — 3077F SYST BP >= 140 MM HG: CPT | Mod: CPTII,,, | Performed by: SPECIALIST

## 2025-08-27 PROCEDURE — 3288F FALL RISK ASSESSMENT DOCD: CPT | Mod: CPTII,,, | Performed by: SPECIALIST

## (undated) DEVICE — DRAPE STERI U-SHAPED 47X51IN

## (undated) DEVICE — DRAPE MEDIUM SHEET 40X70IN

## (undated) DEVICE — KIT SURGICAL TURNOVER

## (undated) DEVICE — RETRIEVER SUTURE HEWSON DISP

## (undated) DEVICE — SOL POVIDONE IODINE PCH 3/4OZ

## (undated) DEVICE — SUT 1 36IN PDS II VIO MONO

## (undated) DEVICE — SOL NACL IRR 1000ML BTL

## (undated) DEVICE — TAPE POROUS 3 IN 10 YD WHITE

## (undated) DEVICE — APPLICATOR CHLORAPREP ORN 26ML

## (undated) DEVICE — SPONGE COTTON TRAY 4X4IN

## (undated) DEVICE — TAPE ADH MEDIPORE 4 X 10YDS

## (undated) DEVICE — KIT CHECKPOINT TIBIAL

## (undated) DEVICE — KIT TRACKING VIZADISC HIP

## (undated) DEVICE — SYR 30CC LUER LOCK

## (undated) DEVICE — SOL NACL IRR 3000ML

## (undated) DEVICE — BLADE SAW SAG 22.13MM 0.92MM

## (undated) DEVICE — PILLOW ABDUCTION FOAM MED

## (undated) DEVICE — HOOD FLYTE SURGICOOL

## (undated) DEVICE — KIT DRAPE RIO ONE PIECE W/POCK

## (undated) DEVICE — SYR 10CC LUER LOCK

## (undated) DEVICE — SUT VICRYL 2-0 36 CT-1

## (undated) DEVICE — DRAPE FULL SHEET 70X100IN

## (undated) DEVICE — SUT PDS PLUS 1 TP1 96IN

## (undated) DEVICE — GOWN POLY REINF X-LONG 2XL

## (undated) DEVICE — BLADE SURG STAINLESS STEEL #15

## (undated) DEVICE — TOWEL OR DISP STRL BLUE 4/PK

## (undated) DEVICE — SOL 1L ACD-A

## (undated) DEVICE — KIT TOTAL HIP HLGC

## (undated) DEVICE — KIT C.A.T.S. FAST START

## (undated) DEVICE — COVER HD BACK TABLE 6FT

## (undated) DEVICE — GLOVE SENSICARE PI MICRO 8

## (undated) DEVICE — ELECTRODE PATIENT RETURN DISP

## (undated) DEVICE — GLOVE SENSICARE PI GRN 8

## (undated) DEVICE — SUT MCRYL PLUS 4-0 PS2 27IN

## (undated) DEVICE — STRIP MEDI WND CLSR 1/2X4IN

## (undated) DEVICE — PAD ABDOMINAL STERILE 8X10IN

## (undated) DEVICE — SUT VICRYL 1 OB 36 CTX

## (undated) DEVICE — PIN BONE 4 X 140MM STERILE
Type: IMPLANTABLE DEVICE | Site: HIP | Status: NON-FUNCTIONAL
Removed: 2024-07-23

## (undated) DEVICE — SPONGE X-RAY LAP DETCT 18X18IN